# Patient Record
Sex: FEMALE | Race: WHITE | NOT HISPANIC OR LATINO | Employment: UNEMPLOYED | URBAN - NONMETROPOLITAN AREA
[De-identification: names, ages, dates, MRNs, and addresses within clinical notes are randomized per-mention and may not be internally consistent; named-entity substitution may affect disease eponyms.]

---

## 2017-02-27 ENCOUNTER — TRANSCRIBE ORDERS (OUTPATIENT)
Dept: MAMMOGRAPHY | Facility: HOSPITAL | Age: 57
End: 2017-02-27

## 2017-02-27 DIAGNOSIS — Z13.9 SCREENING: Primary | ICD-10-CM

## 2017-03-06 ENCOUNTER — HOSPITAL ENCOUNTER (OUTPATIENT)
Dept: MAMMOGRAPHY | Facility: HOSPITAL | Age: 57
Discharge: HOME | End: 2017-03-06
Attending: OBSTETRICS & GYNECOLOGY | Admitting: OBSTETRICS & GYNECOLOGY

## 2017-03-06 DIAGNOSIS — Z13.9 SCREENING: ICD-10-CM

## 2017-03-06 PROCEDURE — G0202 SCR MAMMO BI INCL CAD: HCPCS

## 2017-03-06 PROCEDURE — 77063 BREAST TOMOSYNTHESIS BI: CPT

## 2017-05-08 ENCOUNTER — TRANSCRIBE ORDERS (OUTPATIENT)
Dept: ULTRASOUND IMAGING | Facility: HOSPITAL | Age: 57
End: 2017-05-08

## 2017-05-08 ENCOUNTER — HOSPITAL ENCOUNTER (OUTPATIENT)
Dept: ULTRASOUND IMAGING | Facility: HOSPITAL | Age: 57
Discharge: HOME | End: 2017-05-08
Admitting: NURSE PRACTITIONER

## 2017-05-08 DIAGNOSIS — R60.9 SWELLING: ICD-10-CM

## 2017-05-08 DIAGNOSIS — R60.9 SWELLING: Primary | ICD-10-CM

## 2017-05-08 PROCEDURE — 76536 US EXAM OF HEAD AND NECK: CPT

## 2017-07-05 RX ORDER — LEVOTHYROXINE SODIUM 100 MCG
100 TABLET ORAL DAILY
Qty: 30 TABLET | Refills: 0 | Status: SHIPPED | OUTPATIENT
Start: 2017-07-05 | End: 2017-07-21 | Stop reason: SDUPTHER

## 2017-07-21 ENCOUNTER — TELEPHONE (OUTPATIENT)
Dept: ENDOCRINOLOGY | Facility: CLINIC | Age: 57
End: 2017-07-21

## 2017-07-21 ENCOUNTER — OFFICE VISIT (OUTPATIENT)
Dept: ENDOCRINOLOGY | Facility: CLINIC | Age: 57
End: 2017-07-21

## 2017-07-21 VITALS
HEART RATE: 62 BPM | WEIGHT: 206 LBS | HEIGHT: 65 IN | DIASTOLIC BLOOD PRESSURE: 74 MMHG | OXYGEN SATURATION: 98 % | SYSTOLIC BLOOD PRESSURE: 124 MMHG | BODY MASS INDEX: 34.32 KG/M2

## 2017-07-21 DIAGNOSIS — R53.82 CHRONIC FATIGUE: ICD-10-CM

## 2017-07-21 DIAGNOSIS — R21 RASH: ICD-10-CM

## 2017-07-21 DIAGNOSIS — E03.9 ACQUIRED HYPOTHYROIDISM: Primary | ICD-10-CM

## 2017-07-21 DIAGNOSIS — E78.00 PURE HYPERCHOLESTEROLEMIA: ICD-10-CM

## 2017-07-21 LAB
25(OH)D3 SERPL-MCNC: 53.4 NG/ML
ALBUMIN SERPL-MCNC: 4.5 G/DL (ref 3.2–4.8)
ALBUMIN/GLOB SERPL: 1.4 G/DL (ref 1.5–2.5)
ALP SERPL-CCNC: 103 U/L (ref 25–100)
ALT SERPL W P-5'-P-CCNC: 33 U/L (ref 7–40)
ANION GAP SERPL CALCULATED.3IONS-SCNC: 7 MMOL/L (ref 3–11)
ARTICHOKE IGE QN: 88 MG/DL (ref 0–130)
AST SERPL-CCNC: 23 U/L (ref 0–33)
BASOPHILS # BLD AUTO: 0.03 10*3/MM3 (ref 0–0.2)
BASOPHILS NFR BLD AUTO: 0.5 % (ref 0–1)
BILIRUB SERPL-MCNC: 0.5 MG/DL (ref 0.3–1.2)
BUN BLD-MCNC: 23 MG/DL (ref 9–23)
BUN/CREAT SERPL: 28.8 (ref 7–25)
CALCIUM SPEC-SCNC: 9.8 MG/DL (ref 8.7–10.4)
CHLORIDE SERPL-SCNC: 104 MMOL/L (ref 99–109)
CHOLEST SERPL-MCNC: 138 MG/DL (ref 0–200)
CO2 SERPL-SCNC: 28 MMOL/L (ref 20–31)
CREAT BLD-MCNC: 0.8 MG/DL (ref 0.6–1.3)
DEPRECATED RDW RBC AUTO: 42.2 FL (ref 37–54)
EOSINOPHIL # BLD AUTO: 0.25 10*3/MM3 (ref 0–0.3)
EOSINOPHIL NFR BLD AUTO: 4.5 % (ref 0–3)
ERYTHROCYTE [DISTWIDTH] IN BLOOD BY AUTOMATED COUNT: 12.7 % (ref 11.3–14.5)
GFR SERPL CREATININE-BSD FRML MDRD: 74 ML/MIN/1.73
GLOBULIN UR ELPH-MCNC: 3.3 GM/DL
GLUCOSE BLD-MCNC: 104 MG/DL (ref 70–100)
HCT VFR BLD AUTO: 43.5 % (ref 34.5–44)
HDLC SERPL-MCNC: 42 MG/DL (ref 40–60)
HGB BLD-MCNC: 14.2 G/DL (ref 11.5–15.5)
IMM GRANULOCYTES # BLD: 0.01 10*3/MM3 (ref 0–0.03)
IMM GRANULOCYTES NFR BLD: 0.2 % (ref 0–0.6)
LYMPHOCYTES # BLD AUTO: 1.77 10*3/MM3 (ref 0.6–4.8)
LYMPHOCYTES NFR BLD AUTO: 31.7 % (ref 24–44)
MCH RBC QN AUTO: 29.8 PG (ref 27–31)
MCHC RBC AUTO-ENTMCNC: 32.6 G/DL (ref 32–36)
MCV RBC AUTO: 91.2 FL (ref 80–99)
MONOCYTES # BLD AUTO: 0.39 10*3/MM3 (ref 0–1)
MONOCYTES NFR BLD AUTO: 7 % (ref 0–12)
NEUTROPHILS # BLD AUTO: 3.13 10*3/MM3 (ref 1.5–8.3)
NEUTROPHILS NFR BLD AUTO: 56.1 % (ref 41–71)
PLATELET # BLD AUTO: 210 10*3/MM3 (ref 150–450)
PMV BLD AUTO: 11.6 FL (ref 6–12)
POTASSIUM BLD-SCNC: 4.4 MMOL/L (ref 3.5–5.5)
PROT SERPL-MCNC: 7.8 G/DL (ref 5.7–8.2)
RBC # BLD AUTO: 4.77 10*6/MM3 (ref 3.89–5.14)
SODIUM BLD-SCNC: 139 MMOL/L (ref 132–146)
T4 FREE SERPL-MCNC: 1.21 NG/DL (ref 0.89–1.76)
TRIGL SERPL-MCNC: 68 MG/DL (ref 0–150)
TSH SERPL DL<=0.05 MIU/L-ACNC: 2.13 MIU/ML (ref 0.35–5.35)
WBC NRBC COR # BLD: 5.58 10*3/MM3 (ref 3.5–10.8)

## 2017-07-21 PROCEDURE — 82306 VITAMIN D 25 HYDROXY: CPT | Performed by: INTERNAL MEDICINE

## 2017-07-21 PROCEDURE — 83525 ASSAY OF INSULIN: CPT | Performed by: INTERNAL MEDICINE

## 2017-07-21 PROCEDURE — 80061 LIPID PANEL: CPT | Performed by: INTERNAL MEDICINE

## 2017-07-21 PROCEDURE — 84443 ASSAY THYROID STIM HORMONE: CPT | Performed by: INTERNAL MEDICINE

## 2017-07-21 PROCEDURE — 85025 COMPLETE CBC W/AUTO DIFF WBC: CPT | Performed by: INTERNAL MEDICINE

## 2017-07-21 PROCEDURE — 80053 COMPREHEN METABOLIC PANEL: CPT | Performed by: INTERNAL MEDICINE

## 2017-07-21 PROCEDURE — 84439 ASSAY OF FREE THYROXINE: CPT | Performed by: INTERNAL MEDICINE

## 2017-07-21 PROCEDURE — 99214 OFFICE O/P EST MOD 30 MIN: CPT | Performed by: INTERNAL MEDICINE

## 2017-07-21 PROCEDURE — 84481 FREE ASSAY (FT-3): CPT | Performed by: INTERNAL MEDICINE

## 2017-07-21 RX ORDER — CHLORAL HYDRATE 500 MG
CAPSULE ORAL 2 TIMES DAILY WITH MEALS
COMMUNITY
End: 2018-08-31

## 2017-07-21 RX ORDER — LEVOTHYROXINE SODIUM 100 MCG
100 TABLET ORAL DAILY
Qty: 30 TABLET | Refills: 11 | Status: SHIPPED | OUTPATIENT
Start: 2017-07-21 | End: 2017-10-26

## 2017-07-21 RX ORDER — PHENTERMINE HYDROCHLORIDE 37.5 MG/1
TABLET ORAL
Refills: 0 | COMMUNITY
Start: 2017-06-20 | End: 2018-08-31

## 2017-07-21 NOTE — PROGRESS NOTES
Ruthann Morales 56 y.o.  CC:Follow-up (Yearly) and Hypothyroidism    [place]: Follow-up (Yearly) and Hypothyroidism    Doing well overall   Good summer so far   Going to [place] for vacation  Energy is good     Allergies   Allergen Reactions   • Sulfa Antibiotics Rash       Current Outpatient Prescriptions:   •  Multiple Vitamins-Minerals (MULTIVITAMIN ADULT PO), Take  by mouth., Disp: , Rfl:   •  Omega-3 Fatty Acids (FISH OIL) 1000 MG capsule capsule, Take  by mouth 2 (Two) Times a Day With Meals., Disp: , Rfl:   •  Cholecalciferol (VITAMIN D) 1000 UNITS tablet, Take 2 tablets by mouth Daily., Disp: , Rfl:   •  cyclobenzaprine (FLEXERIL) 10 MG tablet, Take 1 tablet by mouth 3 (Three) Times a Day As Needed for Muscle Spasms., Disp: 15 tablet, Rfl: 0  •  phentermine (ADIPEX-P) 37.5 MG tablet, , Disp: , Rfl: 0  •  SYNTHROID 100 MCG tablet, TAKE 1 TABLET BY MOUTH ONCE DAILY, Disp: 30 tablet, Rfl: 11  Patient Active Problem List    Diagnosis   • Chronic fatigue [R53.82]   • Abdominal pain [R10.9]   • Hypothyroidism [E03.9]     Overview Note:     Impression: 05/18/2015 - check tfts  Impression: 05/15/2014 - check tft;      • Irreducible incisional hernia [K43.0]     Review of Systems   Constitutional: Negative for activity change, appetite change, chills, diaphoresis, fatigue, fever and unexpected weight change.   HENT: Negative for congestion, dental problem, drooling, ear discharge, ear pain, facial swelling, hearing loss, mouth sores, nosebleeds, postnasal drip, rhinorrhea, sinus pressure, sneezing, sore throat, tinnitus, trouble swallowing and voice change.    Eyes: Negative for photophobia, pain, discharge, redness, itching and visual disturbance.   Respiratory: Negative for apnea, cough, choking, chest tightness, shortness of breath, wheezing and stridor.    Cardiovascular: Negative for chest pain, palpitations and leg swelling.   Gastrointestinal: Negative for abdominal distention, abdominal pain, anal bleeding, blood  "in stool, constipation, diarrhea, nausea, rectal pain and vomiting.   Endocrine: Negative for cold intolerance, heat intolerance, polydipsia, polyphagia and polyuria.   Genitourinary: Negative for decreased urine volume, difficulty urinating, dysuria, enuresis, flank pain, frequency, genital sores, hematuria and urgency.   Musculoskeletal: Negative for arthralgias, back pain, gait problem, joint swelling, myalgias, neck pain and neck stiffness.   Skin: Negative for color change, pallor, rash and wound.   Allergic/Immunologic: Negative for environmental allergies, food allergies and immunocompromised state.   Neurological: Negative for dizziness, tremors, seizures, syncope, facial asymmetry, speech difficulty, weakness, light-headedness, numbness and headaches.   Hematological: Negative for adenopathy. Does not bruise/bleed easily.   Psychiatric/Behavioral: Negative for agitation, behavioral problems, confusion, decreased concentration, dysphoric mood, hallucinations, self-injury, sleep disturbance and suicidal ideas. The patient is not nervous/anxious and is not hyperactive.      Social History     Social History   • Marital status:      Spouse name: N/A   • Number of children: N/A   • Years of education: N/A     Occupational History   • Not on file.     Social History Main Topics   • Smoking status: Never Smoker   • Smokeless tobacco: Never Used   • Alcohol use Yes      Comment: occasionally   • Drug use: No   • Sexual activity: Not on file      Comment:      Other Topics Concern   • Not on file     Social History Narrative     Family History   Problem Relation Age of Onset   • Cancer Mother    • Breast cancer Mother    • Cancer Father    • Stroke Other    • Diabetes Other    • Heart attack Other    • Breast cancer Maternal Aunt      /74  Pulse 62  Ht 65\" (165.1 cm)  Wt 206 lb (93.4 kg)  SpO2 98%  BMI 34.28 kg/m2  Physical Exam   Constitutional: She is oriented to person, place, and time. " She appears well-developed and well-nourished.   HENT:   Head: Normocephalic and atraumatic.   Nose: Nose normal.   Mouth/Throat: Oropharynx is clear and moist.   Eyes: Conjunctivae, EOM and lids are normal. Pupils are equal, round, and reactive to light.   Neck: Trachea normal and normal range of motion. Neck supple. Carotid bruit is not present. No tracheal deviation present. No thyroid mass and no thyromegaly present.   Cardiovascular: Normal rate, regular rhythm, normal heart sounds and intact distal pulses.  Exam reveals no gallop and no friction rub.    No murmur heard.  Pulmonary/Chest: Effort normal and breath sounds normal. No respiratory distress. She has no wheezes.   Musculoskeletal: Normal range of motion. She exhibits no edema or deformity.   Lymphadenopathy:     She has no cervical adenopathy.   Neurological: She is alert and oriented to person, place, and time. She has normal reflexes. She displays normal reflexes. No cranial nerve deficit.   Skin: Skin is warm and dry. No rash noted. No cyanosis or erythema. Nails show no clubbing.   Rash right arm    Psychiatric: She has a normal mood and affect. Her speech is normal and behavior is normal. Judgment and thought content normal. Cognition and memory are normal.   Nursing note and vitals reviewed.    Results for orders placed or performed in visit on 07/21/16   T4, free   Result Value Ref Range    Free T4 1.00 0.89 - 1.76 ng/dL   TSH   Result Value Ref Range    TSH 2.263 0.350 - 5.350 mIU/mL   Vitamin D 25 hydroxy   Result Value Ref Range    25 Hydroxy, Vitamin D 42.5 ng/ml   Vitamin B12   Result Value Ref Range    Vitamin B-12 322 211 - 911 pg/mL   CBC auto differential   Result Value Ref Range    WBC 6.10 3.50 - 10.80 10*3/mm3    RBC 4.55 3.89 - 5.14 10*6/mm3    Hemoglobin 14.3 11.5 - 15.5 g/dL    Hematocrit 42.4 34.5 - 44.0 %    MCV 93.2 80.0 - 99.0 fL    MCH 31.4 (H) 27.0 - 31.0 pg    MCHC 33.7 32.0 - 36.0 g/dL    RDW 12.9 11.3 - 14.5 %    RDW-SD  43.8 37.0 - 54.0 fl    MPV 12.1 (H) 6.0 - 12.0 fL    Platelets 219 150 - 450 10*3/mm3    Neutrophil % 59.7 41.0 - 71.0 %    Lymphocyte % 26.9 24.0 - 44.0 %    Monocyte % 6.7 0.0 - 12.0 %    Eosinophil % 5.7 (H) 0.0 - 3.0 %    Basophil % 0.8 0.0 - 1.0 %    Immature Grans % 0.2 0.0 - 0.6 %    Neutrophils, Absolute 3.64 1.50 - 8.30 10*3/mm3    Lymphocytes, Absolute 1.64 0.60 - 4.80 10*3/mm3    Monocytes, Absolute 0.41 0.00 - 1.00 10*3/mm3    Eosinophils, Absolute 0.35 (H) 0.10 - 0.30 10*3/mm3    Basophils, Absolute 0.05 0.00 - 0.20 10*3/mm3    Immature Grans, Absolute 0.01 0.00 - 0.03 10*3/mm3     Problem List Items Addressed This Visit        Cardiovascular and Mediastinum    Pure hypercholesterolemia     Check flp          Relevant Orders    CBC & Differential    Lipid Panel    Vitamin D 25 Hydroxy    Insulin, Total       Endocrine    Hypothyroidism - Primary     Check tfts          Relevant Medications    SYNTHROID 100 MCG tablet    Other Relevant Orders    Comprehensive Metabolic Panel    T3, Free    T4, Free    TSH       Musculoskeletal and Integument    Rash     Rash right arm   Refill tmc          Relevant Medications    triamcinolone (KENALOG) 0.1 % ointment       Other    Chronic fatigue     Check vitamin D and insulin levels          Relevant Medications    phentermine (ADIPEX-P) 37.5 MG tablet        Return in about 1 year (around 7/21/2018) for Recheck 30 min .        Sarah Conde MA  Signed Kia Morelos MD

## 2017-07-22 LAB
INSULIN SERPL-ACNC: 11 UIU/ML (ref 2.6–24.9)
T3FREE SERPL-MCNC: 3 PG/ML (ref 2–4.4)

## 2017-07-26 ENCOUNTER — TELEPHONE (OUTPATIENT)
Dept: ENDOCRINOLOGY | Facility: CLINIC | Age: 57
End: 2017-07-26

## 2017-07-26 RX ORDER — TRIAMCINOLONE ACETONIDE 1 MG/ML
LOTION TOPICAL 2 TIMES DAILY
Qty: 60 ML | Refills: 0 | Status: SHIPPED | OUTPATIENT
Start: 2017-07-26 | End: 2018-08-31

## 2017-07-26 NOTE — TELEPHONE ENCOUNTER
Pt called stating Triamcinolone was called in for her but it was the oil. She would rather have the cream because the oil gets all over her clothes.

## 2017-10-24 ENCOUNTER — TELEPHONE (OUTPATIENT)
Dept: INTERNAL MEDICINE | Facility: CLINIC | Age: 57
End: 2017-10-24

## 2017-10-24 DIAGNOSIS — R25.2 LEG CRAMPS: Primary | ICD-10-CM

## 2017-10-24 DIAGNOSIS — E03.9 ACQUIRED HYPOTHYROIDISM: ICD-10-CM

## 2017-10-24 NOTE — TELEPHONE ENCOUNTER
Patient was advised lab order has been created and she states she will go to [facility] in [place]

## 2017-10-24 NOTE — TELEPHONE ENCOUNTER
Order created.  Ok to fax to [place].  Thanks, [facility]      PT HAS BEEN HAVING LEG CRAMPS. THE EVENING IS THE WORST TIME AND WAKES UP WITH KRISTIN ROQUE.  WOULD LIKE TO KNOW IF YOU CAN PUT AN ORDER IN FOR LAB WORK. IF SO CAN SHE GET IT DONE IN [place]? IF NOT LET ME KNOW AND ILL MAKE HER AN APPOINTMENT.

## 2017-10-25 ENCOUNTER — APPOINTMENT (OUTPATIENT)
Dept: LAB | Facility: HOSPITAL | Age: 57
End: 2017-10-25

## 2017-10-25 LAB
ALBUMIN SERPL-MCNC: 4.4 G/DL (ref 3.5–5)
ALBUMIN/GLOB SERPL: 1.6 G/DL (ref 1–2)
ALP SERPL-CCNC: 85 U/L (ref 38–126)
ALT SERPL W P-5'-P-CCNC: 43 U/L (ref 13–69)
ANION GAP SERPL CALCULATED.3IONS-SCNC: 18.6 MMOL/L
AST SERPL-CCNC: 25 U/L (ref 15–46)
BILIRUB SERPL-MCNC: 0.4 MG/DL (ref 0.2–1.3)
BUN BLD-MCNC: 17 MG/DL (ref 7–20)
BUN/CREAT SERPL: 24.3 (ref 7.1–23.5)
CALCIUM SPEC-SCNC: 9.6 MG/DL (ref 8.4–10.2)
CHLORIDE SERPL-SCNC: 106 MMOL/L (ref 98–107)
CO2 SERPL-SCNC: 24 MMOL/L (ref 26–30)
CREAT BLD-MCNC: 0.7 MG/DL (ref 0.6–1.3)
GFR SERPL CREATININE-BSD FRML MDRD: 86 ML/MIN/1.73
GLOBULIN UR ELPH-MCNC: 2.7 GM/DL
GLUCOSE BLD-MCNC: 109 MG/DL (ref 74–98)
POTASSIUM BLD-SCNC: 4.6 MMOL/L (ref 3.5–5.1)
PROT SERPL-MCNC: 7.1 G/DL (ref 6.3–8.2)
SODIUM BLD-SCNC: 144 MMOL/L (ref 137–145)
T4 FREE SERPL-MCNC: 1.09 NG/DL (ref 0.78–2.19)
TSH SERPL DL<=0.05 MIU/L-ACNC: 5.38 MIU/ML (ref 0.47–4.68)

## 2017-10-25 PROCEDURE — 80053 COMPREHEN METABOLIC PANEL: CPT | Performed by: INTERNAL MEDICINE

## 2017-10-25 PROCEDURE — 84443 ASSAY THYROID STIM HORMONE: CPT | Performed by: INTERNAL MEDICINE

## 2017-10-25 PROCEDURE — 84439 ASSAY OF FREE THYROXINE: CPT | Performed by: INTERNAL MEDICINE

## 2017-10-26 ENCOUNTER — TELEPHONE (OUTPATIENT)
Dept: INTERNAL MEDICINE | Facility: CLINIC | Age: 57
End: 2017-10-26

## 2017-10-26 RX ORDER — LEVOTHYROXINE SODIUM 100 MCG
100 TABLET ORAL DAILY
Qty: 30 TABLET | Refills: 11 | Status: SHIPPED | OUTPATIENT
Start: 2017-10-26 | End: 2017-10-28

## 2017-10-26 NOTE — TELEPHONE ENCOUNTER
tsh was elevated - I would continue 0.1 mg daily but take extra pill today and once weekly (take 1 pill every day except take 2 pills every Thursday).  Repeat tfts in 8 weeks.   Also please let her know that results will be sent to her within a week of lab being done- she does not need to call the office.  Thanks, [facility]    Pls advise on lab results.

## 2017-10-27 NOTE — TELEPHONE ENCOUNTER
Called pt no answer left vm to return call.     10/30/17  Called and informed pt, pt voiced understanding.

## 2017-10-28 ENCOUNTER — TELEPHONE (OUTPATIENT)
Dept: INTERNAL MEDICINE | Facility: CLINIC | Age: 57
End: 2017-10-28

## 2017-10-28 RX ORDER — LEVOTHYROXINE SODIUM 125 MCG
125 TABLET ORAL DAILY
Qty: 90 TABLET | Refills: 1 | Status: SHIPPED | OUTPATIENT
Start: 2017-10-28 | End: 2017-12-24

## 2017-10-29 ENCOUNTER — TELEPHONE (OUTPATIENT)
Dept: INTERNAL MEDICINE | Facility: CLINIC | Age: 57
End: 2017-10-29

## 2017-12-19 ENCOUNTER — TELEPHONE (OUTPATIENT)
Dept: INTERNAL MEDICINE | Facility: CLINIC | Age: 57
End: 2017-12-19

## 2017-12-19 DIAGNOSIS — E03.9 ACQUIRED HYPOTHYROIDISM: Primary | ICD-10-CM

## 2017-12-19 NOTE — TELEPHONE ENCOUNTER
NEEDS TO GET HER LABS THIS WEEK PER LETTER DR VELASQUEZ SENT HER IN October. THERE ISN'T ANY IN THE SYSTEM. SHE WANTS TO GO TO [place]. CAN YOU CALL HER WHEN THEY ARE IN.

## 2017-12-20 ENCOUNTER — APPOINTMENT (OUTPATIENT)
Dept: LAB | Facility: HOSPITAL | Age: 57
End: 2017-12-20

## 2017-12-20 LAB
T4 FREE SERPL-MCNC: 1.36 NG/DL (ref 0.78–2.19)
TSH SERPL DL<=0.05 MIU/L-ACNC: 0.07 MIU/ML (ref 0.47–4.68)

## 2017-12-20 PROCEDURE — 84439 ASSAY OF FREE THYROXINE: CPT | Performed by: INTERNAL MEDICINE

## 2017-12-20 PROCEDURE — 84443 ASSAY THYROID STIM HORMONE: CPT | Performed by: INTERNAL MEDICINE

## 2017-12-24 ENCOUNTER — TELEPHONE (OUTPATIENT)
Dept: INTERNAL MEDICINE | Facility: CLINIC | Age: 57
End: 2017-12-24

## 2017-12-24 RX ORDER — LEVOTHYROXINE SODIUM 112 UG/1
112 TABLET ORAL DAILY
Qty: 30 TABLET | Refills: 11 | Status: SHIPPED | OUTPATIENT
Start: 2017-12-24 | End: 2018-02-27 | Stop reason: SDUPTHER

## 2018-02-22 ENCOUNTER — TELEPHONE (OUTPATIENT)
Dept: INTERNAL MEDICINE | Facility: CLINIC | Age: 58
End: 2018-02-22

## 2018-02-22 DIAGNOSIS — E03.9 ACQUIRED HYPOTHYROIDISM: Primary | ICD-10-CM

## 2018-02-22 NOTE — TELEPHONE ENCOUNTER
PATIENT NEEDS LAB ORDERS PLACED IN EPIC, SHE WILL HAVE BLOOD DRAW DONE AT [facility]. SHE STATES DR MCDONNELL WANTED HER TO REPEAT THYROID LABS IN 3 MONTHS AFTER LAST LABS.    CALL BACK [phone]

## 2018-02-26 ENCOUNTER — APPOINTMENT (OUTPATIENT)
Dept: LAB | Facility: HOSPITAL | Age: 58
End: 2018-02-26

## 2018-02-26 LAB
T4 FREE SERPL-MCNC: 1.04 NG/DL (ref 0.78–2.19)
TSH SERPL DL<=0.05 MIU/L-ACNC: 0.18 MIU/ML (ref 0.47–4.68)

## 2018-02-26 PROCEDURE — 84439 ASSAY OF FREE THYROXINE: CPT | Performed by: INTERNAL MEDICINE

## 2018-02-26 PROCEDURE — 84443 ASSAY THYROID STIM HORMONE: CPT | Performed by: INTERNAL MEDICINE

## 2018-02-27 ENCOUNTER — TELEPHONE (OUTPATIENT)
Dept: ENDOCRINOLOGY | Facility: CLINIC | Age: 58
End: 2018-02-27

## 2018-02-27 RX ORDER — LEVOTHYROXINE SODIUM 0.1 MG/1
100 TABLET ORAL DAILY
Qty: 30 TABLET | Refills: 11 | Status: SHIPPED | OUTPATIENT
Start: 2018-02-27 | End: 2019-01-30 | Stop reason: SDUPTHER

## 2018-03-07 ENCOUNTER — HOSPITAL ENCOUNTER (OUTPATIENT)
Dept: MAMMOGRAPHY | Facility: HOSPITAL | Age: 58
Discharge: HOME | End: 2018-03-07
Admitting: OBSTETRICS & GYNECOLOGY

## 2018-03-07 DIAGNOSIS — Z12.31 SCREENING MAMMOGRAM, ENCOUNTER FOR: ICD-10-CM

## 2018-03-07 PROCEDURE — 77063 BREAST TOMOSYNTHESIS BI: CPT

## 2018-03-07 PROCEDURE — 77067 SCR MAMMO BI INCL CAD: CPT

## 2018-04-21 ENCOUNTER — OFFICE VISIT (OUTPATIENT)
Dept: INTERNAL MEDICINE | Facility: CLINIC | Age: 58
End: 2018-04-21

## 2018-04-21 VITALS
WEIGHT: 220 LBS | SYSTOLIC BLOOD PRESSURE: 122 MMHG | HEIGHT: 65 IN | DIASTOLIC BLOOD PRESSURE: 84 MMHG | HEART RATE: 75 BPM | BODY MASS INDEX: 36.65 KG/M2 | OXYGEN SATURATION: 98 %

## 2018-04-21 DIAGNOSIS — M62.838 TRAPEZIUS MUSCLE SPASM: ICD-10-CM

## 2018-04-21 DIAGNOSIS — R25.2 LEG CRAMPS: Primary | ICD-10-CM

## 2018-04-21 PROCEDURE — 99213 OFFICE O/P EST LOW 20 MIN: CPT | Performed by: PHYSICIAN ASSISTANT

## 2018-04-21 RX ORDER — CYCLOBENZAPRINE HCL 10 MG
10 TABLET ORAL 3 TIMES DAILY PRN
Qty: 15 TABLET | Refills: 0 | Status: CANCELLED | OUTPATIENT
Start: 2018-04-21

## 2018-04-21 RX ORDER — TIZANIDINE HYDROCHLORIDE 4 MG/1
4 CAPSULE, GELATIN COATED ORAL 2 TIMES DAILY
Qty: 60 CAPSULE | Refills: 11 | Status: SHIPPED | OUTPATIENT
Start: 2018-04-21 | End: 2018-09-11

## 2018-04-21 RX ORDER — TIZANIDINE HYDROCHLORIDE 4 MG/1
4 CAPSULE, GELATIN COATED ORAL 2 TIMES DAILY
Qty: 60 CAPSULE | Refills: 11 | Status: SHIPPED | OUTPATIENT
Start: 2018-04-21 | End: 2018-04-21 | Stop reason: SDUPTHER

## 2018-04-21 NOTE — PROGRESS NOTES
Subjective   Ruthann Morales is a 57 y.o. female  Leg Pain (Leg cramps having hard time sleeping at night. Sx started 3-4 months ago, gradually getting worse. )      History of Present Illness  Patient is a 57-year-old white female who comes in complaining of leg cramps at night she states that cramps are getting worse.  She notices grams couple of months ago she states that neither feels like her legs are moving while she's lying still.  Denies injury or trauma patient states when she gets up and walks around at night cramps decrease      The following portions of the patient's history were reviewed and updated as appropriate: allergies, current medications, past social history and problem list    Review of Systems   Constitutional: Negative for fatigue and unexpected weight change.   Respiratory: Negative for cough, chest tightness and shortness of breath.    Cardiovascular: Negative for chest pain, palpitations and leg swelling.   Gastrointestinal: Negative for nausea.   Skin: Negative for color change and rash.   Neurological: Negative for dizziness, syncope, weakness and headaches.       Objective     Vitals:    04/21/18 0947   BP: 122/84   Pulse: 75   SpO2: 98%       Physical Exam   Constitutional: She appears well-developed and well-nourished.   Neck: No JVD present.   Cardiovascular: Normal rate, regular rhythm, normal heart sounds, intact distal pulses and normal pulses.    No murmur heard.  Pulmonary/Chest: Effort normal and breath sounds normal. No respiratory distress.   Abdominal: Soft. Bowel sounds are normal. There is no hepatosplenomegaly. There is no tenderness.   Musculoskeletal: She exhibits no edema.        Right lower leg: She exhibits no tenderness and no swelling.        Left lower leg: She exhibits no tenderness and no swelling.   Skin: Skin is warm and dry.   Nursing note and vitals reviewed.      Assessment/Plan     Diagnoses and all orders for this visit:    Leg cramps  -      TiZANidine (ZANAFLEX) 4 MG capsule; Take 1 capsule by mouth 2 (Two) Times a Day.    Other orders  -     Cancel: cyclobenzaprine (FLEXERIL) 10 MG tablet; Take 1 tablet by mouth 3 (Three) Times a Day As Needed for Muscle Spasms.    follow up if no better

## 2018-05-03 ENCOUNTER — TELEPHONE (OUTPATIENT)
Dept: INTERNAL MEDICINE | Facility: CLINIC | Age: 58
End: 2018-05-03

## 2018-05-03 DIAGNOSIS — E03.9 ACQUIRED HYPOTHYROIDISM: Primary | ICD-10-CM

## 2018-05-10 ENCOUNTER — APPOINTMENT (OUTPATIENT)
Dept: LAB | Facility: HOSPITAL | Age: 58
End: 2018-05-10

## 2018-05-10 LAB
T4 FREE SERPL-MCNC: 1.22 NG/DL (ref 0.78–2.19)
TSH SERPL DL<=0.05 MIU/L-ACNC: 0.75 MIU/ML (ref 0.47–4.68)

## 2018-05-10 PROCEDURE — 84439 ASSAY OF FREE THYROXINE: CPT | Performed by: INTERNAL MEDICINE

## 2018-05-10 PROCEDURE — 84443 ASSAY THYROID STIM HORMONE: CPT | Performed by: INTERNAL MEDICINE

## 2018-08-27 ENCOUNTER — OFFICE VISIT (OUTPATIENT)
Dept: FAMILY MEDICINE CLINIC | Facility: CLINIC | Age: 58
End: 2018-08-27

## 2018-08-27 ENCOUNTER — HOSPITAL ENCOUNTER (OUTPATIENT)
Dept: ULTRASOUND IMAGING | Facility: HOSPITAL | Age: 58
Discharge: HOME | End: 2018-08-27
Attending: FAMILY MEDICINE | Admitting: FAMILY MEDICINE

## 2018-08-27 VITALS
HEART RATE: 90 BPM | TEMPERATURE: 99 F | HEIGHT: 65 IN | SYSTOLIC BLOOD PRESSURE: 134 MMHG | WEIGHT: 227.6 LBS | DIASTOLIC BLOOD PRESSURE: 80 MMHG | BODY MASS INDEX: 37.92 KG/M2 | OXYGEN SATURATION: 97 %

## 2018-08-27 DIAGNOSIS — R60.0 LEG EDEMA, RIGHT: ICD-10-CM

## 2018-08-27 DIAGNOSIS — M79.661 PAIN IN RIGHT LOWER LEG: Primary | ICD-10-CM

## 2018-08-27 DIAGNOSIS — S86.111A GASTROCNEMIUS TEAR, RIGHT, INITIAL ENCOUNTER: ICD-10-CM

## 2018-08-27 PROCEDURE — 93971 EXTREMITY STUDY: CPT

## 2018-08-27 PROCEDURE — 99203 OFFICE O/P NEW LOW 30 MIN: CPT | Performed by: FAMILY MEDICINE

## 2018-08-27 NOTE — PROGRESS NOTES
Established Patient        Chief Complaint:   Chief Complaint   Patient presents with   • Leg Pain     Right leg pain today, patient states she is also here to establish care.        Ruthann Morales is a 57 y.o. female    History of Present Illness:     Patient is a 57-year-old female who presents for evaluation of right posterior calf pain.  She states it is been progressively problematic, paroxysmal and paradoxical in nature, without association to any specific activity.  She does state that when it is problematic it last for an extended period of time, relieved somewhat with moist heat, particularly soaking in hot water.  He does limit her function at times, initially reported as being severe in nature.  Only paradoxically severe at this time.  She has no focal trauma or remembrance of injury.  She states she feels as though it is swollen at times additionally.  She denies any palpitations, chest pain or shortness of breath.  No complaints of vertigo or dyspnea on exertion.    Will also like to establish care with a new primary care provider.    Subjective     The following portions of the patient's history were reviewed and updated as appropriate: allergies, current medications, past family history, past medical history, past social history, past surgical history and problem list.    Allergies   Allergen Reactions   • Sulfa Antibiotics Rash       Review of Systems  1. Constitutional: Negative for fever. Negative for chills, diaphoresis, fatigue and unexpected weight change.   2. HENT: No dysphagia; no changes to vision/hearing/smell/taste; no epistaxis  3. Eyes: Negative for redness and visual disturbance.   4. Respiratory: negative for shortness of breath. Negative for chest pain . Negative for cough and chest tightness.   5. Cardiovascular: Negative for chest pain and palpitations.   6. Gastrointestinal: Negative for abdominal distention, abdominal pain and blood in stool.   7. Endocrine: Negative for  "cold intolerance and heat intolerance.   8. Genitourinary: Negative for difficulty urinating, dysuria and frequency.   9. Musculoskeletal: Pain to the posterior right calf, without knowledge of trauma.   10. Skin: Negative for color change, rash and wound.   11. Neurological: Negative for syncope, weakness and headaches.   12. Hematological: Negative for adenopathy. Does not bruise/bleed easily.   13. Psychiatric/Behavioral: Negative for confusion. The patient is not nervous/anxious.    Objective     Physical Exam   Vital Signs: /80   Pulse 90   Temp 99 °F (37.2 °C) (Temporal Artery )   Ht 165.1 cm (65\")   Wt 103 kg (227 lb 9.6 oz)   SpO2 97%   BMI 37.87 kg/m²     General Appearance: alert, oriented x 3, no acute distress.  Skin: warm and dry.   HEENT: Atraumatic.  pupils round and reactive to light and accommodation, oral mucosa pink and moist.  Nares patent without epistaxis.  External auditory canals are patent tympanic membranes intact.  Neck: supple, no JVD, trachea midline.  No thyromegaly  Lungs: CTA, unlabored breathing effort.  Heart: RRR, normal S1 and S2, no S3, no rub.  Abdomen: soft, non-tender, no palpable bladder, present bowel sounds to auscultation ×4.  No guarding or rigidity.  Extremities: no clubbing, cyanosis.  Good range of motion actively and passively.  Symmetric muscle strength and development .  Mild nonpitting edema to right lower extremity.  [place] test negative.  Homans nonspecific.  No ligamentous laxity.  Telma's negative.  Dorsiflexion and plantar flexion bilaterally normal to the feet.  Dorsalis pedis and posterior tibialis pulses 2+.  Age appropriate and expected varicosities to bilateral lower extremities.  No superficial skin changes.  Patient is able to toe raise and heel rock appropriately.  Neuro: normal speech and mental status.  Cranial nerves II through XII intact.  No anosmia. DTR 2+; proprioception intact.  No focal motor/sensory deficits.    Assessment " and Plan      Assessment:   Ruthann was seen today for leg pain.    Diagnoses and all orders for this visit:    Pain in right lower leg  -     Duplex Venous Lower Extremity - Right CAR  -     Walking Boot- Right  -     diclofenac (VOLTAREN) 50 MG EC tablet; Take 1 tablet by mouth 2 (Two) Times a Day With Meals.  -     Ambulatory Referral to Physical Therapy Evaluate and treat; Cross Fiber, Soft Tissue Mobilizaton; Stretching, ROM, Strengthening; Right; Full weight bearing    Leg edema, right  -     Duplex Venous Lower Extremity - Right CAR  -     Ambulatory Referral to Physical Therapy Evaluate and treat; Cross Fiber, Soft Tissue Mobilizaton; Stretching, ROM, Strengthening; Right; Full weight bearing    Gastrocnemius tear, right, initial encounter  -     Walking Boot- Right  -     diclofenac (VOLTAREN) 50 MG EC tablet; Take 1 tablet by mouth 2 (Two) Times a Day With Meals.  -     Ambulatory Referral to Physical Therapy Evaluate and treat; Cross Fiber, Soft Tissue Mobilizaton; Stretching, ROM, Strengthening; Right; Full weight bearing        Plan:  I've discussed the probable diagnosis of gastrocnemius tear in the remote past.  I recommended that she undergo venous ultrasound to rule out DVT due to nontraumatic nature.  If negative, patient will be placed in a high tide pneumatic walking boot.  Planned duration of therapy initially will be 2 weeks with any weightbearing activity.  I've also prescribed diclofenac, 50 mg twice daily dosing with food, for an additional 2 week duration.  I have recommended that patient consider formal physical therapy, which she has agreed to.  Referral has been placed.  Discussion Summary:    Discussed plan of care in detail with pt today; pt verb understanding and agrees; counseled for approx 20 min of total 30 min exam time.  Follow up:  Return in about 2 weeks (around 9/10/2018) for Recheck RLE, Next scheduled follow up.     Patient Instructions   Medial Head Gastrocnemius Tear  Medial  head gastrocnemius tear, also called tennis leg, is an injury to the inner part of the calf muscle. This injury may include overstretching of the muscle or a partial or complete tear. This is a common sports injury. Calf muscle tears usually occur near the back of the knee. This often causes sudden pain and muscle weakness.  What are the causes?  This condition is caused by forceful stretching or strain on the calf muscle. This usually happens when you forcefully push off of your foot. It may also happen if you forcefully straighten your knee while your foot is flat on the ground.  What increases the risk?  The following factors may make you more likely to develop this condition:  · Being male and older than age 40.  · Playing sports that involve:  ? Quick increases in speed and changes of direction, such as tennis and soccer.  ? Jumping, such as basketball.  ? Running, especially uphill or on uneven ground.    What are the signs or symptoms?  Symptoms of this condition include:  · Sudden pain in the back of the leg when the injury occurs. You may hear a popping sound or feel like you got hit in your calf.  · Pain that is made worse by bringing your toes up toward your shin or by straightening your knee.  · Pain on the inside of your calf, from your knee to your ankle.  · Not being able to rise up on your toes.  · Pain when pressing on your calf muscle.  · Swelling of your calf.  · Bruising along your calf and lower leg, down to your ankle.  · Difficulty pushing off your foot when walking or using stairs.    How is this diagnosed?  This condition may be diagnosed based on:  · Your symptoms and medical history.  · A physical exam. Your health care provider may be able to feel a lump or a defect in your muscle.  · MRI or ultrasound to determine the severity and exact location of your injury.    How is this treated?  Treatment for this condition may include:  · Resting the muscle and keeping weight off your leg for  several days. During this time, you may use crutches or another walking device.  · Using a splint to keep your ankle or knee in a stable position.  · Wearing a walking boot to decrease the use of your gastrocnemius muscle.  · Using a wedge under your heel to reduce stretching of your healing muscle.  · Wearing a compression sleeve around your calf muscle.  · Icing the muscle.  · Raising (elevating) your leg when resting.  · Taking medicine for pain and swelling (NSAIDs or steroids).  · Doing leg exercises as told by your health care provider or physical therapist.    Follow these instructions at home:  If you have a splint or compression sleeve:  · Wear it as told by your health care provider. Remove it only as told by your health care provider.  · Loosen the splint if your toes tingle, become numb, or turn cold and blue.  · If your splint or sleeve is not waterproof:  ? Do not let it get wet.  ? Protect it with a watertight covering when you take a bath or a shower.  · Keep the splint or sleeve clean.  Managing pain, stiffness, and swelling  · If directed, put ice on the injured area:  ? Put ice in a plastic bag.  ? Place a towel between your skin and the bag.  ? Leave the ice on for 20 minutes, 2-3 times a day.  · Move your toes often to avoid stiffness and to lessen swelling.  · Raise (elevate) the injured area above the level of your heart while you are sitting or lying down.  Driving  · Do not drive or operate heavy machinery while taking prescription pain medicine.  · Ask your health care provider when it is safe to drive.  Activity  · Do not use the injured limb to support your body weight until your health care provider says that you can. Use crutches as told by your health care provider.  · Return gradually to your normal activities as told by your health care provider. Ask your health care provider what activities are safe for you.  · Do exercises as told by your health care provider.  · Return to sporting  activity only as told by your health care provider or physical therapist. Full recovery may take several months.  General instructions  · Take over-the-counter and prescription medicines only as told by your health care provider.  · Keep all follow-up visits as told by your health care provider. This is important.  How is this prevented?  · Warm up and stretch before being active.  · Cool down and stretch after being active.  · Give your body time to rest between periods of activity.  · Make sure to use equipment that fits you.  · Be safe and responsible while being active to avoid falls.  · Maintain physical fitness, including:  ? Strength.  ? Flexibility.  Contact a health care provider if:  · Your symptoms do not improve with rest and treatment.  Get help right away if:  · You have swelling or redness in your calf that is getting worse.  This information is not intended to replace advice given to you by your health care provider. Make sure you discuss any questions you have with your health care provider.  Document Released: 12/18/2006 Document Revised: 08/22/2017 Document Reviewed: 11/29/2016  [employer] Patient Education © 2018 [employer]        Mars Desai DO  08/27/18  3:35 PM    Please note that portions of this note may have been completed with a voice recognition program. Efforts were made to edit the dictations, but occasionally words are mistranscribed.

## 2018-08-27 NOTE — PATIENT INSTRUCTIONS
Medial Head Gastrocnemius Tear  Medial head gastrocnemius tear, also called tennis leg, is an injury to the inner part of the calf muscle. This injury may include overstretching of the muscle or a partial or complete tear. This is a common sports injury. Calf muscle tears usually occur near the back of the knee. This often causes sudden pain and muscle weakness.  What are the causes?  This condition is caused by forceful stretching or strain on the calf muscle. This usually happens when you forcefully push off of your foot. It may also happen if you forcefully straighten your knee while your foot is flat on the ground.  What increases the risk?  The following factors may make you more likely to develop this condition:  · Being male and older than age 40.  · Playing sports that involve:  ? Quick increases in speed and changes of direction, such as tennis and soccer.  ? Jumping, such as basketball.  ? Running, especially uphill or on uneven ground.    What are the signs or symptoms?  Symptoms of this condition include:  · Sudden pain in the back of the leg when the injury occurs. You may hear a popping sound or feel like you got hit in your calf.  · Pain that is made worse by bringing your toes up toward your shin or by straightening your knee.  · Pain on the inside of your calf, from your knee to your ankle.  · Not being able to rise up on your toes.  · Pain when pressing on your calf muscle.  · Swelling of your calf.  · Bruising along your calf and lower leg, down to your ankle.  · Difficulty pushing off your foot when walking or using stairs.    How is this diagnosed?  This condition may be diagnosed based on:  · Your symptoms and medical history.  · A physical exam. Your health care provider may be able to feel a lump or a defect in your muscle.  · MRI or ultrasound to determine the severity and exact location of your injury.    How is this treated?  Treatment for this condition may include:  · Resting the muscle  and keeping weight off your leg for several days. During this time, you may use crutches or another walking device.  · Using a splint to keep your ankle or knee in a stable position.  · Wearing a walking boot to decrease the use of your gastrocnemius muscle.  · Using a wedge under your heel to reduce stretching of your healing muscle.  · Wearing a compression sleeve around your calf muscle.  · Icing the muscle.  · Raising (elevating) your leg when resting.  · Taking medicine for pain and swelling (NSAIDs or steroids).  · Doing leg exercises as told by your health care provider or physical therapist.    Follow these instructions at home:  If you have a splint or compression sleeve:  · Wear it as told by your health care provider. Remove it only as told by your health care provider.  · Loosen the splint if your toes tingle, become numb, or turn cold and blue.  · If your splint or sleeve is not waterproof:  ? Do not let it get wet.  ? Protect it with a watertight covering when you take a bath or a shower.  · Keep the splint or sleeve clean.  Managing pain, stiffness, and swelling  · If directed, put ice on the injured area:  ? Put ice in a plastic bag.  ? Place a towel between your skin and the bag.  ? Leave the ice on for 20 minutes, 2-3 times a day.  · Move your toes often to avoid stiffness and to lessen swelling.  · Raise (elevate) the injured area above the level of your heart while you are sitting or lying down.  Driving  · Do not drive or operate heavy machinery while taking prescription pain medicine.  · Ask your health care provider when it is safe to drive.  Activity  · Do not use the injured limb to support your body weight until your health care provider says that you can. Use crutches as told by your health care provider.  · Return gradually to your normal activities as told by your health care provider. Ask your health care provider what activities are safe for you.  · Do exercises as told by your health  care provider.  · Return to sporting activity only as told by your health care provider or physical therapist. Full recovery may take several months.  General instructions  · Take over-the-counter and prescription medicines only as told by your health care provider.  · Keep all follow-up visits as told by your health care provider. This is important.  How is this prevented?  · Warm up and stretch before being active.  · Cool down and stretch after being active.  · Give your body time to rest between periods of activity.  · Make sure to use equipment that fits you.  · Be safe and responsible while being active to avoid falls.  · Maintain physical fitness, including:  ? Strength.  ? Flexibility.  Contact a health care provider if:  · Your symptoms do not improve with rest and treatment.  Get help right away if:  · You have swelling or redness in your calf that is getting worse.  This information is not intended to replace advice given to you by your health care provider. Make sure you discuss any questions you have with your health care provider.  Document Released: 12/18/2006 Document Revised: 08/22/2017 Document Reviewed: 11/29/2016  [employer] Interactive Patient Education © 2018 [employer]

## 2018-08-30 ENCOUNTER — TREATMENT (OUTPATIENT)
Dept: PHYSICAL THERAPY | Facility: CLINIC | Age: 58
End: 2018-08-30

## 2018-08-30 DIAGNOSIS — S86.111A STRAIN OF RIGHT GASTROCNEMIUS MUSCLE, INITIAL ENCOUNTER: ICD-10-CM

## 2018-08-30 DIAGNOSIS — M79.661 RIGHT CALF PAIN: Primary | ICD-10-CM

## 2018-08-30 PROCEDURE — 97110 THERAPEUTIC EXERCISES: CPT | Performed by: PHYSICAL THERAPIST

## 2018-08-30 PROCEDURE — 97161 PT EVAL LOW COMPLEX 20 MIN: CPT | Performed by: PHYSICAL THERAPIST

## 2018-08-30 NOTE — PROGRESS NOTES
Physical Therapy Initial Evaluation and Plan of Care      Patient: Ruthann Morales   : 1960  Diagnosis/ICD-10 Code:  Right calf pain [M79.661]  Referring practitioner: Mars Desai DO    Subjective Evaluation    History of Present Illness  Mechanism of injury: Patient states she does not know when it started but her right calf started getting painful in the past 3-4 weeks when walking for prolonged periods. States the pain has been on and off for about 3 months. States she hasn't started anything new exercise programs or jobs. Has been moving boxes and readying a house for past 4 months. Currently wearing a hard boot which has helped.     States she can be on her feet for about 20-30 mins before it starts really hurting.     PMH: right menisectomy (states she has not be able to bend it all the way)      Patient Occupation: Retired  Pain  Current pain ratin  At best pain ratin  At worst pain ratin  Location: Right upper calf   Quality: pulling, cramping and tight  Relieving factors: ice, heat and medications  Aggravating factors: ambulation, standing, prolonged positioning, stairs and squatting    Diagnostic Tests  No diagnostic tests performed    Treatments  Previous treatment: physical therapy  Patient Goals  Patient goals for therapy: increased motion, improved balance, decreased pain, decreased edema, increased strength, independence with ADLs/IADLs and return to sport/leisure activities  Patient goal: Walking program, exercise            Objective       Palpation     Right Tenderness of the medial gastrocnemius.     Right Ankle/Foot Comments  Right medial gastrocnemius: Edema.     Neurological Testing     Sensation     Knee     Right Knee   Intact: light touch     Active Range of Motion   Left Knee   Flexion: 115 degrees   Extension: 0 degrees     Right Knee   Flexion: 90 degrees   Extensor la degrees     Right Ankle/Foot   Dorsiflexion (ke): 10 degrees   Plantar flexion: 25  degrees   Inversion: 26 degrees   Eversion: 36 degrees     Passive Range of Motion     Right Knee   Flexion: 95 degrees   Extension: 0 degrees with pain    Strength/Myotome Testing     Left Knee   Flexion: 4  Extension: 4-    Right Knee   Flexion: 4  Extension: 4    Right Ankle/Foot   Normal strength         Assessment & Plan     Assessment  Impairments: abnormal coordination, abnormal gait, abnormal muscle firing, abnormal muscle tone, abnormal or restricted ROM, activity intolerance, impaired balance, impaired physical strength, lacks appropriate home exercise program and pain with function  Assessment details: Patient is a 57 year old female presenting with idiopathic medial gastroc pain with walking. Patient presents with decreased knee mobility, decreased knee and hip strength, antalgic gait, edema over proximal medial gastroc muscle belly and tendon, and decreased ability to ambulate, take stairs, and squat to reach an item.   Prognosis: good  Prognosis details: Short Term Goals (2 weeks):  1. Patient will be independent with home exercise program.  2. Patient will demonstrate improved knee strength by 50%.  3. Patient will demonstrate improved knee mobility by 50%.     Long Term Goals (6 weeks):  1. Patient will be able to walk at least 30 minutes with calf pain no greater than 2/10.  2. Patient will demonstrate functional squat with calf pain no greater than 2/10.  3. Patient will be able to return to full work and/or home duty with back pain no greater than 2/10.   Functional Limitations: walking, uncomfortable because of pain and standing  Plan  Therapy options: will be seen for skilled physical therapy services  Planned modality interventions: cryotherapy, high voltage pulsed current (pain management), high voltage pulsed current (spasm management), ultrasound, thermotherapy (hydrocollator packs), TENS and iontophoresis  Planned therapy interventions: ADL retraining, balance/weight-bearing training, body  mechanics training, fine motor coordination training, flexibility, functional ROM exercises, gait training, home exercise program, IADL retraining, joint mobilization, therapeutic activities, stretching, strengthening, spinal/joint mobilization, soft tissue mobilization, postural training, neuromuscular re-education, motor coordination training and manual therapy  Frequency: 2-3x/week.  Duration in visits: 16  Treatment plan discussed with: patient        Manual Therapy:         mins  38360;  Therapeutic Exercise:    35     mins  39574;     Neuromuscular Broderick:        mins  45336;    Therapeutic Activity:          mins  07626;     Gait Training:           mins  55539;     Ultrasound:          mins  33783;    Electrical Stimulation:         mins  04684 ( );  Dry Needling          mins self-pay    Timed Treatment:   35   mins   Total Treatment:     54   mins    PT SIGNATURE: Socorro Washington, PAPO   DATE TREATMENT INITIATED: 8/30/2018    Initial Certification  Certification Period: 11/28/2018  I certify that the therapy services are furnished while this patient is under my care.  The services outlined above are required by this patient, and will be reviewed every 90 days.     PHYSICIAN: Mars Desai, DO      DATE:     Please sign and return via fax to [phone].. Thank you, [facility].

## 2018-08-31 ENCOUNTER — OFFICE VISIT (OUTPATIENT)
Dept: ENDOCRINOLOGY | Facility: CLINIC | Age: 58
End: 2018-08-31

## 2018-08-31 VITALS
WEIGHT: 227 LBS | DIASTOLIC BLOOD PRESSURE: 88 MMHG | HEART RATE: 86 BPM | SYSTOLIC BLOOD PRESSURE: 120 MMHG | HEIGHT: 65 IN | OXYGEN SATURATION: 98 % | BODY MASS INDEX: 37.82 KG/M2

## 2018-08-31 DIAGNOSIS — E03.9 ACQUIRED HYPOTHYROIDISM: ICD-10-CM

## 2018-08-31 DIAGNOSIS — M79.671 RIGHT FOOT PAIN: ICD-10-CM

## 2018-08-31 DIAGNOSIS — E78.00 PURE HYPERCHOLESTEROLEMIA: Primary | ICD-10-CM

## 2018-08-31 PROCEDURE — 99213 OFFICE O/P EST LOW 20 MIN: CPT | Performed by: INTERNAL MEDICINE

## 2018-09-04 ENCOUNTER — APPOINTMENT (OUTPATIENT)
Dept: LAB | Facility: HOSPITAL | Age: 58
End: 2018-09-04

## 2018-09-04 ENCOUNTER — TREATMENT (OUTPATIENT)
Dept: PHYSICAL THERAPY | Facility: CLINIC | Age: 58
End: 2018-09-04

## 2018-09-04 DIAGNOSIS — S86.111A STRAIN OF RIGHT GASTROCNEMIUS MUSCLE, INITIAL ENCOUNTER: ICD-10-CM

## 2018-09-04 DIAGNOSIS — M79.661 RIGHT CALF PAIN: Primary | ICD-10-CM

## 2018-09-04 LAB
25(OH)D3 SERPL-MCNC: 52.3 NG/ML
ALBUMIN SERPL-MCNC: 4.7 G/DL (ref 3.5–5)
ALBUMIN/GLOB SERPL: 1.6 G/DL (ref 1–2)
ALP SERPL-CCNC: 100 U/L (ref 38–126)
ALT SERPL W P-5'-P-CCNC: 60 U/L (ref 13–69)
ANION GAP SERPL CALCULATED.3IONS-SCNC: 17.4 MMOL/L (ref 10–20)
AST SERPL-CCNC: 43 U/L (ref 15–46)
BILIRUB SERPL-MCNC: 0.6 MG/DL (ref 0.2–1.3)
BUN BLD-MCNC: 13 MG/DL (ref 7–20)
BUN/CREAT SERPL: 18.6 (ref 7.1–23.5)
CALCIUM SPEC-SCNC: 9.6 MG/DL (ref 8.4–10.2)
CHLORIDE SERPL-SCNC: 107 MMOL/L (ref 98–107)
CHOLEST SERPL-MCNC: 152 MG/DL (ref 0–199)
CO2 SERPL-SCNC: 23 MMOL/L (ref 26–30)
CREAT BLD-MCNC: 0.7 MG/DL (ref 0.6–1.3)
GFR SERPL CREATININE-BSD FRML MDRD: 86 ML/MIN/1.73
GLOBULIN UR ELPH-MCNC: 3 GM/DL
GLUCOSE BLD-MCNC: 105 MG/DL (ref 74–98)
HDLC SERPL-MCNC: 44 MG/DL (ref 40–60)
LDLC SERPL CALC-MCNC: 81 MG/DL (ref 0–99)
LDLC/HDLC SERPL: 1.84 {RATIO}
POTASSIUM BLD-SCNC: 4.4 MMOL/L (ref 3.5–5.1)
PROT SERPL-MCNC: 7.7 G/DL (ref 6.3–8.2)
SODIUM BLD-SCNC: 143 MMOL/L (ref 137–145)
T4 FREE SERPL-MCNC: 0.91 NG/DL (ref 0.78–2.19)
TRIGL SERPL-MCNC: 135 MG/DL
TSH SERPL DL<=0.05 MIU/L-ACNC: 3.55 MIU/ML (ref 0.47–4.68)
VLDLC SERPL-MCNC: 27 MG/DL

## 2018-09-04 PROCEDURE — 86803 HEPATITIS C AB TEST: CPT | Performed by: INTERNAL MEDICINE

## 2018-09-04 PROCEDURE — 97140 MANUAL THERAPY 1/> REGIONS: CPT | Performed by: PHYSICAL THERAPIST

## 2018-09-04 PROCEDURE — 80053 COMPREHEN METABOLIC PANEL: CPT | Performed by: INTERNAL MEDICINE

## 2018-09-04 PROCEDURE — 82306 VITAMIN D 25 HYDROXY: CPT | Performed by: INTERNAL MEDICINE

## 2018-09-04 PROCEDURE — 97110 THERAPEUTIC EXERCISES: CPT | Performed by: PHYSICAL THERAPIST

## 2018-09-04 PROCEDURE — 80061 LIPID PANEL: CPT | Performed by: INTERNAL MEDICINE

## 2018-09-04 PROCEDURE — 84439 ASSAY OF FREE THYROXINE: CPT | Performed by: INTERNAL MEDICINE

## 2018-09-04 PROCEDURE — 84443 ASSAY THYROID STIM HORMONE: CPT | Performed by: INTERNAL MEDICINE

## 2018-09-04 NOTE — PROGRESS NOTES
Subjective   Ruthann Morales reports: 2-3/10 pain at rest. States the sharp pain comes and goes. Still wearing boot. Exercises have felt good, no soreness.     Objective     Right knee AAROM: 90 deg     Pat able to go backwards initiialy on bike with raised seat with difficulty due to lack of knee ROM but was able to go forwards after 3 mins.     Gait without boot: premature right knee flexion during right mid stance.     See Exercise, Manual, and Modality Logs for complete treatment.       Assessment/Plan  Patient responded well to initial exercises and stretches. Trial with ice massage today.   Progress per Plan of Care           Manual Therapy:    8     mins  56846;  Therapeutic Exercise:    44     mins  43811;     Neuromuscular Broderick:        mins  14149;    Therapeutic Activity:          mins  15843;     Gait Training:           mins  72411;     Ultrasound:          mins  24557;   Iontophoresis          mins  29452   Electrical Stimulation:         mins  35833 ( );  Dry Needling          mins self-pay  Fluidotherapy          mins 02834    Timed Treatment:   52   mins   Total Treatment:     52   mins    Socorro Washington, PT  Physical Therapist

## 2018-09-05 LAB — HCV AB S/CO SERPL IA: <0.1 S/CO RATIO (ref 0–0.9)

## 2018-09-06 ENCOUNTER — TREATMENT (OUTPATIENT)
Dept: PHYSICAL THERAPY | Facility: CLINIC | Age: 58
End: 2018-09-06

## 2018-09-06 DIAGNOSIS — S86.111A STRAIN OF RIGHT GASTROCNEMIUS MUSCLE, INITIAL ENCOUNTER: ICD-10-CM

## 2018-09-06 DIAGNOSIS — M79.661 RIGHT CALF PAIN: Primary | ICD-10-CM

## 2018-09-06 PROCEDURE — 97110 THERAPEUTIC EXERCISES: CPT | Performed by: PHYSICAL THERAPIST

## 2018-09-06 NOTE — PROGRESS NOTES
Subjective   Ruthann Morales reports: 5/10 pain at rest. States she had a lot of soreness after last session and the pain is on the other side of the back of the calf.     Objective   Palpation: tenderness in lateral head of gastroc muscle   See Exercise, Manual, and Modality Logs for complete treatment.       Assessment/Plan  Patient reported decreased pain at end of session. Discussed doing light stretches and exercises when sore.   Progress per Plan of Care           Manual Therapy:         mins  36860;  Therapeutic Exercise:    53     mins  07214;     Neuromuscular Broderick:        mins  50967;    Therapeutic Activity:          mins  27947;     Gait Training:           mins  93068;     Ultrasound:          mins  25317;   Iontophoresis          mins  87642   Electrical Stimulation:         mins  09870 ( );  Dry Needling          mins self-pay  Fluidotherapy          mins 28040    Timed Treatment:   53   mins   Total Treatment:     63   mins    Socorro Washington, PT  Physical Therapist

## 2018-09-10 ENCOUNTER — TREATMENT (OUTPATIENT)
Dept: PHYSICAL THERAPY | Facility: CLINIC | Age: 58
End: 2018-09-10

## 2018-09-10 DIAGNOSIS — S86.111A STRAIN OF RIGHT GASTROCNEMIUS MUSCLE, INITIAL ENCOUNTER: ICD-10-CM

## 2018-09-10 DIAGNOSIS — M79.661 RIGHT CALF PAIN: Primary | ICD-10-CM

## 2018-09-10 PROCEDURE — 97110 THERAPEUTIC EXERCISES: CPT | Performed by: PHYSICAL THERAPIST

## 2018-09-10 PROCEDURE — 97530 THERAPEUTIC ACTIVITIES: CPT | Performed by: PHYSICAL THERAPIST

## 2018-09-10 NOTE — PROGRESS NOTES
Subjective   Ruthann Morales reports: 5-6/10 pain in middle upper calf at rest. States last night her calf hurt a lot. States she thinks she's been doing too much, most days the pain is less.     Objective   See Exercise, Manual, and Modality Logs for complete treatment.       Assessment/Plan  Patient had less pain during step ups and no pain with steps down on 6 inch step today. Still having increased tenderness.   Progress per Plan of Care           Manual Therapy:         mins  86425;  Therapeutic Exercise:    48     mins  89865;     Neuromuscular Broderick:        mins  10892;    Therapeutic Activity:     8     mins  36872;     Gait Training:           mins  66297;     Ultrasound:          mins  15572;   Iontophoresis          mins  89437   Electrical Stimulation:         mins  75517 ( );  Dry Needling          mins self-pay  Fluidotherapy          mins 48680    Timed Treatment:   56   mins   Total Treatment:     56   mins    Socorro Washington, PT  Physical Therapist

## 2018-09-11 ENCOUNTER — OFFICE VISIT (OUTPATIENT)
Dept: FAMILY MEDICINE CLINIC | Facility: CLINIC | Age: 58
End: 2018-09-11

## 2018-09-11 VITALS
HEIGHT: 65 IN | RESPIRATION RATE: 12 BRPM | HEART RATE: 64 BPM | SYSTOLIC BLOOD PRESSURE: 122 MMHG | OXYGEN SATURATION: 97 % | TEMPERATURE: 97.6 F | DIASTOLIC BLOOD PRESSURE: 82 MMHG

## 2018-09-11 DIAGNOSIS — S86.111D GASTROCNEMIUS TEAR, RIGHT, SUBSEQUENT ENCOUNTER: Primary | ICD-10-CM

## 2018-09-11 PROCEDURE — 99213 OFFICE O/P EST LOW 20 MIN: CPT | Performed by: FAMILY MEDICINE

## 2018-09-11 NOTE — PROGRESS NOTES
Established Patient        Chief Complaint:   Chief Complaint   Patient presents with   • Follow-up     2 week follow up on leg pain        Ruthann Morales is a 57 y.o. female    History of Present Illness:     Patient is a 57-year-old female who is here for scheduled follow-up appointment.  She is continue to utilize high tide pneumatic walking boot with significant improvement to the discomfort to the right posterior calf.  She is also tolerating physical therapy with significant improvement with each session.  Patient describes most improvement comes from ice massage.  Patient is also noted significant improvement to the strength of her right knee and feeling of stability.  This is improved range of motion to her knee additionally.  She denies any new onset trauma or uncontrolled pain.  Ultrasound of right lower extremity demonstrated no signs of deep venous thrombosis.    Subjective     The following portions of the patient's history were reviewed and updated as appropriate: allergies, current medications, past family history, past medical history, past social history, past surgical history and problem list.    Allergies   Allergen Reactions   • Sulfa Antibiotics Rash       Review of Systems  1. Constitutional: Negative for fever. Negative for chills, diaphoresis, fatigue and unexpected weight change.   2. HENT: No dysphagia; no changes to vision/hearing/smell/taste; no epistaxis  3. Eyes: Negative for redness and visual disturbance.   4. Respiratory: negative for shortness of breath. Negative for chest pain . Negative for cough and chest tightness.   5. Cardiovascular: Negative for chest pain and palpitations.   6. Gastrointestinal: Negative for abdominal distention, abdominal pain and blood in stool.   7. Endocrine: Negative for cold intolerance and heat intolerance.   8. Genitourinary: Negative for difficulty urinating, dysuria and frequency.   9. Musculoskeletal: Improved pain to the posterior right  "calf, without knowledge of trauma.   10. Skin: Negative for color change, rash and wound.   11. Neurological: Negative for syncope, weakness and headaches.   12. Hematological: Negative for adenopathy. Does not bruise/bleed easily.   13. Psychiatric/Behavioral: Negative for confusion. The patient is not nervous/anxious.    Objective     Physical Exam   Vital Signs: /82   Pulse 64   Temp 97.6 °F (36.4 °C)   Resp 12   Ht 165.1 cm (65\")   SpO2 97%     General Appearance: alert, oriented x 3, no acute distress.  Skin: warm and dry.   HEENT: Atraumatic.  pupils round and reactive to light and accommodation, oral mucosa pink and moist.  Nares patent without epistaxis.  External auditory canals are patent tympanic membranes intact.  Neck: supple, no JVD, trachea midline.  No thyromegaly  Lungs: CTA, unlabored breathing effort.  Heart: RRR, normal S1 and S2, no S3, no rub.  Abdomen: soft, non-tender, no palpable bladder, present bowel sounds to auscultation ×4.  No guarding or rigidity.  Extremities: no clubbing, cyanosis.  Good range of motion actively and passively.  Symmetric muscle strength and development .  Mild nonpitting edema to right lower extremity.  [place] test negative.  Homans nonspecific.  No ligamentous laxity.  Telma's negative.  Dorsiflexion and plantar flexion bilaterally normal to the feet.  Dorsalis pedis and posterior tibialis pulses 2+.  Age appropriate and expected varicosities to bilateral lower extremities.  No superficial skin changes.  Patient is able to toe raise and heel rock appropriately.  Mild pain noted on palpation of gastrocnemius, proximal portion and central belly of muscle.  Neuro: normal speech and mental status.  Cranial nerves II through XII intact.  No anosmia. DTR 2+; proprioception intact.  No focal motor/sensory deficits.    Assessment and Plan      Assessment:   Ruthann was seen today for follow-up.    Diagnoses and all orders for this visit:    Gastrocnemius " tear, right, subsequent encounter        Plan:  Plan to continue use of high tide pneumatic walking boot.  Continue ice massage, as well as other modalities of therapy with next PT session.  Trial of another 3 weeks of PWB, with use of compressive stocking/sock when not wearing boot.  Discussion Summary:    Discussed plan of care in detail with pt today; pt verb understanding and agrees; counseled for approx 10 min of total 15 min exam time.  Follow up:  Return in about 3 weeks (around 10/2/2018) for Recheck.     There are no Patient Instructions on file for this visit.    Mars Desai DO  09/11/18  3:57 PM    Please note that portions of this note may have been completed with a voice recognition program. Efforts were made to edit the dictations, but occasionally words are mistranscribed.

## 2018-09-14 ENCOUNTER — TREATMENT (OUTPATIENT)
Dept: PHYSICAL THERAPY | Facility: CLINIC | Age: 58
End: 2018-09-14

## 2018-09-14 PROCEDURE — 97116 GAIT TRAINING THERAPY: CPT | Performed by: PHYSICAL THERAPIST

## 2018-09-14 PROCEDURE — 97530 THERAPEUTIC ACTIVITIES: CPT | Performed by: PHYSICAL THERAPIST

## 2018-09-14 PROCEDURE — 97110 THERAPEUTIC EXERCISES: CPT | Performed by: PHYSICAL THERAPIST

## 2018-09-14 NOTE — PROGRESS NOTES
Subjective   Ruthann Morales reports: 4/10 pain at rest. States she would like to finish the week and then hold on therapy     Objective     Right knee AAROM: 0-93 deg    Squat: pt able to squat to approximately 75 deg to  object off of floor, cues for good form    Palpation: very mild tenderness mid proximal gastroc       See Exercise, Manual, and Modality Logs for complete treatment.       Assessment/Plan  Patient would like to hold therapy at this time due to financial reasons and continue to do exercises at home and return within the next couple of weeks if no improvement. Patient educated on various forms of exercises for general knee health and advised to continue stretches and exercises for gastroc tear. Patient reports improvement in function, pain and strength.   Other  Holding therapy at pt request.          Manual Therapy:         mins  32707;  Therapeutic Exercise:    35     mins  67723;     Neuromuscular Broderick:        mins  15160;    Therapeutic Activity:     8     mins  49344;     Gait Training:      10     mins  24755;     Ultrasound:          mins  91967;   Iontophoresis          mins  36737   Electrical Stimulation:         mins  60285 ( );  Dry Needling          mins self-pay  Fluidotherapy          mins 19135    Timed Treatment:   53   mins   Total Treatment:     53   mins    Socorro Washington, PAPO  Physical Therapist

## 2018-10-02 ENCOUNTER — OFFICE VISIT (OUTPATIENT)
Dept: FAMILY MEDICINE CLINIC | Facility: CLINIC | Age: 58
End: 2018-10-02

## 2018-10-02 VITALS
DIASTOLIC BLOOD PRESSURE: 84 MMHG | RESPIRATION RATE: 12 BRPM | SYSTOLIC BLOOD PRESSURE: 138 MMHG | HEART RATE: 64 BPM | OXYGEN SATURATION: 98 % | TEMPERATURE: 97.6 F | HEIGHT: 65 IN

## 2018-10-02 DIAGNOSIS — S86.111S GASTROCNEMIUS STRAIN, RIGHT, SEQUELA: Primary | ICD-10-CM

## 2018-10-02 DIAGNOSIS — L29.9 PRURITIC CONDITION: ICD-10-CM

## 2018-10-02 PROCEDURE — 99213 OFFICE O/P EST LOW 20 MIN: CPT | Performed by: FAMILY MEDICINE

## 2018-10-02 RX ORDER — HYDROXYZINE HYDROCHLORIDE 10 MG/1
10 TABLET, FILM COATED ORAL 3 TIMES DAILY PRN
Qty: 45 TABLET | Refills: 3 | Status: SHIPPED | OUTPATIENT
Start: 2018-10-02 | End: 2019-08-30

## 2018-10-02 NOTE — PROGRESS NOTES
Established Patient        Chief Complaint:   Chief Complaint   Patient presents with   • Leg Pain     4 week follow up, still unable to walk long distances        Ruthann Morales is a 57 y.o. female    History of Present Illness:     Patient is a 57-year-old female who is here for scheduled follow-up appointment.  She is continue to utilize high tide pneumatic walking boot with significant improvement to the discomfort to the right posterior calf.  Massage therapy is provided significant improvement to her lower extremity as provided by physical therapy.  Patient also complains of a persistent and chronic pruritus of the generalized skin.  She states this is been problematic for several years now.  She attempts to utilize Benadryl creams and other over-the-counter preps to aid in the symptom improvement.  She has not identified a source of this.  Massage    Subjective     The following portions of the patient's history were reviewed and updated as appropriate: allergies, current medications, past family history, past medical history, past social history, past surgical history and problem list.    Allergies   Allergen Reactions   • Sulfa Antibiotics Rash       Review of Systems  1. Constitutional: Negative for fever. Negative for chills, diaphoresis, fatigue and unexpected weight change.   2. HENT: No dysphagia; no changes to vision/hearing/smell/taste; no epistaxis  3. Eyes: Negative for redness and visual disturbance.   4. Respiratory: negative for shortness of breath. Negative for chest pain . Negative for cough and chest tightness.   5. Cardiovascular: Negative for chest pain and palpitations.   6. Gastrointestinal: Negative for abdominal distention, abdominal pain and blood in stool.   7. Endocrine: Negative for cold intolerance and heat intolerance.   8. Genitourinary: Negative for difficulty urinating, dysuria and frequency.   9. Musculoskeletal: Improved pain to the posterior right calf, without  "knowledge of trauma.   10. Skin: Negative for color change, rash and wound.   11. Neurological: Negative for syncope, weakness and headaches.   12. Hematological: Negative for adenopathy. Does not bruise/bleed easily.   13. Psychiatric/Behavioral: Negative for confusion. The patient is not nervous/anxious.    Objective     Physical Exam   Vital Signs: /84   Pulse 64   Temp 97.6 °F (36.4 °C)   Resp 12   Ht 165.1 cm (65\")   SpO2 98%     General Appearance: alert, oriented x 3, no acute distress.  Skin: warm and dry.   HEENT: Atraumatic.  pupils round and reactive to light and accommodation, oral mucosa pink and moist.  Nares patent without epistaxis.  External auditory canals are patent tympanic membranes intact.  Neck: supple, no JVD, trachea midline.  No thyromegaly  Lungs: CTA, unlabored breathing effort.  Heart: RRR, normal S1 and S2, no S3, no rub.  Abdomen: soft, non-tender, no palpable bladder, present bowel sounds to auscultation ×4.  No guarding or rigidity.  Extremities: no clubbing, cyanosis.  Good range of motion actively and passively.  Symmetric muscle strength and development .  Mild nonpitting edema to right lower extremity.  [place] test negative.  Homans nonspecific.  No ligamentous laxity.  Telma's negative.  Dorsiflexion and plantar flexion bilaterally normal to the feet.  Dorsalis pedis and posterior tibialis pulses 2+.  Age appropriate and expected varicosities to bilateral lower extremities.  No superficial skin changes.  Patient is able to toe raise and heel rock appropriately.  Mild pain noted on palpation of gastrocnemius, proximal portion and central belly of muscle.  Neuro: normal speech and mental status.  Cranial nerves II through XII intact.  No anosmia. DTR 2+; proprioception intact.  No focal motor/sensory deficits.    Assessment and Plan      Assessment:   Ruthann was seen today for leg pain.    Diagnoses and all orders for this visit:    Gastrocnemius strain, right, " sequela    Pruritic condition  -     hydrOXYzine (ATARAX) 10 MG tablet; Take 1 tablet by mouth 3 (Three) Times a Day As Needed for Itching.        Plan:  Planned D/C of PWB; continue massage therapy.  Ice therapy and stretching additionally.    Will add prn use of hydroxyzine hydrochloride 10mg po TID prn pruritis.  Discussion Summary:    Discussed plan of care in detail with pt today; pt verb understanding and agrees; counseled for approx 10 min of total 15 min exam time.  Follow up:  No Follow-up on file.     There are no Patient Instructions on file for this visit.    Mars Desai,   10/02/18  11:43 AM    Please note that portions of this note may have been completed with a voice recognition program. Efforts were made to edit the dictations, but occasionally words are mistranscribed.

## 2019-01-30 RX ORDER — LEVOTHYROXINE SODIUM 0.1 MG/1
TABLET ORAL
Qty: 30 TABLET | Refills: 11 | Status: SHIPPED | OUTPATIENT
Start: 2019-01-30 | End: 2019-08-30 | Stop reason: SDUPTHER

## 2019-03-07 ENCOUNTER — APPOINTMENT (OUTPATIENT)
Dept: MAMMOGRAPHY | Facility: HOSPITAL | Age: 59
End: 2019-03-07

## 2019-03-08 ENCOUNTER — HOSPITAL ENCOUNTER (OUTPATIENT)
Dept: MAMMOGRAPHY | Facility: HOSPITAL | Age: 59
Discharge: HOME | End: 2019-03-08
Admitting: OBSTETRICS & GYNECOLOGY

## 2019-03-08 DIAGNOSIS — Z12.31 SCREENING MAMMOGRAM, ENCOUNTER FOR: ICD-10-CM

## 2019-03-08 PROCEDURE — 77067 SCR MAMMO BI INCL CAD: CPT

## 2019-03-08 PROCEDURE — 77063 BREAST TOMOSYNTHESIS BI: CPT

## 2019-08-30 ENCOUNTER — OFFICE VISIT (OUTPATIENT)
Dept: ENDOCRINOLOGY | Facility: CLINIC | Age: 59
End: 2019-08-30

## 2019-08-30 VITALS
BODY MASS INDEX: 36.96 KG/M2 | HEART RATE: 57 BPM | HEIGHT: 65 IN | WEIGHT: 221.8 LBS | DIASTOLIC BLOOD PRESSURE: 74 MMHG | SYSTOLIC BLOOD PRESSURE: 132 MMHG | OXYGEN SATURATION: 98 %

## 2019-08-30 DIAGNOSIS — E03.9 ACQUIRED HYPOTHYROIDISM: ICD-10-CM

## 2019-08-30 DIAGNOSIS — E78.00 PURE HYPERCHOLESTEROLEMIA: Primary | ICD-10-CM

## 2019-08-30 DIAGNOSIS — R73.09 ELEVATED GLUCOSE: ICD-10-CM

## 2019-08-30 LAB
ALBUMIN SERPL-MCNC: 4.8 G/DL (ref 3.5–5.2)
ALBUMIN/GLOB SERPL: 1.7 G/DL
ALP SERPL-CCNC: 95 U/L (ref 39–117)
ALT SERPL W P-5'-P-CCNC: 29 U/L (ref 1–33)
ANION GAP SERPL CALCULATED.3IONS-SCNC: 11.7 MMOL/L (ref 5–15)
AST SERPL-CCNC: 20 U/L (ref 1–32)
BILIRUB SERPL-MCNC: 0.4 MG/DL (ref 0.2–1.2)
BUN BLD-MCNC: 13 MG/DL (ref 6–20)
BUN/CREAT SERPL: 16.5 (ref 7–25)
CALCIUM SPEC-SCNC: 9.8 MG/DL (ref 8.6–10.5)
CHLORIDE SERPL-SCNC: 103 MMOL/L (ref 98–107)
CHOLEST SERPL-MCNC: 127 MG/DL (ref 0–200)
CO2 SERPL-SCNC: 25.3 MMOL/L (ref 22–29)
CREAT BLD-MCNC: 0.79 MG/DL (ref 0.57–1)
GFR SERPL CREATININE-BSD FRML MDRD: 74 ML/MIN/1.73
GLOBULIN UR ELPH-MCNC: 2.9 GM/DL
GLUCOSE BLD-MCNC: 94 MG/DL (ref 65–99)
HBA1C MFR BLD: 5.4 % (ref 4.8–5.6)
HDLC SERPL-MCNC: 40 MG/DL (ref 40–60)
LDLC SERPL CALC-MCNC: 69 MG/DL (ref 0–100)
LDLC/HDLC SERPL: 1.72 {RATIO}
POTASSIUM BLD-SCNC: 4.4 MMOL/L (ref 3.5–5.2)
PROT SERPL-MCNC: 7.7 G/DL (ref 6–8.5)
SODIUM BLD-SCNC: 140 MMOL/L (ref 136–145)
TRIGL SERPL-MCNC: 91 MG/DL (ref 0–150)
TSH SERPL DL<=0.05 MIU/L-ACNC: 2.22 UIU/ML (ref 0.27–4.2)
VLDLC SERPL-MCNC: 18.2 MG/DL (ref 5–40)

## 2019-08-30 PROCEDURE — 84443 ASSAY THYROID STIM HORMONE: CPT | Performed by: INTERNAL MEDICINE

## 2019-08-30 PROCEDURE — 99214 OFFICE O/P EST MOD 30 MIN: CPT | Performed by: INTERNAL MEDICINE

## 2019-08-30 PROCEDURE — 80053 COMPREHEN METABOLIC PANEL: CPT | Performed by: INTERNAL MEDICINE

## 2019-08-30 PROCEDURE — 80061 LIPID PANEL: CPT | Performed by: INTERNAL MEDICINE

## 2019-08-30 PROCEDURE — 83036 HEMOGLOBIN GLYCOSYLATED A1C: CPT | Performed by: INTERNAL MEDICINE

## 2019-08-30 RX ORDER — LEVOTHYROXINE SODIUM 0.1 MG/1
TABLET ORAL
Qty: 30 TABLET | Refills: 11 | Status: SHIPPED | OUTPATIENT
Start: 2019-08-30 | End: 2020-08-01

## 2019-08-30 NOTE — ASSESSMENT & PLAN NOTE
Blood sugar and 90 day average sugar reviewed  Check hgn a1c   Discussed high fasting sugar, role of diet and fitness reviewed in prevention of diabetes

## 2019-08-30 NOTE — PROGRESS NOTES
Ruthann Morales 58 y.o.  CC:Yearly FU; Hypothyroidism; Hyperlipidemia; and Vitamin D Deficiency      [place]: Yearly FU; Hypothyroidism; Hyperlipidemia; and Vitamin D Deficiency    Is building a house- more stress  Is on synthroid 100 mcg daily   Is on vitamin D supplement   Is on low fat diet   Also high fasting glucose- discussed diet  Still getting massage for thoracic outlet syndrome     Allergies   Allergen Reactions   • Sulfa Antibiotics Rash       Current Outpatient Medications:   •  Cholecalciferol (VITAMIN D) 1000 UNITS tablet, Take 2 tablets by mouth Daily., Disp: , Rfl:   •  levothyroxine (SYNTHROID, LEVOTHROID) 100 MCG tablet, Take 1 tablet by mouth Daily., Disp: 30 tablet, Rfl: 11  Patient Active Problem List    Diagnosis   • Right foot pain [M79.671]   • Leg cramps [R25.2]   • Pure hypercholesterolemia [E78.00]   • Rash [R21]   • Chronic fatigue [R53.82]   • Abdominal pain [R10.9]   • Hypothyroidism [E03.9]   • Irreducible incisional hernia [K43.0]     Review of Systems   Constitutional: Negative for activity change, appetite change, chills, diaphoresis, fatigue, fever and unexpected weight change.   HENT: Negative for congestion, dental problem, drooling, ear discharge, ear pain, facial swelling, hearing loss, mouth sores, nosebleeds, postnasal drip, rhinorrhea, sinus pressure, sneezing, sore throat, tinnitus, trouble swallowing and voice change.    Eyes: Negative for photophobia, pain, discharge, redness, itching and visual disturbance.   Respiratory: Negative for apnea, cough, choking, chest tightness, shortness of breath, wheezing and stridor.    Cardiovascular: Negative for chest pain, palpitations and leg swelling.   Gastrointestinal: Negative for abdominal distention, abdominal pain, anal bleeding, blood in stool, constipation, diarrhea, nausea, rectal pain and vomiting.   Endocrine: Negative for cold intolerance, heat intolerance, polydipsia, polyphagia and polyuria.   Genitourinary: Negative  "for decreased urine volume, difficulty urinating, dysuria, enuresis, flank pain, frequency, genital sores, hematuria and urgency.   Musculoskeletal: Negative for arthralgias, back pain, gait problem, joint swelling, myalgias, neck pain and neck stiffness.   Skin: Negative for color change, pallor, rash and wound.   Allergic/Immunologic: Negative for environmental allergies, food allergies and immunocompromised state.   Neurological: Negative for dizziness, tremors, seizures, syncope, facial asymmetry, speech difficulty, weakness, light-headedness, numbness and headaches.   Hematological: Negative for adenopathy. Does not bruise/bleed easily.   Psychiatric/Behavioral: Negative for agitation, behavioral problems, confusion, decreased concentration, dysphoric mood, hallucinations, self-injury, sleep disturbance and suicidal ideas. The patient is not nervous/anxious and is not hyperactive.      Social History     Socioeconomic History   • Marital status:      Spouse name: Not on file   • Number of children: Not on file   • Years of education: Not on file   • Highest education level: Not on file   Tobacco Use   • Smoking status: Never Smoker   • Smokeless tobacco: Never Used   Substance and Sexual Activity   • Alcohol use: No     Comment: occasionally   • Drug use: No   • Sexual activity: Defer     Comment:      Family History   Problem Relation Age of Onset   • Cancer Mother    • Breast cancer Mother    • Cancer Father    • Stroke Other    • Diabetes Other    • Heart attack Other    • Breast cancer Maternal Aunt      /74   Pulse 57   Ht 165.1 cm (65\")   Wt 101 kg (221 lb 12.8 oz)   SpO2 98%   Breastfeeding? No   BMI 36.91 kg/m²   Physical Exam   Constitutional: She is oriented to person, place, and time. She appears well-developed and well-nourished.   HENT:   Head: Normocephalic and atraumatic.   Nose: Nose normal.   Mouth/Throat: Oropharynx is clear and moist.   Eyes: Conjunctivae, EOM and " lids are normal. Pupils are equal, round, and reactive to light.   Neck: Trachea normal and normal range of motion. Neck supple. Carotid bruit is not present. No tracheal deviation present. No thyroid mass and no thyromegaly present.   Cardiovascular: Normal rate, regular rhythm, normal heart sounds and intact distal pulses. Exam reveals no gallop and no friction rub.   No murmur heard.  Pulmonary/Chest: Effort normal and breath sounds normal. No respiratory distress. She has no wheezes.   Musculoskeletal: Normal range of motion. She exhibits no edema or deformity.   Lymphadenopathy:     She has no cervical adenopathy.   Neurological: She is alert and oriented to person, place, and time. She has normal reflexes. She displays normal reflexes. No cranial nerve deficit.   Skin: Skin is warm and dry. No rash noted. No cyanosis or erythema. Nails show no clubbing.   Psychiatric: She has a normal mood and affect. Her speech is normal and behavior is normal. Judgment and thought content normal. Cognition and memory are normal.   Nursing note and vitals reviewed.    Results for orders placed or performed in visit on 08/31/18   Comprehensive Metabolic Panel   Result Value Ref Range    Glucose 105 (H) 74 - 98 mg/dL    BUN 13 7 - 20 mg/dL    Creatinine 0.70 0.60 - 1.30 mg/dL    Sodium 143 137 - 145 mmol/L    Potassium 4.4 3.5 - 5.1 mmol/L    Chloride 107 98 - 107 mmol/L    CO2 23.0 (L) 26.0 - 30.0 mmol/L    Calcium 9.6 8.4 - 10.2 mg/dL    Total Protein 7.7 6.3 - 8.2 g/dL    Albumin 4.70 3.50 - 5.00 g/dL    ALT (SGPT) 60 13 - 69 U/L    AST (SGOT) 43 15 - 46 U/L    Alkaline Phosphatase 100 38 - 126 U/L    Total Bilirubin 0.6 0.2 - 1.3 mg/dL    eGFR Non African Amer 86 >60 mL/min/1.73    Globulin 3.0 gm/dL    A/G Ratio 1.6 1.0 - 2.0 g/dL    BUN/Creatinine Ratio 18.6 7.1 - 23.5    Anion Gap 17.4 10.0 - 20.0 mmol/L   Lipid Panel   Result Value Ref Range    Total Cholesterol 152 0 - 199 mg/dL    Triglycerides 135 <150 mg/dL    HDL  Cholesterol 44 40 - 60 mg/dL    LDL Cholesterol  81 0 - 99 mg/dL    VLDL Cholesterol 27 mg/dL    LDL/HDL Ratio 1.84    TSH   Result Value Ref Range    TSH 3.550 0.470 - 4.680 mIU/mL   T4, Free   Result Value Ref Range    Free T4 0.91 0.78 - 2.19 ng/dL   Vitamin D 25 Hydroxy   Result Value Ref Range    25 Hydroxy, Vitamin D 52.3 ng/ml   Hepatitis C Antibody   Result Value Ref Range    Hep C Virus Ab <0.1 0.0 - 0.9 s/co ratio     Problem List Items Addressed This Visit        Cardiovascular and Mediastinum    Pure hypercholesterolemia - Primary     Is on low fat diet   Check flp          Relevant Orders    Comprehensive Metabolic Panel    Lipid Panel       Endocrine    Hypothyroidism     On synthroid 100 mcg daily   Check tsh          Relevant Medications    levothyroxine (SYNTHROID, LEVOTHROID) 100 MCG tablet    Other Relevant Orders    TSH       Other    Elevated glucose     Blood sugar and 90 day average sugar reviewed  Check hgn a1c   Discussed high fasting sugar, role of diet and fitness reviewed in prevention of diabetes          Relevant Orders    Hemoglobin A1c        Return in about 6 months (around 2/29/2020) for Recheck 30 min .    Sarah Conde MA  Signed Kia Morelos MD

## 2020-02-28 ENCOUNTER — OFFICE VISIT (OUTPATIENT)
Dept: ENDOCRINOLOGY | Facility: CLINIC | Age: 60
End: 2020-02-28

## 2020-02-28 VITALS
HEART RATE: 64 BPM | DIASTOLIC BLOOD PRESSURE: 84 MMHG | WEIGHT: 222.4 LBS | HEIGHT: 65 IN | OXYGEN SATURATION: 97 % | SYSTOLIC BLOOD PRESSURE: 122 MMHG | BODY MASS INDEX: 37.05 KG/M2

## 2020-02-28 DIAGNOSIS — E03.9 ACQUIRED HYPOTHYROIDISM: ICD-10-CM

## 2020-02-28 DIAGNOSIS — R73.09 ELEVATED GLUCOSE: Primary | ICD-10-CM

## 2020-02-28 DIAGNOSIS — E78.00 PURE HYPERCHOLESTEROLEMIA: ICD-10-CM

## 2020-02-28 LAB
25(OH)D3 SERPL-MCNC: 37.9 NG/ML (ref 30–100)
ALBUMIN SERPL-MCNC: 4.7 G/DL (ref 3.5–5.2)
ALBUMIN/GLOB SERPL: 1.9 G/DL
ALP SERPL-CCNC: 88 U/L (ref 39–117)
ALT SERPL W P-5'-P-CCNC: 24 U/L (ref 1–33)
ANION GAP SERPL CALCULATED.3IONS-SCNC: 13.8 MMOL/L (ref 5–15)
AST SERPL-CCNC: 17 U/L (ref 1–32)
BILIRUB SERPL-MCNC: 0.4 MG/DL (ref 0.2–1.2)
BUN BLD-MCNC: 17 MG/DL (ref 8–23)
BUN/CREAT SERPL: 23.6 (ref 7–25)
CALCIUM SPEC-SCNC: 9.6 MG/DL (ref 8.6–10.5)
CHLORIDE SERPL-SCNC: 102 MMOL/L (ref 98–107)
CHOLEST SERPL-MCNC: 142 MG/DL (ref 0–200)
CO2 SERPL-SCNC: 25.2 MMOL/L (ref 22–29)
CREAT BLD-MCNC: 0.72 MG/DL (ref 0.57–1)
GFR SERPL CREATININE-BSD FRML MDRD: 83 ML/MIN/1.73
GLOBULIN UR ELPH-MCNC: 2.5 GM/DL
GLUCOSE BLD-MCNC: 88 MG/DL (ref 65–99)
GLUCOSE BLDC GLUCOMTR-MCNC: 102 MG/DL (ref 70–130)
HBA1C MFR BLD: 5.5 %
HDLC SERPL-MCNC: 40 MG/DL (ref 40–60)
LDLC SERPL CALC-MCNC: 80 MG/DL (ref 0–100)
LDLC/HDLC SERPL: 1.99 {RATIO}
POTASSIUM BLD-SCNC: 4.4 MMOL/L (ref 3.5–5.2)
PROT SERPL-MCNC: 7.2 G/DL (ref 6–8.5)
SODIUM BLD-SCNC: 141 MMOL/L (ref 136–145)
T4 FREE SERPL-MCNC: 1.27 NG/DL (ref 0.93–1.7)
TRIGL SERPL-MCNC: 112 MG/DL (ref 0–150)
TSH SERPL DL<=0.05 MIU/L-ACNC: 1.16 UIU/ML (ref 0.27–4.2)
VLDLC SERPL-MCNC: 22.4 MG/DL (ref 5–40)

## 2020-02-28 PROCEDURE — 99214 OFFICE O/P EST MOD 30 MIN: CPT | Performed by: INTERNAL MEDICINE

## 2020-02-28 PROCEDURE — 82306 VITAMIN D 25 HYDROXY: CPT | Performed by: INTERNAL MEDICINE

## 2020-02-28 PROCEDURE — 82947 ASSAY GLUCOSE BLOOD QUANT: CPT | Performed by: INTERNAL MEDICINE

## 2020-02-28 PROCEDURE — 84443 ASSAY THYROID STIM HORMONE: CPT | Performed by: INTERNAL MEDICINE

## 2020-02-28 PROCEDURE — 83036 HEMOGLOBIN GLYCOSYLATED A1C: CPT | Performed by: INTERNAL MEDICINE

## 2020-02-28 PROCEDURE — 80053 COMPREHEN METABOLIC PANEL: CPT | Performed by: INTERNAL MEDICINE

## 2020-02-28 PROCEDURE — 84439 ASSAY OF FREE THYROXINE: CPT | Performed by: INTERNAL MEDICINE

## 2020-02-28 PROCEDURE — 80061 LIPID PANEL: CPT | Performed by: INTERNAL MEDICINE

## 2020-02-28 NOTE — PROGRESS NOTES
Ruthann Morales 59 y.o.  CC:Follow-up; Hypothyroidism; Hyperlipidemia; Vitamin D Deficiency; and Hyperglycemia      [place]: Follow-up; Hypothyroidism; Hyperlipidemia; Vitamin D Deficiency; and Hyperglycemia    bp is good   Is on synthroid 100 mcg daily   Is on vitamin D supplement   Blood sugar and 90 day average sugar reviewed  Results for orders placed or performed in visit on 02/28/20   POC Glycosylated Hemoglobin (Hb A1C)   Result Value Ref Range    Hemoglobin A1C 5.5 %   POC Glucose Fingerstick   Result Value Ref Range    Glucose 102 70 - 130 mg/dL   average sugar is good/normal   She is watching diet and weight is stable     Allergies   Allergen Reactions   • Sulfa Antibiotics Rash       Current Outpatient Medications:   •  Cholecalciferol (VITAMIN D) 1000 UNITS tablet, Take 2 tablets by mouth Daily., Disp: , Rfl:   •  levothyroxine (SYNTHROID, LEVOTHROID) 100 MCG tablet, Take 1 tablet by mouth Daily., Disp: 30 tablet, Rfl: 11  Patient Active Problem List    Diagnosis   • Elevated glucose [R73.09]   • Right foot pain [M79.671]   • Leg cramps [R25.2]   • Pure hypercholesterolemia [E78.00]   • Rash [R21]   • Chronic fatigue [R53.82]   • Abdominal pain [R10.9]   • Hypothyroidism [E03.9]   • Irreducible incisional hernia [K43.0]     Review of Systems   Constitutional: Negative for activity change, appetite change, chills, diaphoresis, fatigue, fever and unexpected weight change.   HENT: Negative for congestion, dental problem, drooling, ear discharge, ear pain, facial swelling, hearing loss, mouth sores, nosebleeds, postnasal drip, rhinorrhea, sinus pressure, sneezing, sore throat, tinnitus, trouble swallowing and voice change.    Eyes: Negative for photophobia, pain, discharge, redness, itching and visual disturbance.   Respiratory: Negative for apnea, cough, choking, chest tightness, shortness of breath, wheezing and stridor.    Cardiovascular: Negative for chest pain, palpitations and leg swelling.  "  Gastrointestinal: Negative for abdominal distention, abdominal pain, anal bleeding, blood in stool, constipation, diarrhea, nausea, rectal pain and vomiting.   Endocrine: Negative for cold intolerance, heat intolerance, polydipsia, polyphagia and polyuria.   Genitourinary: Negative for decreased urine volume, difficulty urinating, dysuria, enuresis, flank pain, frequency, genital sores, hematuria and urgency.   Musculoskeletal: Negative for arthralgias, back pain, gait problem, joint swelling, myalgias, neck pain and neck stiffness.   Skin: Negative for color change, pallor, rash and wound.   Allergic/Immunologic: Negative for environmental allergies, food allergies and immunocompromised state.   Neurological: Negative for dizziness, tremors, seizures, syncope, facial asymmetry, speech difficulty, weakness, light-headedness, numbness and headaches.   Hematological: Negative for adenopathy. Does not bruise/bleed easily.   Psychiatric/Behavioral: Negative for agitation, behavioral problems, confusion, decreased concentration, dysphoric mood, hallucinations, self-injury, sleep disturbance and suicidal ideas. The patient is not nervous/anxious and is not hyperactive.      Social History     Socioeconomic History   • Marital status:      Spouse name: Not on file   • Number of children: Not on file   • Years of education: Not on file   • Highest education level: Not on file   Tobacco Use   • Smoking status: Never Smoker   • Smokeless tobacco: Never Used   Substance and Sexual Activity   • Alcohol use: No     Comment: occasionally   • Drug use: No   • Sexual activity: Defer     Comment:      Family History   Problem Relation Age of Onset   • Cancer Mother    • Breast cancer Mother    • Cancer Father    • Stroke Other    • Diabetes Other    • Heart attack Other    • Breast cancer Maternal Aunt      /84   Pulse 64   Ht 165.1 cm (65\")   Wt 101 kg (222 lb 6.4 oz)   SpO2 97%   BMI 37.01 kg/m² "   Physical Exam   Constitutional: She is oriented to person, place, and time. She appears well-developed and well-nourished.   HENT:   Head: Normocephalic and atraumatic.   Nose: Nose normal.   Mouth/Throat: Oropharynx is clear and moist.   Eyes: Pupils are equal, round, and reactive to light. Conjunctivae, EOM and lids are normal.   Neck: Trachea normal and normal range of motion. Neck supple. Carotid bruit is not present. No tracheal deviation present. No thyroid mass and no thyromegaly present.   Cardiovascular: Normal rate, regular rhythm, normal heart sounds and intact distal pulses. Exam reveals no gallop and no friction rub.   No murmur heard.  Pulmonary/Chest: Effort normal and breath sounds normal. No respiratory distress. She has no wheezes.   Musculoskeletal: Normal range of motion. She exhibits no edema or deformity.   Lymphadenopathy:     She has no cervical adenopathy.   Neurological: She is alert and oriented to person, place, and time. She has normal reflexes. She displays normal reflexes. No cranial nerve deficit.   Skin: Skin is warm and dry. No rash noted. No cyanosis or erythema. Nails show no clubbing.   Psychiatric: She has a normal mood and affect. Her speech is normal and behavior is normal. Judgment and thought content normal. Cognition and memory are normal.   Nursing note and vitals reviewed.    Results for orders placed or performed in visit on 08/30/19   Comprehensive Metabolic Panel   Result Value Ref Range    Glucose 94 65 - 99 mg/dL    BUN 13 6 - 20 mg/dL    Creatinine 0.79 0.57 - 1.00 mg/dL    Sodium 140 136 - 145 mmol/L    Potassium 4.4 3.5 - 5.2 mmol/L    Chloride 103 98 - 107 mmol/L    CO2 25.3 22.0 - 29.0 mmol/L    Calcium 9.8 8.6 - 10.5 mg/dL    Total Protein 7.7 6.0 - 8.5 g/dL    Albumin 4.80 3.50 - 5.20 g/dL    ALT (SGPT) 29 1 - 33 U/L    AST (SGOT) 20 1 - 32 U/L    Alkaline Phosphatase 95 39 - 117 U/L    Total Bilirubin 0.4 0.2 - 1.2 mg/dL    eGFR Non  Amer 74 >60  mL/min/1.73    Globulin 2.9 gm/dL    A/G Ratio 1.7 g/dL    BUN/Creatinine Ratio 16.5 7.0 - 25.0    Anion Gap 11.7 5.0 - 15.0 mmol/L   Hemoglobin A1c   Result Value Ref Range    Hemoglobin A1C 5.40 4.80 - 5.60 %   Lipid Panel   Result Value Ref Range    Total Cholesterol 127 0 - 200 mg/dL    Triglycerides 91 0 - 150 mg/dL    HDL Cholesterol 40 40 - 60 mg/dL    LDL Cholesterol  69 0 - 100 mg/dL    VLDL Cholesterol 18.2 5 - 40 mg/dL    LDL/HDL Ratio 1.72    TSH   Result Value Ref Range    TSH 2.220 0.270 - 4.200 uIU/mL     Problem List Items Addressed This Visit        Cardiovascular and Mediastinum    Pure hypercholesterolemia     Is on low fat diet  - check flp          Relevant Orders    Lipid Panel       Endocrine    Hypothyroidism     Is on levothyroxine 100 mcg daily   Check tfts          Relevant Orders    T4, Free    TSH    Vitamin D 25 Hydroxy       Other    Elevated glucose - Primary     Blood sugar and 90 day average sugar reviewed  Results for orders placed or performed in visit on 02/28/20   POC Glycosylated Hemoglobin (Hb A1C)   Result Value Ref Range    Hemoglobin A1C 5.5 %   POC Glucose Fingerstick   Result Value Ref Range    Glucose 102 70 - 130 mg/dL      No changes recommended          Relevant Orders    POC Glycosylated Hemoglobin (Hb A1C) (Completed)    POC Glucose Fingerstick (Completed)    Comprehensive Metabolic Panel        Return in about 6 months (around 8/28/2020) for Recheck 30 min .    Sarah Conde MA  Signed Kia Morelos MD

## 2020-02-29 NOTE — ASSESSMENT & PLAN NOTE
Blood sugar and 90 day average sugar reviewed  Results for orders placed or performed in visit on 02/28/20   POC Glycosylated Hemoglobin (Hb A1C)   Result Value Ref Range    Hemoglobin A1C 5.5 %   POC Glucose Fingerstick   Result Value Ref Range    Glucose 102 70 - 130 mg/dL      No changes recommended

## 2020-03-10 ENCOUNTER — HOSPITAL ENCOUNTER (OUTPATIENT)
Dept: MAMMOGRAPHY | Facility: HOSPITAL | Age: 60
End: 2020-03-10

## 2020-03-11 ENCOUNTER — HOSPITAL ENCOUNTER (OUTPATIENT)
Dept: MAMMOGRAPHY | Facility: HOSPITAL | Age: 60
Discharge: HOME | End: 2020-03-11
Admitting: OBSTETRICS & GYNECOLOGY

## 2020-03-11 DIAGNOSIS — Z12.31 SCREENING MAMMOGRAM, ENCOUNTER FOR: ICD-10-CM

## 2020-03-11 PROCEDURE — 77067 SCR MAMMO BI INCL CAD: CPT

## 2020-03-11 PROCEDURE — 77063 BREAST TOMOSYNTHESIS BI: CPT

## 2020-05-01 ENCOUNTER — TELEPHONE (OUTPATIENT)
Dept: ENDOCRINOLOGY | Facility: CLINIC | Age: 60
End: 2020-05-01

## 2020-05-01 DIAGNOSIS — E03.9 ACQUIRED HYPOTHYROIDISM: Primary | ICD-10-CM

## 2020-05-01 NOTE — TELEPHONE ENCOUNTER
Pt wanted to know if you would order labs for her (to be done in [place])   She is having more hair falling out, hot flashes and weight gain.    Please call pt  [phone]

## 2020-05-05 NOTE — TELEPHONE ENCOUNTER
PATIENT IS CALLING TO CHECK STATUS OF LAB ORDERS. SHE WOULD LIKE TO HAVE THEM DRAWN BEFORE Friday. SHE NEEDS ORDERS SENT TO [facility] IN [facility]. PATIENTS NUMBER [phone]

## 2020-05-05 NOTE — TELEPHONE ENCOUNTER
Spoke with pt and told her the info below. Pt states she called the [place] location this morning and they seem to be taking patients. I did tell her to call and verify before she went because we do not have it listed. Pt understood.

## 2020-05-08 ENCOUNTER — APPOINTMENT (OUTPATIENT)
Dept: LAB | Facility: HOSPITAL | Age: 60
End: 2020-05-08

## 2020-05-08 LAB
IRON 24H UR-MRATE: 93 MCG/DL (ref 37–145)
T4 FREE SERPL-MCNC: 1.49 NG/DL (ref 0.93–1.7)
TSH SERPL DL<=0.05 MIU/L-ACNC: 0.86 UIU/ML (ref 0.27–4.2)

## 2020-05-08 PROCEDURE — 86235 NUCLEAR ANTIGEN ANTIBODY: CPT

## 2020-05-08 PROCEDURE — 84439 ASSAY OF FREE THYROXINE: CPT | Performed by: INTERNAL MEDICINE

## 2020-05-08 PROCEDURE — 83540 ASSAY OF IRON: CPT | Performed by: INTERNAL MEDICINE

## 2020-05-08 PROCEDURE — 84443 ASSAY THYROID STIM HORMONE: CPT | Performed by: INTERNAL MEDICINE

## 2020-05-08 PROCEDURE — 86225 DNA ANTIBODY NATIVE: CPT | Performed by: INTERNAL MEDICINE

## 2020-05-08 PROCEDURE — 86038 ANTINUCLEAR ANTIBODIES: CPT | Performed by: INTERNAL MEDICINE

## 2020-05-09 LAB
ANA SER QL: POSITIVE
CENTROMERE B AB SER-ACNC: <0.2 AI (ref 0–0.9)
CHROMATIN AB SERPL-ACNC: 0.2 AI (ref 0–0.9)
DSDNA AB SER-ACNC: 1 IU/ML (ref 0–9)
ENA JO1 AB SER-ACNC: <0.2 AI (ref 0–0.9)
ENA RNP AB SER-ACNC: 1.4 AI (ref 0–0.9)
ENA SCL70 AB SER-ACNC: <0.2 AI (ref 0–0.9)
ENA SM AB SER-ACNC: 0.2 AI (ref 0–0.9)
ENA SS-A AB SER-ACNC: <0.2 AI (ref 0–0.9)
ENA SS-B AB SER-ACNC: <0.2 AI (ref 0–0.9)
Lab: ABNORMAL

## 2020-07-27 ENCOUNTER — TELEPHONE (OUTPATIENT)
Dept: ENDOCRINOLOGY | Facility: CLINIC | Age: 60
End: 2020-07-27

## 2020-07-27 DIAGNOSIS — E03.9 ACQUIRED HYPOTHYROIDISM: Primary | ICD-10-CM

## 2020-07-27 NOTE — TELEPHONE ENCOUNTER
Order for thyroid function testing created.  Thanks, [facility]

## 2020-07-27 NOTE — TELEPHONE ENCOUNTER
Pt would like to get blood work done, states she has lost 20 pounds. Pt would like return phone call.

## 2020-07-28 NOTE — TELEPHONE ENCOUNTER
Pt states she has intentionally lost 20 pounds and has been tired and is asking if chemistries can be added to labs so potassium etc can be checked

## 2020-07-29 ENCOUNTER — LAB (OUTPATIENT)
Dept: LAB | Facility: HOSPITAL | Age: 60
End: 2020-07-29

## 2020-07-29 LAB
ALBUMIN SERPL-MCNC: 4.6 G/DL (ref 3.5–5.2)
ALBUMIN/GLOB SERPL: 1.5 G/DL
ALP SERPL-CCNC: 75 U/L (ref 39–117)
ALT SERPL W P-5'-P-CCNC: 25 U/L (ref 1–33)
ANION GAP SERPL CALCULATED.3IONS-SCNC: 12.5 MMOL/L (ref 5–15)
AST SERPL-CCNC: 18 U/L (ref 1–32)
BILIRUB SERPL-MCNC: 0.3 MG/DL (ref 0–1.2)
BUN SERPL-MCNC: 23 MG/DL (ref 8–23)
BUN/CREAT SERPL: 33.3 (ref 7–25)
CALCIUM SPEC-SCNC: 9.7 MG/DL (ref 8.6–10.5)
CHLORIDE SERPL-SCNC: 105 MMOL/L (ref 98–107)
CO2 SERPL-SCNC: 24.5 MMOL/L (ref 22–29)
CREAT SERPL-MCNC: 0.69 MG/DL (ref 0.57–1)
GFR SERPL CREATININE-BSD FRML MDRD: 87 ML/MIN/1.73
GLOBULIN UR ELPH-MCNC: 3.1 GM/DL
GLUCOSE SERPL-MCNC: 104 MG/DL (ref 65–99)
POTASSIUM SERPL-SCNC: 4.5 MMOL/L (ref 3.5–5.2)
PROT SERPL-MCNC: 7.7 G/DL (ref 6–8.5)
SODIUM SERPL-SCNC: 142 MMOL/L (ref 136–145)
T4 FREE SERPL-MCNC: 1.31 NG/DL (ref 0.93–1.7)
TSH SERPL DL<=0.05 MIU/L-ACNC: 4.43 UIU/ML (ref 0.27–4.2)

## 2020-07-29 PROCEDURE — 84439 ASSAY OF FREE THYROXINE: CPT | Performed by: INTERNAL MEDICINE

## 2020-07-29 PROCEDURE — 80053 COMPREHEN METABOLIC PANEL: CPT | Performed by: INTERNAL MEDICINE

## 2020-07-29 PROCEDURE — 84443 ASSAY THYROID STIM HORMONE: CPT | Performed by: INTERNAL MEDICINE

## 2020-08-01 ENCOUNTER — TELEPHONE (OUTPATIENT)
Dept: ENDOCRINOLOGY | Facility: CLINIC | Age: 60
End: 2020-08-01

## 2020-08-01 RX ORDER — LEVOTHYROXINE SODIUM 112 UG/1
112 TABLET ORAL DAILY
Qty: 30 TABLET | Refills: 11 | Status: SHIPPED | OUTPATIENT
Start: 2020-08-01 | End: 2020-08-06 | Stop reason: SDUPTHER

## 2020-08-06 ENCOUNTER — TELEPHONE (OUTPATIENT)
Dept: ENDOCRINOLOGY | Facility: CLINIC | Age: 60
End: 2020-08-06

## 2020-08-06 RX ORDER — LEVOTHYROXINE SODIUM 112 MCG
112 TABLET ORAL DAILY
Qty: 30 TABLET | Refills: 11 | Status: SHIPPED | OUTPATIENT
Start: 2020-08-06 | End: 2021-03-04 | Stop reason: SDUPTHER

## 2020-08-06 RX ORDER — LEVOTHYROXINE SODIUM 112 UG/1
112 TABLET ORAL DAILY
Qty: 30 TABLET | Refills: 11 | Status: SHIPPED | OUTPATIENT
Start: 2020-08-06 | End: 2020-08-06 | Stop reason: SDUPTHER

## 2020-08-06 NOTE — TELEPHONE ENCOUNTER
Sent rx for Levothyroxine to [facility] pharmacy in [place].    Informed the patient of the above.    Patient states, she can't take the generic, Levothyroxine because last time she took this med, she has SOB.    Dr. Morelos, please advise.  Patient is requesting Synthroid and have this med sent to [facility] in [place].  Thank you.

## 2020-08-06 NOTE — TELEPHONE ENCOUNTER
PATIENT IS REQUESTING TO HAVE NEW RX FOR LEVOTHYROXINE SENT TO [facility] IN [place] INSTEAD OF [facility].

## 2020-11-04 ENCOUNTER — TELEPHONE (OUTPATIENT)
Dept: ENDOCRINOLOGY | Facility: CLINIC | Age: 60
End: 2020-11-04

## 2020-11-04 DIAGNOSIS — E03.9 ACQUIRED HYPOTHYROIDISM: Primary | ICD-10-CM

## 2020-11-04 NOTE — TELEPHONE ENCOUNTER
Patient is needing to have thyroid labs drawn before her next appointment. She is going to have them done at the [facility] in [place]. Please put orders in the patients chart.

## 2020-11-09 ENCOUNTER — LAB (OUTPATIENT)
Dept: LAB | Facility: HOSPITAL | Age: 60
End: 2020-11-09

## 2020-11-09 DIAGNOSIS — E03.9 ACQUIRED HYPOTHYROIDISM: ICD-10-CM

## 2020-11-09 LAB
T4 FREE SERPL-MCNC: 1.49 NG/DL (ref 0.93–1.7)
TSH SERPL DL<=0.05 MIU/L-ACNC: 0.71 UIU/ML (ref 0.27–4.2)

## 2020-11-09 PROCEDURE — 84443 ASSAY THYROID STIM HORMONE: CPT | Performed by: INTERNAL MEDICINE

## 2020-11-09 PROCEDURE — 84439 ASSAY OF FREE THYROXINE: CPT | Performed by: INTERNAL MEDICINE

## 2021-03-04 ENCOUNTER — LAB (OUTPATIENT)
Dept: LAB | Facility: HOSPITAL | Age: 61
End: 2021-03-04

## 2021-03-04 ENCOUNTER — OFFICE VISIT (OUTPATIENT)
Dept: ENDOCRINOLOGY | Facility: CLINIC | Age: 61
End: 2021-03-04

## 2021-03-04 VITALS
OXYGEN SATURATION: 99 % | DIASTOLIC BLOOD PRESSURE: 82 MMHG | BODY MASS INDEX: 34.55 KG/M2 | HEART RATE: 70 BPM | WEIGHT: 207.4 LBS | SYSTOLIC BLOOD PRESSURE: 132 MMHG | HEIGHT: 65 IN

## 2021-03-04 DIAGNOSIS — E55.9 VITAMIN D DEFICIENCY: ICD-10-CM

## 2021-03-04 DIAGNOSIS — R73.09 ELEVATED GLUCOSE: Primary | ICD-10-CM

## 2021-03-04 DIAGNOSIS — E03.9 ACQUIRED HYPOTHYROIDISM: ICD-10-CM

## 2021-03-04 DIAGNOSIS — E78.00 PURE HYPERCHOLESTEROLEMIA: ICD-10-CM

## 2021-03-04 LAB
25(OH)D3 SERPL-MCNC: 51.5 NG/ML
ALBUMIN SERPL-MCNC: 4.6 G/DL (ref 3.5–5.2)
ALBUMIN/GLOB SERPL: 1.6 G/DL
ALP SERPL-CCNC: 97 U/L (ref 39–117)
ALT SERPL W P-5'-P-CCNC: 24 U/L (ref 1–33)
ANION GAP SERPL CALCULATED.3IONS-SCNC: 9 MMOL/L (ref 5–15)
AST SERPL-CCNC: 16 U/L (ref 1–32)
BILIRUB SERPL-MCNC: 0.3 MG/DL (ref 0–1.2)
BUN SERPL-MCNC: 20 MG/DL (ref 8–23)
BUN/CREAT SERPL: 25.3 (ref 7–25)
CALCIUM SPEC-SCNC: 9.5 MG/DL (ref 8.6–10.5)
CHLORIDE SERPL-SCNC: 103 MMOL/L (ref 98–107)
CHOLEST SERPL-MCNC: 143 MG/DL (ref 0–200)
CO2 SERPL-SCNC: 27 MMOL/L (ref 22–29)
CREAT SERPL-MCNC: 0.79 MG/DL (ref 0.57–1)
EXPIRATION DATE: NORMAL
EXPIRATION DATE: NORMAL
GFR SERPL CREATININE-BSD FRML MDRD: 74 ML/MIN/1.73
GLOBULIN UR ELPH-MCNC: 2.9 GM/DL
GLUCOSE BLDC GLUCOMTR-MCNC: 118 MG/DL (ref 70–130)
GLUCOSE SERPL-MCNC: 96 MG/DL (ref 65–99)
HBA1C MFR BLD: 5.3 %
HDLC SERPL-MCNC: 43 MG/DL (ref 40–60)
LDLC SERPL CALC-MCNC: 82 MG/DL (ref 0–100)
LDLC/HDLC SERPL: 1.89 {RATIO}
Lab: NORMAL
Lab: NORMAL
POTASSIUM SERPL-SCNC: 4.2 MMOL/L (ref 3.5–5.2)
PROT SERPL-MCNC: 7.5 G/DL (ref 6–8.5)
SODIUM SERPL-SCNC: 139 MMOL/L (ref 136–145)
T4 FREE SERPL-MCNC: 1.4 NG/DL (ref 0.93–1.7)
TRIGL SERPL-MCNC: 93 MG/DL (ref 0–150)
TSH SERPL DL<=0.05 MIU/L-ACNC: 0.33 UIU/ML (ref 0.27–4.2)
VLDLC SERPL-MCNC: 18 MG/DL (ref 5–40)

## 2021-03-04 PROCEDURE — 84439 ASSAY OF FREE THYROXINE: CPT | Performed by: INTERNAL MEDICINE

## 2021-03-04 PROCEDURE — 99214 OFFICE O/P EST MOD 30 MIN: CPT | Performed by: INTERNAL MEDICINE

## 2021-03-04 PROCEDURE — 82947 ASSAY GLUCOSE BLOOD QUANT: CPT | Performed by: INTERNAL MEDICINE

## 2021-03-04 PROCEDURE — 84443 ASSAY THYROID STIM HORMONE: CPT | Performed by: INTERNAL MEDICINE

## 2021-03-04 PROCEDURE — 83036 HEMOGLOBIN GLYCOSYLATED A1C: CPT | Performed by: INTERNAL MEDICINE

## 2021-03-04 PROCEDURE — 80053 COMPREHEN METABOLIC PANEL: CPT | Performed by: INTERNAL MEDICINE

## 2021-03-04 PROCEDURE — 80061 LIPID PANEL: CPT | Performed by: INTERNAL MEDICINE

## 2021-03-04 PROCEDURE — 82306 VITAMIN D 25 HYDROXY: CPT | Performed by: INTERNAL MEDICINE

## 2021-03-04 RX ORDER — LEVOTHYROXINE SODIUM 112 MCG
112 TABLET ORAL DAILY
Qty: 30 TABLET | Refills: 11 | Status: SHIPPED | OUTPATIENT
Start: 2021-03-04 | End: 2022-03-07 | Stop reason: SDUPTHER

## 2021-03-04 NOTE — ASSESSMENT & PLAN NOTE
Blood sugar and 90 day average sugar are well controlled  Results for orders placed or performed in visit on 03/04/21   POC Glycosylated Hemoglobin (Hb A1C)    Specimen: Blood   Result Value Ref Range    Hemoglobin A1C 5.3 %    Lot Number [id number]     Expiration Date 11/24/2022    POC Glucose, Blood    Specimen: Blood   Result Value Ref Range    Glucose 118 70 - 130 mg/dL    Lot Number [id number]     Expiration Date 08/16/2021      Discussed with patient- update annually

## 2021-03-04 NOTE — PROGRESS NOTES
Ruthann Morales 60 y.o.  CC:Follow-up, Hypothyroidism, Hyperlipidemia, Vitamin D Deficiency, and Hyperglycemia      [place]: Follow-up, Hypothyroidism, Hyperlipidemia, Vitamin D Deficiency, and Hyperglycemia    Blood sugar and 90 day average sugar reviewed  Results for orders placed or performed in visit on 03/04/21   POC Glycosylated Hemoglobin (Hb A1C)    Specimen: Blood   Result Value Ref Range    Hemoglobin A1C 5.3 %    Lot Number [id number]     Expiration Date 11/24/2022    POC Glucose, Blood    Specimen: Blood   Result Value Ref Range    Glucose 118 70 - 130 mg/dL    Lot Number [id number]     Expiration Date 08/16/2021      Average sugar is good   Some hair thinning - was on diet   Also had covid 11/20   Starting to feel better  Is on vitamin D supplement  Is on synthroid 112 mcg daily   Blood sugar and 90 day average sugar are normal- reviewed above with patient     Allergies   Allergen Reactions   • Sulfa Antibiotics Rash       Current Outpatient Medications:   •  Cholecalciferol (VITAMIN D) 1000 UNITS tablet, Take 2 tablets by mouth Daily., Disp: , Rfl:   •  Synthroid 112 MCG tablet, Take 1 tablet by mouth Daily., Disp: 30 tablet, Rfl: 11  Patient Active Problem List    Diagnosis   • Elevated glucose [R73.09]   • Right foot pain [M79.671]   • Leg cramps [R25.2]   • Pure hypercholesterolemia [E78.00]   • Rash [R21]   • Chronic fatigue [R53.82]   • Abdominal pain [R10.9]   • Hypothyroidism [E03.9]   • Irreducible incisional hernia [K43.0]     Review of Systems   Constitutional: Negative for activity change, appetite change and unexpected weight change.   HENT: Negative for congestion and rhinorrhea.    Eyes: Negative for visual disturbance.   Respiratory: Negative for cough and shortness of breath.    Cardiovascular: Negative for palpitations and leg swelling.   Gastrointestinal: Negative for constipation, diarrhea and nausea.   Genitourinary: Negative for hematuria.   Musculoskeletal: Negative for  "arthralgias, back pain, gait problem, joint swelling and myalgias.   Skin: Negative for color change, rash and wound.   Allergic/Immunologic: Negative for environmental allergies, food allergies and immunocompromised state.   Neurological: Negative for dizziness, weakness and light-headedness.   Psychiatric/Behavioral: Negative for confusion, decreased concentration, dysphoric mood and sleep disturbance. The patient is not nervous/anxious.      Social History     Socioeconomic History   • Marital status:      Spouse name: Not on file   • Number of children: Not on file   • Years of education: Not on file   • Highest education level: Not on file   Tobacco Use   • Smoking status: Never Smoker   • Smokeless tobacco: Never Used   Substance and Sexual Activity   • Alcohol use: No     Comment: occasionally   • Drug use: No   • Sexual activity: Defer     Comment:      Family History   Problem Relation Age of Onset   • Cancer Mother    • Breast cancer Mother    • Cancer Father    • Stroke Other    • Diabetes Other    • Heart attack Other    • Breast cancer Maternal Aunt      /82   Pulse 70   Ht 165.1 cm (65\")   Wt 94.1 kg (207 lb 6.4 oz)   SpO2 99%   BMI 34.51 kg/m²   Physical Exam  Vitals signs and nursing note reviewed.   Constitutional:       Appearance: Normal appearance. She is well-developed.   HENT:      Head: Normocephalic and atraumatic.   Eyes:      General: Lids are normal.      Extraocular Movements: Extraocular movements intact.      Conjunctiva/sclera: Conjunctivae normal.      Pupils: Pupils are equal, round, and reactive to light.   Neck:      Musculoskeletal: Normal range of motion and neck supple.      Thyroid: No thyroid mass or thyromegaly.      Vascular: No carotid bruit.      Trachea: Trachea normal. No tracheal deviation.   Cardiovascular:      Rate and Rhythm: Normal rate and regular rhythm.      Heart sounds: Normal heart sounds. No murmur. No friction rub. No gallop.  "   Pulmonary:      Effort: Pulmonary effort is normal. No respiratory distress.      Breath sounds: Normal breath sounds. No wheezing.   Musculoskeletal: Normal range of motion.         General: No deformity.   Lymphadenopathy:      Cervical: No cervical adenopathy.   Skin:     General: Skin is warm and dry.      Findings: No erythema or rash.      Nails: There is no clubbing.     Neurological:      Mental Status: She is alert and oriented to person, place, and time.      Cranial Nerves: No cranial nerve deficit.      Deep Tendon Reflexes: Reflexes are normal and symmetric. Reflexes normal.   Psychiatric:         Speech: Speech normal.         Behavior: Behavior normal.         Thought Content: Thought content normal.         Judgment: Judgment normal.       Results for orders placed or performed in visit on 03/04/21   POC Glycosylated Hemoglobin (Hb A1C)    Specimen: Blood   Result Value Ref Range    Hemoglobin A1C 5.3 %    Lot Number [id number]     Expiration Date 11/24/2022    POC Glucose, Blood    Specimen: Blood   Result Value Ref Range    Glucose 118 70 - 130 mg/dL    Lot Number [id number]     Expiration Date 08/16/2021      Diagnoses and all orders for this visit:    1. Elevated glucose (Primary)  Assessment & Plan:  Blood sugar and 90 day average sugar are well controlled  Results for orders placed or performed in visit on 03/04/21   POC Glycosylated Hemoglobin (Hb A1C)    Specimen: Blood   Result Value Ref Range    Hemoglobin A1C 5.3 %    Lot Number [id number]     Expiration Date 11/24/2022    POC Glucose, Blood    Specimen: Blood   Result Value Ref Range    Glucose 118 70 - 130 mg/dL    Lot Number [id number]     Expiration Date 08/16/2021      Discussed with patient- update annually     Orders:  -     POC Glycosylated Hemoglobin (Hb A1C)  -     POC Glucose, Blood    2. Pure hypercholesterolemia  Assessment & Plan:  On low fat diet, vitamin D supplement  Update flp and vitamin D levels     Orders:  -      Comprehensive Metabolic Panel  -     Lipid Panel    3. Acquired hypothyroidism  Assessment & Plan:  On synthroid 112 mcg daily   Check tfts     Orders:  -     TSH  -     T4, Free    4. Vitamin D deficiency  -     Vitamin D 25 Hydroxy    Other orders  -     Synthroid 112 MCG tablet; Take 1 tablet by mouth Daily.  Dispense: 30 tablet; Refill: 11  Return in about 1 year (around 3/4/2022) for Recheck.    Kia Morelos MD  Signed Kia Morelos MD

## 2021-03-12 ENCOUNTER — HOSPITAL ENCOUNTER (OUTPATIENT)
Dept: MAMMOGRAPHY | Facility: HOSPITAL | Age: 61
Discharge: HOME | End: 2021-03-12
Admitting: OBSTETRICS & GYNECOLOGY

## 2021-03-12 DIAGNOSIS — Z12.31 SCREENING MAMMOGRAM, ENCOUNTER FOR: ICD-10-CM

## 2021-03-12 PROCEDURE — 77067 SCR MAMMO BI INCL CAD: CPT

## 2021-03-12 PROCEDURE — 77063 BREAST TOMOSYNTHESIS BI: CPT

## 2021-08-17 ENCOUNTER — TELEPHONE (OUTPATIENT)
Dept: ENDOCRINOLOGY | Facility: CLINIC | Age: 61
End: 2021-08-17

## 2021-08-17 DIAGNOSIS — E03.9 ACQUIRED HYPOTHYROIDISM: Primary | ICD-10-CM

## 2021-08-17 NOTE — TELEPHONE ENCOUNTER
PT HAS BEEN TIRED LATELY AND WANTED TO KNOW IF SHE COULD HAVE LABS DRAWN FOR HER THYROID    PTS NUMBER  [phone]

## 2021-08-19 ENCOUNTER — LAB (OUTPATIENT)
Dept: LAB | Facility: HOSPITAL | Age: 61
End: 2021-08-19

## 2021-08-19 LAB
T4 FREE SERPL-MCNC: 1.18 NG/DL (ref 0.93–1.7)
TSH SERPL DL<=0.05 MIU/L-ACNC: 0.78 UIU/ML (ref 0.27–4.2)

## 2021-08-19 PROCEDURE — 84439 ASSAY OF FREE THYROXINE: CPT | Performed by: INTERNAL MEDICINE

## 2021-08-19 PROCEDURE — 84443 ASSAY THYROID STIM HORMONE: CPT | Performed by: INTERNAL MEDICINE

## 2022-03-07 RX ORDER — LEVOTHYROXINE SODIUM 112 MCG
112 TABLET ORAL DAILY
Qty: 30 TABLET | Refills: 0 | Status: SHIPPED | OUTPATIENT
Start: 2022-03-07 | End: 2022-03-24 | Stop reason: SDUPTHER

## 2022-03-07 NOTE — TELEPHONE ENCOUNTER
Patient called requesting a refill on her Synthroid 112 MCG tablet. She needs this sent to [facility].     Last ov  3/4/21  Follow up scheduled 3/24/22

## 2022-03-14 ENCOUNTER — HOSPITAL ENCOUNTER (OUTPATIENT)
Dept: MAMMOGRAPHY | Facility: HOSPITAL | Age: 62
End: 2022-03-14

## 2022-03-15 ENCOUNTER — HOSPITAL ENCOUNTER (OUTPATIENT)
Dept: MAMMOGRAPHY | Facility: HOSPITAL | Age: 62
Discharge: HOME | End: 2022-03-15

## 2022-03-15 DIAGNOSIS — Z12.31 SCREENING MAMMOGRAM, ENCOUNTER FOR: ICD-10-CM

## 2022-03-15 PROCEDURE — 77067 SCR MAMMO BI INCL CAD: CPT

## 2022-03-15 PROCEDURE — 77065 DX MAMMO INCL CAD UNI: CPT

## 2022-03-15 PROCEDURE — 77063 BREAST TOMOSYNTHESIS BI: CPT

## 2022-03-15 PROCEDURE — G0279 TOMOSYNTHESIS, MAMMO: HCPCS

## 2022-03-24 ENCOUNTER — OFFICE VISIT (OUTPATIENT)
Dept: ENDOCRINOLOGY | Facility: CLINIC | Age: 62
End: 2022-03-24

## 2022-03-24 ENCOUNTER — LAB (OUTPATIENT)
Dept: LAB | Facility: HOSPITAL | Age: 62
End: 2022-03-24

## 2022-03-24 VITALS
OXYGEN SATURATION: 96 % | WEIGHT: 228 LBS | BODY MASS INDEX: 37.94 KG/M2 | HEART RATE: 76 BPM | DIASTOLIC BLOOD PRESSURE: 70 MMHG | SYSTOLIC BLOOD PRESSURE: 124 MMHG

## 2022-03-24 DIAGNOSIS — E78.00 PURE HYPERCHOLESTEROLEMIA: ICD-10-CM

## 2022-03-24 DIAGNOSIS — E03.9 ACQUIRED HYPOTHYROIDISM: ICD-10-CM

## 2022-03-24 DIAGNOSIS — E55.9 VITAMIN D DEFICIENCY: ICD-10-CM

## 2022-03-24 DIAGNOSIS — R73.09 ELEVATED GLUCOSE: Primary | ICD-10-CM

## 2022-03-24 LAB
ALBUMIN SERPL-MCNC: 4.7 G/DL (ref 3.5–5.2)
ALBUMIN/GLOB SERPL: 1.7 G/DL
ALP SERPL-CCNC: 110 U/L (ref 39–117)
ALT SERPL W P-5'-P-CCNC: 33 U/L (ref 1–33)
ANION GAP SERPL CALCULATED.3IONS-SCNC: 13.4 MMOL/L (ref 5–15)
AST SERPL-CCNC: 20 U/L (ref 1–32)
BILIRUB SERPL-MCNC: 0.3 MG/DL (ref 0–1.2)
BUN SERPL-MCNC: 20 MG/DL (ref 8–23)
BUN/CREAT SERPL: 27.8 (ref 7–25)
CALCIUM SPEC-SCNC: 9.6 MG/DL (ref 8.6–10.5)
CHLORIDE SERPL-SCNC: 106 MMOL/L (ref 98–107)
CHOLEST SERPL-MCNC: 143 MG/DL (ref 0–200)
CO2 SERPL-SCNC: 22.6 MMOL/L (ref 22–29)
CREAT SERPL-MCNC: 0.72 MG/DL (ref 0.57–1)
EGFRCR SERPLBLD CKD-EPI 2021: 94.7 ML/MIN/1.73
EXPIRATION DATE: NORMAL
EXPIRATION DATE: NORMAL
GLOBULIN UR ELPH-MCNC: 2.8 GM/DL
GLUCOSE BLDC GLUCOMTR-MCNC: 122 MG/DL (ref 70–130)
GLUCOSE SERPL-MCNC: 89 MG/DL (ref 65–99)
HBA1C MFR BLD: 5.3 %
HDLC SERPL-MCNC: 38 MG/DL (ref 40–60)
LDLC SERPL CALC-MCNC: 82 MG/DL (ref 0–100)
LDLC/HDLC SERPL: 2.09 {RATIO}
Lab: NORMAL
Lab: NORMAL
POTASSIUM SERPL-SCNC: 4 MMOL/L (ref 3.5–5.2)
PROT SERPL-MCNC: 7.5 G/DL (ref 6–8.5)
SODIUM SERPL-SCNC: 142 MMOL/L (ref 136–145)
TRIGL SERPL-MCNC: 128 MG/DL (ref 0–150)
VLDLC SERPL-MCNC: 23 MG/DL (ref 5–40)

## 2022-03-24 PROCEDURE — 84439 ASSAY OF FREE THYROXINE: CPT | Performed by: INTERNAL MEDICINE

## 2022-03-24 PROCEDURE — 99214 OFFICE O/P EST MOD 30 MIN: CPT | Performed by: INTERNAL MEDICINE

## 2022-03-24 PROCEDURE — 82947 ASSAY GLUCOSE BLOOD QUANT: CPT | Performed by: INTERNAL MEDICINE

## 2022-03-24 PROCEDURE — 82306 VITAMIN D 25 HYDROXY: CPT | Performed by: INTERNAL MEDICINE

## 2022-03-24 PROCEDURE — 83036 HEMOGLOBIN GLYCOSYLATED A1C: CPT | Performed by: INTERNAL MEDICINE

## 2022-03-24 PROCEDURE — 80053 COMPREHEN METABOLIC PANEL: CPT | Performed by: INTERNAL MEDICINE

## 2022-03-24 PROCEDURE — 80061 LIPID PANEL: CPT | Performed by: INTERNAL MEDICINE

## 2022-03-24 PROCEDURE — 84443 ASSAY THYROID STIM HORMONE: CPT | Performed by: INTERNAL MEDICINE

## 2022-03-24 RX ORDER — LEVOTHYROXINE SODIUM 112 MCG
112 TABLET ORAL DAILY
Qty: 30 TABLET | Refills: 5 | Status: SHIPPED | OUTPATIENT
Start: 2022-03-24 | End: 2022-03-28

## 2022-03-24 NOTE — ASSESSMENT & PLAN NOTE
With normal a1c today  Continued efforts at diet, weight loss discussed  Results for orders placed or performed in visit on 03/24/22   POC Glycosylated Hemoglobin (Hb A1C)    Specimen: Blood   Result Value Ref Range    Hemoglobin A1C 5.3 %    Lot Number [id number]     Expiration Date 11/15/2023    POC Glucose, Blood    Specimen: Blood   Result Value Ref Range    Glucose 122 70 - 130 mg/dL    Lot Number [id number]     Expiration Date 07/14/2022      F/u 6 months

## 2022-03-24 NOTE — PROGRESS NOTES
Ruthann Morales 61 y.o.  CC:Follow-up, Hyperglycemia, Hypothyroidism, Hyperlipidemia, and Vitamin D Deficiency      [place]: Follow-up, Hyperglycemia, Hypothyroidism, Hyperlipidemia, and Vitamin D Deficiency    Blood sugar and 90 day average sugar reviewed  Results for orders placed or performed in visit on 03/24/22   POC Glycosylated Hemoglobin (Hb A1C)    Specimen: Blood   Result Value Ref Range    Hemoglobin A1C 5.3 %    Lot Number [id number]     Expiration Date 11/15/2023    POC Glucose, Blood    Specimen: Blood   Result Value Ref Range    Glucose 122 70 - 130 mg/dL    Lot Number [id number]     Expiration Date 07/14/2022      Is taking vitamin D   Is taking synthroid 112 mcg daily   Reviewed average sugar which is normal currently     Allergies   Allergen Reactions   • Sulfa Antibiotics Rash       Current Outpatient Medications:   •  Synthroid 112 MCG tablet, Take 1 tablet by mouth Daily., Disp: 30 tablet, Rfl: 5  •  Cholecalciferol (VITAMIN D) 1000 UNITS tablet, Take 2 tablets by mouth Daily., Disp: , Rfl:   •  linaclotide (LINZESS) 145 MCG capsule capsule, Take 1 capsule by mouth Daily., Disp: 30 capsule, Rfl: 2  Patient Active Problem List    Diagnosis    • Elevated glucose [R73.09]    • Right foot pain [M79.671]    • Leg cramps [R25.2]    • Pure hypercholesterolemia [E78.00]    • Rash [R21]    • Chronic fatigue [R53.82]    • Abdominal pain [R10.9]    • Hypothyroidism [E03.9]    • Irreducible incisional hernia [K43.0]      Review of Systems   Constitutional: Positive for unexpected weight change. Negative for activity change and appetite change.   HENT: Negative for congestion and rhinorrhea.    Eyes: Negative for visual disturbance.   Respiratory: Negative for cough and shortness of breath.    Cardiovascular: Negative for palpitations and leg swelling.   Gastrointestinal: Negative for constipation, diarrhea and nausea.   Genitourinary: Negative for hematuria.   Musculoskeletal: Positive for arthralgias.  Negative for back pain, gait problem, joint swelling and myalgias.   Skin: Negative for color change, rash and wound.   Allergic/Immunologic: Negative for environmental allergies, food allergies and immunocompromised state.   Neurological: Negative for dizziness, weakness and light-headedness.   Psychiatric/Behavioral: Negative for confusion, decreased concentration, dysphoric mood and sleep disturbance. The patient is not nervous/anxious.      Social History     Socioeconomic History   • Marital status:    Tobacco Use   • Smoking status: Never Smoker   • Smokeless tobacco: Never Used   Substance and Sexual Activity   • Alcohol use: No     Comment: occasionally   • Drug use: No   • Sexual activity: Defer     Comment:      Family History   Problem Relation Age of Onset   • Cancer Mother    • Breast cancer Mother    • Cancer Father    • Stroke Other    • Diabetes Other    • Heart attack Other    • Breast cancer Maternal Aunt      /70   Pulse 76   Wt 103 kg (228 lb)   SpO2 96%   BMI 37.94 kg/m²   Physical Exam  Vitals and nursing note reviewed.   Constitutional:       Appearance: Normal appearance. She is well-developed.   HENT:      Head: Normocephalic and atraumatic.   Eyes:      General: Lids are normal.      Extraocular Movements: Extraocular movements intact.      Conjunctiva/sclera: Conjunctivae normal.      Pupils: Pupils are equal, round, and reactive to light.   Neck:      Thyroid: No thyroid mass or thyromegaly.      Vascular: No carotid bruit.      Trachea: Trachea normal. No tracheal deviation.   Cardiovascular:      Rate and Rhythm: Normal rate and regular rhythm.      Pulses: Normal pulses.      Heart sounds: Normal heart sounds. No murmur heard.    No friction rub. No gallop.   Pulmonary:      Effort: Pulmonary effort is normal. No respiratory distress.      Breath sounds: Normal breath sounds. No wheezing.   Musculoskeletal:         General: No deformity. Normal range of motion.       Cervical back: Normal range of motion and neck supple.   Lymphadenopathy:      Cervical: No cervical adenopathy.   Skin:     General: Skin is warm and dry.      Findings: No erythema or rash.      Nails: There is no clubbing.   Neurological:      General: No focal deficit present.      Mental Status: She is alert and oriented to person, place, and time.      Cranial Nerves: No cranial nerve deficit.      Deep Tendon Reflexes: Reflexes are normal and symmetric. Reflexes normal.   Psychiatric:         Mood and Affect: Mood normal.         Speech: Speech normal.         Behavior: Behavior normal.         Thought Content: Thought content normal.         Judgment: Judgment normal.       Results for orders placed or performed in visit on 03/24/22   POC Glycosylated Hemoglobin (Hb A1C)    Specimen: Blood   Result Value Ref Range    Hemoglobin A1C 5.3 %    Lot Number [id number]     Expiration Date 11/15/2023    POC Glucose, Blood    Specimen: Blood   Result Value Ref Range    Glucose 122 70 - 130 mg/dL    Lot Number [id number]     Expiration Date 07/14/2022      Diagnoses and all orders for this visit:    1. Elevated glucose (Primary)  Assessment & Plan:  With normal a1c today  Continued efforts at diet, weight loss discussed  Results for orders placed or performed in visit on 03/24/22   POC Glycosylated Hemoglobin (Hb A1C)    Specimen: Blood   Result Value Ref Range    Hemoglobin A1C 5.3 %    Lot Number [id number]     Expiration Date 11/15/2023    POC Glucose, Blood    Specimen: Blood   Result Value Ref Range    Glucose 122 70 - 130 mg/dL    Lot Number [id number]     Expiration Date 07/14/2022      F/u 6 months     Orders:  -     POC Glycosylated Hemoglobin (Hb A1C)  -     POC Glucose, Blood  -     Comprehensive Metabolic Panel    2. Acquired hypothyroidism  Assessment & Plan:  Taking synthroid 112 mcg daily   Check tfts     Orders:  -     TSH  -     T4, Free    3. Pure hypercholesterolemia  Assessment & Plan:  Eating  low fat diet   Check flp     Orders:  -     Lipid Panel    4. Vitamin D deficiency  -     Vitamin D 25 Hydroxy    Other orders  -     Synthroid 112 MCG tablet; Take 1 tablet by mouth Daily.  Dispense: 30 tablet; Refill: 5  Return in about 6 months (around 9/24/2022) for Recheck.    Kia Morelos MD  Signed Kia Morelos MD

## 2022-03-25 LAB
25(OH)D3 SERPL-MCNC: 41.5 NG/ML (ref 30–100)
T4 FREE SERPL-MCNC: 1.5 NG/DL (ref 0.93–1.7)
TSH SERPL DL<=0.05 MIU/L-ACNC: 0.14 UIU/ML (ref 0.27–4.2)

## 2022-03-28 RX ORDER — LEVOTHYROXINE SODIUM 100 MCG
100 TABLET ORAL DAILY
Qty: 90 TABLET | Refills: 3 | Status: SHIPPED | OUTPATIENT
Start: 2022-03-28 | End: 2022-11-10

## 2022-05-23 ENCOUNTER — TELEPHONE (OUTPATIENT)
Dept: ENDOCRINOLOGY | Facility: CLINIC | Age: 62
End: 2022-05-23

## 2022-05-23 DIAGNOSIS — E03.9 ACQUIRED HYPOTHYROIDISM: Primary | ICD-10-CM

## 2022-05-23 NOTE — TELEPHONE ENCOUNTER
Pt was advised the lab order has been created.  She will go into [facility] to have the labs done  This week

## 2022-05-23 NOTE — TELEPHONE ENCOUNTER
Patient called stated her most recent medication change is causing her to sweat and she still can't seem to lose a pound. She thinks her dosage needs to be changed again. Please advise.

## 2022-05-25 ENCOUNTER — LAB (OUTPATIENT)
Dept: LAB | Facility: HOSPITAL | Age: 62
End: 2022-05-25

## 2022-05-25 DIAGNOSIS — E03.9 ACQUIRED HYPOTHYROIDISM: ICD-10-CM

## 2022-05-25 LAB
T3FREE SERPL-MCNC: 2.98 PG/ML (ref 2–4.4)
T4 FREE SERPL-MCNC: 1.42 NG/DL (ref 0.93–1.7)
TSH SERPL DL<=0.05 MIU/L-ACNC: 0.57 UIU/ML (ref 0.27–4.2)

## 2022-05-25 PROCEDURE — 84443 ASSAY THYROID STIM HORMONE: CPT

## 2022-05-25 PROCEDURE — 36415 COLL VENOUS BLD VENIPUNCTURE: CPT

## 2022-05-25 PROCEDURE — 84439 ASSAY OF FREE THYROXINE: CPT

## 2022-05-25 PROCEDURE — 84481 FREE ASSAY (FT-3): CPT

## 2022-09-19 ENCOUNTER — HOSPITAL ENCOUNTER (OUTPATIENT)
Dept: MAMMOGRAPHY | Facility: HOSPITAL | Age: 62
Discharge: HOME | End: 2022-09-19
Admitting: NURSE PRACTITIONER

## 2022-09-19 DIAGNOSIS — R92.8 FOLLOW-UP EXAMINATION OF ABNORMAL MAMMOGRAM: ICD-10-CM

## 2022-09-19 PROCEDURE — 77065 DX MAMMO INCL CAD UNI: CPT

## 2022-09-19 PROCEDURE — G0279 TOMOSYNTHESIS, MAMMO: HCPCS

## 2022-11-08 ENCOUNTER — LAB (OUTPATIENT)
Dept: LAB | Facility: HOSPITAL | Age: 62
End: 2022-11-08

## 2022-11-08 ENCOUNTER — OFFICE VISIT (OUTPATIENT)
Dept: ENDOCRINOLOGY | Facility: CLINIC | Age: 62
End: 2022-11-08

## 2022-11-08 VITALS
HEIGHT: 65 IN | OXYGEN SATURATION: 100 % | WEIGHT: 236.2 LBS | SYSTOLIC BLOOD PRESSURE: 124 MMHG | DIASTOLIC BLOOD PRESSURE: 64 MMHG | BODY MASS INDEX: 39.35 KG/M2 | HEART RATE: 66 BPM

## 2022-11-08 DIAGNOSIS — E78.00 PURE HYPERCHOLESTEROLEMIA: ICD-10-CM

## 2022-11-08 DIAGNOSIS — R73.09 ELEVATED GLUCOSE: Primary | ICD-10-CM

## 2022-11-08 DIAGNOSIS — E03.9 ACQUIRED HYPOTHYROIDISM: ICD-10-CM

## 2022-11-08 DIAGNOSIS — E55.9 VITAMIN D DEFICIENCY: ICD-10-CM

## 2022-11-08 DIAGNOSIS — R73.09 ELEVATED GLUCOSE: ICD-10-CM

## 2022-11-08 PROBLEM — M25.561 ARTHRALGIA OF RIGHT KNEE: Status: ACTIVE | Noted: 2022-09-02

## 2022-11-08 LAB
EXPIRATION DATE: NORMAL
EXPIRATION DATE: NORMAL
GLUCOSE BLDC GLUCOMTR-MCNC: 120 MG/DL (ref 70–130)
HBA1C MFR BLD: 5.5 %
Lab: NORMAL
Lab: NORMAL

## 2022-11-08 PROCEDURE — 80053 COMPREHEN METABOLIC PANEL: CPT

## 2022-11-08 PROCEDURE — 80061 LIPID PANEL: CPT

## 2022-11-08 PROCEDURE — 83036 HEMOGLOBIN GLYCOSYLATED A1C: CPT | Performed by: INTERNAL MEDICINE

## 2022-11-08 PROCEDURE — 84439 ASSAY OF FREE THYROXINE: CPT

## 2022-11-08 PROCEDURE — 82947 ASSAY GLUCOSE BLOOD QUANT: CPT | Performed by: INTERNAL MEDICINE

## 2022-11-08 PROCEDURE — 84443 ASSAY THYROID STIM HORMONE: CPT

## 2022-11-08 PROCEDURE — 82306 VITAMIN D 25 HYDROXY: CPT

## 2022-11-08 PROCEDURE — 99214 OFFICE O/P EST MOD 30 MIN: CPT | Performed by: INTERNAL MEDICINE

## 2022-11-08 NOTE — PROGRESS NOTES
Ruthann Morales 61 y.o.  CC:Follow-up, Hyperglycemia, Hypothyroidism, Hyperlipidemia, and Vitamin D Deficiency    [place]: Follow-up, Hyperglycemia, Hypothyroidism, Hyperlipidemia, and Vitamin D Deficiency    Blood sugar and 90 day average sugar reviewed  Results for orders placed or performed in visit on 11/08/22   POC Glycosylated Hemoglobin (Hb A1C)    Specimen: Blood   Result Value Ref Range    Hemoglobin A1C 5.5 %    Lot Number [id number]     Expiration Date 07/04/2024    POC Glucose, Blood    Specimen: Blood   Result Value Ref Range    Glucose 120 70 - 130 mg/dL    Lot Number [id number]     Expiration Date 05/24/2023      Average sugar is good  bp is good- taking lisinopril  Is taking synthroid 100 mcg daily   Is taking vitamin D supplement 2000 u daily   Has had knee replacement 6 weeks ago  bp up and down     Allergies   Allergen Reactions   • Sulfa Antibiotics Rash       Current Outpatient Medications:   •  Cholecalciferol (VITAMIN D) 1000 UNITS tablet, Take 2 tablets by mouth Daily., Disp: , Rfl:   •  linaclotide (LINZESS) 145 MCG capsule capsule, Take 1 capsule by mouth Daily., Disp: 30 capsule, Rfl: 2  •  lisinopril (PRINIVIL,ZESTRIL) 5 MG tablet, Take 1 tablet by mouth Daily., Disp: 30 tablet, Rfl: 0  •  oxyCODONE (ROXICODONE) 5 MG immediate release tablet, Take 1-2 tablets by mouth every 4-6 hours, Disp: 35 tablet, Rfl: 0  •  Synthroid 100 MCG tablet, Take 1 tablet by mouth Daily. Replaces 112 mcg daily dose, Disp: 90 tablet, Rfl: 3  Patient Active Problem List    Diagnosis    • Arthralgia of right knee [M25.561]    • Elevated glucose [R73.09]    • Right foot pain [M79.671]    • Leg cramps [R25.2]    • Pure hypercholesterolemia [E78.00]    • Rash [R21]    • Chronic fatigue [R53.82]    • Abdominal pain [R10.9]    • Hypothyroidism [E03.9]    • Irreducible incisional hernia [K43.0]      Review of Systems   Constitutional: Negative for activity change, appetite change and unexpected weight change.   HENT:  "Negative for congestion and rhinorrhea.    Eyes: Negative for visual disturbance.   Respiratory: Negative for cough and shortness of breath.    Cardiovascular: Negative for palpitations and leg swelling.   Gastrointestinal: Negative for constipation, diarrhea and nausea.   Genitourinary: Negative for hematuria.   Musculoskeletal: Positive for arthralgias and gait problem. Negative for back pain, joint swelling and myalgias.   Skin: Negative for color change, rash and wound.   Allergic/Immunologic: Negative for environmental allergies, food allergies and immunocompromised state.   Neurological: Negative for dizziness, weakness and light-headedness.   Psychiatric/Behavioral: Negative for confusion, decreased concentration, dysphoric mood and sleep disturbance. The patient is not nervous/anxious.      Social History     Socioeconomic History   • Marital status:    Tobacco Use   • Smoking status: Never   • Smokeless tobacco: Never   Substance and Sexual Activity   • Alcohol use: No     Comment: occasionally   • Drug use: No   • Sexual activity: Defer     Comment:      Family History   Problem Relation Age of Onset   • Cancer Mother    • Breast cancer Mother    • Cancer Father    • Stroke Other    • Diabetes Other    • Heart attack Other    • Breast cancer Maternal Aunt      /64   Pulse 66   Ht 165.1 cm (65\")   Wt 107 kg (236 lb 3.2 oz)   SpO2 100%   BMI 39.31 kg/m²   Physical Exam  Vitals and nursing note reviewed.   Constitutional:       Appearance: Normal appearance. She is well-developed.   HENT:      Head: Normocephalic and atraumatic.   Eyes:      General: Lids are normal.      Extraocular Movements: Extraocular movements intact.      Conjunctiva/sclera: Conjunctivae normal.      Pupils: Pupils are equal, round, and reactive to light.   Neck:      Thyroid: No thyroid mass or thyromegaly.      Vascular: No carotid bruit.      Trachea: Trachea normal. No tracheal deviation.   Cardiovascular: "      Rate and Rhythm: Normal rate and regular rhythm.      Pulses: Normal pulses.      Heart sounds: Normal heart sounds. No murmur heard.    No friction rub. No gallop.   Pulmonary:      Effort: Pulmonary effort is normal. No respiratory distress.      Breath sounds: Normal breath sounds. No wheezing.   Musculoskeletal:         General: No deformity. Normal range of motion.      Cervical back: Normal range of motion and neck supple.   Lymphadenopathy:      Cervical: No cervical adenopathy.   Skin:     General: Skin is warm and dry.      Findings: No erythema or rash.      Nails: There is no clubbing.   Neurological:      General: No focal deficit present.      Mental Status: She is alert and oriented to person, place, and time.      Cranial Nerves: No cranial nerve deficit.      Deep Tendon Reflexes: Reflexes are normal and symmetric. Reflexes normal.   Psychiatric:         Mood and Affect: Mood normal.         Speech: Speech normal.         Behavior: Behavior normal.         Thought Content: Thought content normal.         Judgment: Judgment normal.       Results for orders placed or performed in visit on 11/08/22   POC Glycosylated Hemoglobin (Hb A1C)    Specimen: Blood   Result Value Ref Range    Hemoglobin A1C 5.5 %    Lot Number [id number]     Expiration Date 07/04/2024    POC Glucose, Blood    Specimen: Blood   Result Value Ref Range    Glucose 120 70 - 130 mg/dL    Lot Number [id number]     Expiration Date 05/24/2023      Diagnoses and all orders for this visit:    1. Elevated glucose (Primary)  Assessment & Plan:  Blood sugar and 90 day average sugar reviewed  Results for orders placed or performed in visit on 11/08/22   POC Glycosylated Hemoglobin (Hb A1C)    Specimen: Blood   Result Value Ref Range    Hemoglobin A1C 5.5 %    Lot Number [id number]     Expiration Date 07/04/2024    POC Glucose, Blood    Specimen: Blood   Result Value Ref Range    Glucose 120 70 - 130 mg/dL    Lot Number [id number]      Expiration Date 05/24/2023      Average sugar is good  Continue monitoring and medication     Orders:  -     POC Glycosylated Hemoglobin (Hb A1C)  -     POC Glucose, Blood  -     Comprehensive Metabolic Panel; Future    2. Pure hypercholesterolemia  Assessment & Plan:  Is on low fat diet   Check flp     Orders:  -     Lipid Panel; Future    3. Acquired hypothyroidism  Assessment & Plan:  Taking thyroid medication regularly  Check tfts     Orders:  -     TSH; Future  -     T4, Free; Future    4. Vitamin D deficiency  -     Vitamin D,25-Hydroxy; Future  Return in about 6 months (around 5/8/2023) for Recheck.    Kia Morelos MD  Signed Kia Morelos MD

## 2022-11-09 LAB
25(OH)D3 SERPL-MCNC: 41.5 NG/ML (ref 30–100)
ALBUMIN SERPL-MCNC: 4.2 G/DL (ref 3.5–5.2)
ALBUMIN/GLOB SERPL: 1.4 G/DL
ALP SERPL-CCNC: 105 U/L (ref 39–117)
ALT SERPL W P-5'-P-CCNC: 33 U/L (ref 1–33)
ANION GAP SERPL CALCULATED.3IONS-SCNC: 9.1 MMOL/L (ref 5–15)
AST SERPL-CCNC: 21 U/L (ref 1–32)
BILIRUB SERPL-MCNC: 0.3 MG/DL (ref 0–1.2)
BUN SERPL-MCNC: 19 MG/DL (ref 8–23)
BUN/CREAT SERPL: 21.3 (ref 7–25)
CALCIUM SPEC-SCNC: 9.4 MG/DL (ref 8.6–10.5)
CHLORIDE SERPL-SCNC: 105 MMOL/L (ref 98–107)
CHOLEST SERPL-MCNC: 168 MG/DL (ref 0–200)
CO2 SERPL-SCNC: 24.9 MMOL/L (ref 22–29)
CREAT SERPL-MCNC: 0.89 MG/DL (ref 0.57–1)
EGFRCR SERPLBLD CKD-EPI 2021: 73.4 ML/MIN/1.73
GLOBULIN UR ELPH-MCNC: 3 GM/DL
GLUCOSE SERPL-MCNC: 99 MG/DL (ref 65–99)
HDLC SERPL-MCNC: 49 MG/DL (ref 40–60)
LDLC SERPL CALC-MCNC: 101 MG/DL (ref 0–100)
LDLC/HDLC SERPL: 2.04 {RATIO}
POTASSIUM SERPL-SCNC: 4.5 MMOL/L (ref 3.5–5.2)
PROT SERPL-MCNC: 7.2 G/DL (ref 6–8.5)
SODIUM SERPL-SCNC: 139 MMOL/L (ref 136–145)
T4 FREE SERPL-MCNC: 1.31 NG/DL (ref 0.93–1.7)
TRIGL SERPL-MCNC: 96 MG/DL (ref 0–150)
TSH SERPL DL<=0.05 MIU/L-ACNC: 4.76 UIU/ML (ref 0.27–4.2)
VLDLC SERPL-MCNC: 18 MG/DL (ref 5–40)

## 2022-11-09 NOTE — ASSESSMENT & PLAN NOTE
Blood sugar and 90 day average sugar reviewed  Results for orders placed or performed in visit on 11/08/22   POC Glycosylated Hemoglobin (Hb A1C)    Specimen: Blood   Result Value Ref Range    Hemoglobin A1C 5.5 %    Lot Number [id number]     Expiration Date 07/04/2024    POC Glucose, Blood    Specimen: Blood   Result Value Ref Range    Glucose 120 70 - 130 mg/dL    Lot Number [id number]     Expiration Date 05/24/2023      Average sugar is good  Continue monitoring and medication

## 2022-11-10 RX ORDER — LEVOTHYROXINE SODIUM 112 MCG
112 TABLET ORAL DAILY
Qty: 90 TABLET | Refills: 3 | Status: SHIPPED | OUTPATIENT
Start: 2022-11-10 | End: 2023-11-10

## 2023-03-06 ENCOUNTER — HOSPITAL ENCOUNTER (OUTPATIENT)
Dept: MAMMOGRAPHY | Facility: HOSPITAL | Age: 63
Discharge: HOME | End: 2023-03-06
Admitting: NURSE PRACTITIONER
Payer: COMMERCIAL

## 2023-03-06 DIAGNOSIS — Z12.31 ENCOUNTER FOR SCREENING MAMMOGRAM FOR BREAST CANCER: ICD-10-CM

## 2023-03-06 DIAGNOSIS — R92.8 FOLLOW-UP EXAMINATION OF ABNORMAL MAMMOGRAM: ICD-10-CM

## 2023-03-06 PROCEDURE — 77066 DX MAMMO INCL CAD BI: CPT

## 2023-03-06 PROCEDURE — G0279 TOMOSYNTHESIS, MAMMO: HCPCS

## 2023-05-16 ENCOUNTER — OFFICE VISIT (OUTPATIENT)
Dept: ENDOCRINOLOGY | Facility: CLINIC | Age: 63
End: 2023-05-16
Payer: COMMERCIAL

## 2023-05-16 VITALS
OXYGEN SATURATION: 95 % | SYSTOLIC BLOOD PRESSURE: 148 MMHG | HEART RATE: 81 BPM | HEIGHT: 65 IN | WEIGHT: 238.6 LBS | DIASTOLIC BLOOD PRESSURE: 82 MMHG | BODY MASS INDEX: 39.75 KG/M2

## 2023-05-16 DIAGNOSIS — E55.9 VITAMIN D DEFICIENCY: ICD-10-CM

## 2023-05-16 DIAGNOSIS — I10 ESSENTIAL HYPERTENSION: ICD-10-CM

## 2023-05-16 DIAGNOSIS — R73.09 ELEVATED GLUCOSE: Primary | ICD-10-CM

## 2023-05-16 DIAGNOSIS — E78.00 PURE HYPERCHOLESTEROLEMIA: ICD-10-CM

## 2023-05-16 DIAGNOSIS — E03.9 ACQUIRED HYPOTHYROIDISM: ICD-10-CM

## 2023-05-16 LAB
BASOPHILS # BLD AUTO: 0.08 10*3/MM3 (ref 0–0.2)
BASOPHILS NFR BLD AUTO: 1.2 % (ref 0–1.5)
DEPRECATED RDW RBC AUTO: 41.2 FL (ref 37–54)
EOSINOPHIL # BLD AUTO: 0.28 10*3/MM3 (ref 0–0.4)
EOSINOPHIL NFR BLD AUTO: 4.3 % (ref 0.3–6.2)
ERYTHROCYTE [DISTWIDTH] IN BLOOD BY AUTOMATED COUNT: 12.5 % (ref 12.3–15.4)
EXPIRATION DATE: ABNORMAL
EXPIRATION DATE: NORMAL
GLUCOSE BLDC GLUCOMTR-MCNC: 174 MG/DL (ref 70–130)
HBA1C MFR BLD: 5.9 %
HCT VFR BLD AUTO: 40.5 % (ref 34–46.6)
HGB BLD-MCNC: 13.8 G/DL (ref 12–15.9)
IMM GRANULOCYTES # BLD AUTO: 0.03 10*3/MM3 (ref 0–0.05)
IMM GRANULOCYTES NFR BLD AUTO: 0.5 % (ref 0–0.5)
LYMPHOCYTES # BLD AUTO: 1.82 10*3/MM3 (ref 0.7–3.1)
LYMPHOCYTES NFR BLD AUTO: 28.3 % (ref 19.6–45.3)
Lab: ABNORMAL
Lab: NORMAL
MCH RBC QN AUTO: 30.8 PG (ref 26.6–33)
MCHC RBC AUTO-ENTMCNC: 34.1 G/DL (ref 31.5–35.7)
MCV RBC AUTO: 90.4 FL (ref 79–97)
MONOCYTES # BLD AUTO: 0.37 10*3/MM3 (ref 0.1–0.9)
MONOCYTES NFR BLD AUTO: 5.7 % (ref 5–12)
NEUTROPHILS NFR BLD AUTO: 3.86 10*3/MM3 (ref 1.7–7)
NEUTROPHILS NFR BLD AUTO: 60 % (ref 42.7–76)
NRBC BLD AUTO-RTO: 0 /100 WBC (ref 0–0.2)
PLATELET # BLD AUTO: 233 10*3/MM3 (ref 140–450)
PMV BLD AUTO: 12.6 FL (ref 6–12)
RBC # BLD AUTO: 4.48 10*6/MM3 (ref 3.77–5.28)
WBC NRBC COR # BLD: 6.44 10*3/MM3 (ref 3.4–10.8)

## 2023-05-16 PROCEDURE — 83036 HEMOGLOBIN GLYCOSYLATED A1C: CPT | Performed by: INTERNAL MEDICINE

## 2023-05-16 PROCEDURE — 80050 GENERAL HEALTH PANEL: CPT | Performed by: INTERNAL MEDICINE

## 2023-05-16 PROCEDURE — 99214 OFFICE O/P EST MOD 30 MIN: CPT | Performed by: INTERNAL MEDICINE

## 2023-05-16 PROCEDURE — 84439 ASSAY OF FREE THYROXINE: CPT | Performed by: INTERNAL MEDICINE

## 2023-05-16 PROCEDURE — 82306 VITAMIN D 25 HYDROXY: CPT | Performed by: INTERNAL MEDICINE

## 2023-05-16 PROCEDURE — 80061 LIPID PANEL: CPT | Performed by: INTERNAL MEDICINE

## 2023-05-16 PROCEDURE — 82947 ASSAY GLUCOSE BLOOD QUANT: CPT | Performed by: INTERNAL MEDICINE

## 2023-05-16 RX ORDER — LISINOPRIL 10 MG/1
TABLET ORAL
COMMUNITY
Start: 2023-05-12

## 2023-05-16 RX ORDER — LEVOTHYROXINE SODIUM 112 MCG
112 TABLET ORAL DAILY
Qty: 90 TABLET | Refills: 1 | Status: SHIPPED | OUTPATIENT
Start: 2023-05-16 | End: 2023-05-18

## 2023-05-16 NOTE — PROGRESS NOTES
Ruthann Morales 62 y.o.  CC:Follow-up, Hypothyroidism, Hyperlipidemia, Hypertension, Hyperglycemia, and Vitamin D Deficiency      [place]: Follow-up, Hypothyroidism, Hyperlipidemia, Hypertension, Hyperglycemia, and Vitamin D Deficiency    Having some higher bp   Is taking synthroid 112 mcg daily    stroke 2 months ago - more stress  Is on low fat diet  Blood sugar and 90 day average sugar reviewed  Results for orders placed or performed in visit on 05/16/23   POC Glycosylated Hemoglobin (Hb A1C)    Specimen: Blood   Result Value Ref Range    Hemoglobin A1C 5.9 %    Lot Number [id number]     Expiration Date 01/24/2025    POC Glucose, Blood    Specimen: Blood   Result Value Ref Range    Glucose 174 (A) 70 - 130 mg/dL    Lot Number [id number]     Expiration Date 11/18/2023      Average sugar is 120 - prediabetes reviewed  Also working with PCP re:bp   Discussed home monitoring and targets   Low salt diet, increase in activity discussed as well     Allergies   Allergen Reactions   • Sulfa Antibiotics Rash       Current Outpatient Medications:   •  Synthroid 112 MCG tablet, Take 1 tablet by mouth Daily., Disp: 90 tablet, Rfl: 1  •  Cholecalciferol (VITAMIN D) 1000 UNITS tablet, Take 2 tablets by mouth Daily., Disp: , Rfl:   •  linaclotide (LINZESS) 145 MCG capsule capsule, Take 1 capsule by mouth Daily. (Patient taking differently: Take 1 capsule by mouth Daily As Needed.), Disp: 30 capsule, Rfl: 2  •  lisinopril (PRINIVIL,ZESTRIL) 10 MG tablet, Take one po q day, Disp: , Rfl:   •  Synthroid 112 MCG tablet, Take 1 tablet by mouth Daily., Disp: 90 tablet, Rfl: 3  Patient Active Problem List    Diagnosis    • Essential hypertension [I10]    • Vitamin D deficiency [E55.9]    • Arthralgia of right knee [M25.561]    • Elevated glucose [R73.09]    • Right foot pain [M79.671]    • Leg cramps [R25.2]    • Pure hypercholesterolemia [E78.00]    • Rash [R21]    • Chronic fatigue [R53.82]    • Abdominal pain [R10.9]    •  "Hypothyroidism [E03.9]    • Irreducible incisional hernia [K43.0]      Review of Systems   Constitutional: Negative for activity change, appetite change and unexpected weight change.   HENT: Negative for congestion and rhinorrhea.    Eyes: Negative for visual disturbance.   Respiratory: Negative for cough and shortness of breath.    Cardiovascular: Negative for palpitations and leg swelling.   Gastrointestinal: Negative for constipation, diarrhea and nausea.   Genitourinary: Negative for hematuria.   Musculoskeletal: Negative for arthralgias, back pain, gait problem, joint swelling and myalgias.   Skin: Negative for color change, rash and wound.   Allergic/Immunologic: Negative for environmental allergies, food allergies and immunocompromised state.   Neurological: Negative for dizziness, weakness and light-headedness.   Psychiatric/Behavioral: Negative for confusion, decreased concentration, dysphoric mood and sleep disturbance. The patient is not nervous/anxious.      Social History     Socioeconomic History   • Marital status:    Tobacco Use   • Smoking status: Never   • Smokeless tobacco: Never   Substance and Sexual Activity   • Alcohol use: No     Comment: occasionally   • Drug use: No   • Sexual activity: Defer     Comment:      Family History   Problem Relation Age of Onset   • Cancer Mother    • Breast cancer Mother    • Cancer Father    • Stroke Other    • Diabetes Other    • Heart attack Other    • Breast cancer Maternal Aunt      /82   Pulse 81   Ht 165.1 cm (65\")   Wt 108 kg (238 lb 9.6 oz)   SpO2 95%   BMI 39.71 kg/m²   Physical Exam  Vitals and nursing note reviewed.   Constitutional:       Appearance: Normal appearance. She is well-developed.   HENT:      Head: Normocephalic and atraumatic.   Eyes:      General: Lids are normal.      Extraocular Movements: Extraocular movements intact.      Conjunctiva/sclera: Conjunctivae normal.      Pupils: Pupils are equal, round, and " reactive to light.   Neck:      Thyroid: No thyroid mass or thyromegaly.      Vascular: No carotid bruit.      Trachea: Trachea normal. No tracheal deviation.   Cardiovascular:      Rate and Rhythm: Normal rate and regular rhythm.      Pulses: Normal pulses.      Heart sounds: Normal heart sounds. No murmur heard.    No friction rub. No gallop.   Pulmonary:      Effort: Pulmonary effort is normal. No respiratory distress.      Breath sounds: Normal breath sounds. No wheezing.   Musculoskeletal:         General: No deformity. Normal range of motion.      Cervical back: Normal range of motion and neck supple.   Lymphadenopathy:      Cervical: No cervical adenopathy.   Skin:     General: Skin is warm and dry.      Findings: No erythema or rash.      Nails: There is no clubbing.   Neurological:      General: No focal deficit present.      Mental Status: She is alert and oriented to person, place, and time.      Cranial Nerves: No cranial nerve deficit.      Deep Tendon Reflexes: Reflexes are normal and symmetric. Reflexes normal.   Psychiatric:         Mood and Affect: Mood normal.         Speech: Speech normal.         Behavior: Behavior normal.         Thought Content: Thought content normal.         Judgment: Judgment normal.       Results for orders placed or performed in visit on 05/16/23   POC Glycosylated Hemoglobin (Hb A1C)    Specimen: Blood   Result Value Ref Range    Hemoglobin A1C 5.9 %    Lot Number [id number]     Expiration Date 01/24/2025    POC Glucose, Blood    Specimen: Blood   Result Value Ref Range    Glucose 174 (A) 70 - 130 mg/dL    Lot Number [id number]     Expiration Date 11/18/2023      Diagnoses and all orders for this visit:    1. Elevated glucose (Primary)  Assessment & Plan:  Average sugar pre diabetic  Diet, activity discussed  Results for orders placed or performed in visit on 05/16/23   POC Glycosylated Hemoglobin (Hb A1C)    Specimen: Blood   Result Value Ref Range    Hemoglobin A1C 5.9 %     Lot Number [id number]     Expiration Date 01/24/2025    POC Glucose, Blood    Specimen: Blood   Result Value Ref Range    Glucose 174 (A) 70 - 130 mg/dL    Lot Number [id number]     Expiration Date 11/18/2023          Orders:  -     POC Glycosylated Hemoglobin (Hb A1C)  -     POC Glucose, Blood    2. Acquired hypothyroidism  Assessment & Plan:  Update tfts   Taking synthroid 112 mcg daily     Orders:  -     TSH; Future  -     T4, Free; Future  -     TSH  -     T4, Free    3. Pure hypercholesterolemia  Assessment & Plan:  Low fat diet  Check flp     Orders:  -     Lipid Panel; Future  -     Lipid Panel    4. Essential hypertension  Assessment & Plan:  Taking lisinopril 10 mg daily   Check bp at home, increase if over 135/85 at home (to 20 mg)    Orders:  -     Comprehensive Metabolic Panel; Future  -     CBC Auto Differential; Future  -     Comprehensive Metabolic Panel  -     CBC Auto Differential    5. Vitamin D deficiency  Assessment & Plan:  Continue supplement  Check levels     Orders:  -     Vitamin D,25-Hydroxy; Future  -     Vitamin D,25-Hydroxy    Other orders  -     Synthroid 112 MCG tablet; Take 1 tablet by mouth Daily.  Dispense: 90 tablet; Refill: 1  Return in about 6 months (around 11/16/2023).    Kia Morelos MD  Signed Kia Morelos MD

## 2023-05-16 NOTE — ASSESSMENT & PLAN NOTE
Average sugar pre diabetic  Diet, activity discussed  Results for orders placed or performed in visit on 05/16/23   POC Glycosylated Hemoglobin (Hb A1C)    Specimen: Blood   Result Value Ref Range    Hemoglobin A1C 5.9 %    Lot Number [id number]     Expiration Date 01/24/2025    POC Glucose, Blood    Specimen: Blood   Result Value Ref Range    Glucose 174 (A) 70 - 130 mg/dL    Lot Number [id number]     Expiration Date 11/18/2023

## 2023-05-17 LAB
25(OH)D3 SERPL-MCNC: 45 NG/ML (ref 30–100)
ALBUMIN SERPL-MCNC: 4.2 G/DL (ref 3.5–5.2)
ALBUMIN/GLOB SERPL: 1.5 G/DL
ALP SERPL-CCNC: 92 U/L (ref 39–117)
ALT SERPL W P-5'-P-CCNC: 30 U/L (ref 1–33)
ANION GAP SERPL CALCULATED.3IONS-SCNC: 11.7 MMOL/L (ref 5–15)
AST SERPL-CCNC: 20 U/L (ref 1–32)
BILIRUB SERPL-MCNC: 0.2 MG/DL (ref 0–1.2)
BUN SERPL-MCNC: 17 MG/DL (ref 8–23)
BUN/CREAT SERPL: 21.8 (ref 7–25)
CALCIUM SPEC-SCNC: 9.6 MG/DL (ref 8.6–10.5)
CHLORIDE SERPL-SCNC: 108 MMOL/L (ref 98–107)
CHOLEST SERPL-MCNC: 129 MG/DL (ref 0–200)
CO2 SERPL-SCNC: 23.3 MMOL/L (ref 22–29)
CREAT SERPL-MCNC: 0.78 MG/DL (ref 0.57–1)
EGFRCR SERPLBLD CKD-EPI 2021: 85.5 ML/MIN/1.73
GLOBULIN UR ELPH-MCNC: 2.8 GM/DL
GLUCOSE SERPL-MCNC: 129 MG/DL (ref 65–99)
HDLC SERPL-MCNC: 32 MG/DL (ref 40–60)
LDLC SERPL CALC-MCNC: 57 MG/DL (ref 0–100)
LDLC/HDLC SERPL: 1.48 {RATIO}
POTASSIUM SERPL-SCNC: 4.2 MMOL/L (ref 3.5–5.2)
PROT SERPL-MCNC: 7 G/DL (ref 6–8.5)
SODIUM SERPL-SCNC: 143 MMOL/L (ref 136–145)
T4 FREE SERPL-MCNC: 1.29 NG/DL (ref 0.93–1.7)
TRIGL SERPL-MCNC: 248 MG/DL (ref 0–150)
TSH SERPL DL<=0.05 MIU/L-ACNC: 0.26 UIU/ML (ref 0.27–4.2)
VLDLC SERPL-MCNC: 40 MG/DL (ref 5–40)

## 2023-05-18 ENCOUNTER — TELEPHONE (OUTPATIENT)
Dept: ENDOCRINOLOGY | Facility: CLINIC | Age: 63
End: 2023-05-18
Payer: COMMERCIAL

## 2023-05-18 DIAGNOSIS — E03.9 ACQUIRED HYPOTHYROIDISM: Primary | ICD-10-CM

## 2023-05-18 RX ORDER — LEVOTHYROXINE SODIUM 100 MCG
100 TABLET ORAL DAILY
Qty: 30 TABLET | Refills: 11 | Status: SHIPPED | OUTPATIENT
Start: 2023-05-18 | End: 2024-05-17

## 2023-11-21 ENCOUNTER — OFFICE VISIT (OUTPATIENT)
Dept: ENDOCRINOLOGY | Facility: CLINIC | Age: 63
End: 2023-11-21
Payer: COMMERCIAL

## 2023-11-21 VITALS
DIASTOLIC BLOOD PRESSURE: 90 MMHG | WEIGHT: 242.8 LBS | SYSTOLIC BLOOD PRESSURE: 164 MMHG | BODY MASS INDEX: 40.45 KG/M2 | OXYGEN SATURATION: 99 % | HEART RATE: 90 BPM | HEIGHT: 65 IN

## 2023-11-21 DIAGNOSIS — R73.09 ELEVATED GLUCOSE: ICD-10-CM

## 2023-11-21 DIAGNOSIS — I10 ESSENTIAL HYPERTENSION: ICD-10-CM

## 2023-11-21 DIAGNOSIS — E03.9 ACQUIRED HYPOTHYROIDISM: Primary | ICD-10-CM

## 2023-11-21 PROCEDURE — 99214 OFFICE O/P EST MOD 30 MIN: CPT | Performed by: INTERNAL MEDICINE

## 2023-11-21 RX ORDER — LEVOTHYROXINE SODIUM 100 MCG
100 TABLET ORAL DAILY
Qty: 90 TABLET | Refills: 3 | Status: SHIPPED | OUTPATIENT
Start: 2023-11-21 | End: 2024-11-20

## 2023-11-21 RX ORDER — LOSARTAN POTASSIUM 25 MG/1
25 TABLET ORAL DAILY
COMMUNITY
Start: 2023-11-19 | End: 2023-11-21 | Stop reason: SDUPTHER

## 2023-11-21 RX ORDER — LOSARTAN POTASSIUM 100 MG/1
100 TABLET ORAL DAILY
Qty: 90 TABLET | Refills: 1 | Status: SHIPPED | OUTPATIENT
Start: 2023-11-21

## 2023-11-21 NOTE — ASSESSMENT & PLAN NOTE
High bp - change losartan to 100 mg daily   Continue home monitoring and notify us if over 145/85

## 2023-11-21 NOTE — PROGRESS NOTES
Ruthann Morales 62 y.o.  CC:Follow-up, Hypothyroidism, Hyperlipidemia, Hypertension, and Vitamin D Deficiency    [place]: Follow-up, Hypothyroidism, Hyperlipidemia, Hypertension, and Vitamin D Deficiency    Bp is high- took all medication at 7 am   Does check bp at home - 142/80  Is taking synthroid 100 mcg daily   Is taking vitamin D supplement   Miserable due to weight gain- knee pain   Had to  insurance that is terrible   Not eligible for knee injections   Would benefit from weight loss  Medication is not covered by insurer  She is considering weight loss clinic and we discussed  Hungry all the time   Cannot get blood tests- insurance does not cover- she will take order and price at lab     Allergies   Allergen Reactions    Sulfa Antibiotics Rash       Current Outpatient Medications:     losartan (COZAAR) 100 MG tablet, Take 1 tablet by mouth Daily., Disp: 90 tablet, Rfl: 1    Synthroid 100 MCG tablet, Take 1 tablet by mouth Daily., Disp: 90 tablet, Rfl: 3    Cholecalciferol (VITAMIN D) 1000 UNITS tablet, Take 2 tablets by mouth Daily., Disp: , Rfl:   Patient Active Problem List    Diagnosis     Essential hypertension [I10]     Vitamin D deficiency [E55.9]     Arthralgia of right knee [M25.561]     Elevated glucose [R73.09]     Right foot pain [M79.671]     Leg cramps [R25.2]     Pure hypercholesterolemia [E78.00]     Rash [R21]     Chronic fatigue [R53.82]     Abdominal pain [R10.9]     Hypothyroidism [E03.9]     Irreducible incisional hernia [K43.0]      Review of Systems   Constitutional:  Positive for activity change, appetite change and unexpected weight change.   HENT:  Negative for congestion and rhinorrhea.    Eyes:  Negative for visual disturbance.   Respiratory:  Negative for cough and shortness of breath.    Cardiovascular:  Positive for leg swelling. Negative for palpitations.   Gastrointestinal:  Negative for constipation, diarrhea and nausea.   Genitourinary:  Negative for hematuria.  "  Musculoskeletal:  Positive for arthralgias and gait problem. Negative for back pain, joint swelling and myalgias.   Skin:  Negative for color change, rash and wound.   Allergic/Immunologic: Negative for environmental allergies, food allergies and immunocompromised state.   Neurological:  Negative for dizziness, weakness and light-headedness.   Psychiatric/Behavioral:  Negative for confusion, decreased concentration, dysphoric mood and sleep disturbance. The patient is not nervous/anxious.      Social History     Socioeconomic History    Marital status:    Tobacco Use    Smoking status: Never    Smokeless tobacco: Never   Substance and Sexual Activity    Alcohol use: No     Comment: occasionally    Drug use: No    Sexual activity: Defer     Comment:      Family History   Problem Relation Age of Onset    Cancer Mother     Breast cancer Mother     Cancer Father     Stroke Other     Diabetes Other     Heart attack Other     Breast cancer Maternal Aunt      /90   Pulse 90   Ht 165.1 cm (65\")   Wt 110 kg (242 lb 12.8 oz)   SpO2 99%   BMI 40.40 kg/m²   Physical Exam  Vitals and nursing note reviewed.   Constitutional:       Appearance: Normal appearance. She is well-developed.   HENT:      Head: Normocephalic and atraumatic.   Eyes:      General: Lids are normal.      Extraocular Movements: Extraocular movements intact.      Conjunctiva/sclera: Conjunctivae normal.      Pupils: Pupils are equal, round, and reactive to light.   Neck:      Thyroid: No thyroid mass or thyromegaly.      Vascular: No carotid bruit.      Trachea: Trachea normal. No tracheal deviation.   Cardiovascular:      Rate and Rhythm: Normal rate and regular rhythm.      Pulses: Normal pulses.      Heart sounds: Normal heart sounds. No murmur heard.     No friction rub. No gallop.   Pulmonary:      Effort: Pulmonary effort is normal. No respiratory distress.      Breath sounds: Normal breath sounds. No wheezing. "   Musculoskeletal:         General: No deformity. Normal range of motion.      Cervical back: Normal range of motion and neck supple.   Lymphadenopathy:      Cervical: No cervical adenopathy.   Skin:     General: Skin is warm and dry.      Findings: No erythema or rash.      Nails: There is no clubbing.   Neurological:      General: No focal deficit present.      Mental Status: She is alert and oriented to person, place, and time.      Cranial Nerves: No cranial nerve deficit.      Deep Tendon Reflexes: Reflexes are normal and symmetric. Reflexes normal.   Psychiatric:         Mood and Affect: Mood normal.         Speech: Speech normal.         Behavior: Behavior normal.         Thought Content: Thought content normal.         Judgment: Judgment normal.       Results for orders placed or performed in visit on 05/16/23   Comprehensive Metabolic Panel    Specimen: Arm, Left; Blood   Result Value Ref Range    Glucose 129 (H) 65 - 99 mg/dL    BUN 17 8 - 23 mg/dL    Creatinine 0.78 0.57 - 1.00 mg/dL    Sodium 143 136 - 145 mmol/L    Potassium 4.2 3.5 - 5.2 mmol/L    Chloride 108 (H) 98 - 107 mmol/L    CO2 23.3 22.0 - 29.0 mmol/L    Calcium 9.6 8.6 - 10.5 mg/dL    Total Protein 7.0 6.0 - 8.5 g/dL    Albumin 4.2 3.5 - 5.2 g/dL    ALT (SGPT) 30 1 - 33 U/L    AST (SGOT) 20 1 - 32 U/L    Alkaline Phosphatase 92 39 - 117 U/L    Total Bilirubin 0.2 0.0 - 1.2 mg/dL    Globulin 2.8 gm/dL    A/G Ratio 1.5 g/dL    BUN/Creatinine Ratio 21.8 7.0 - 25.0    Anion Gap 11.7 5.0 - 15.0 mmol/L    eGFR 85.5 >60.0 mL/min/1.73   CBC Auto Differential    Specimen: Arm, Left; Blood   Result Value Ref Range    WBC 6.44 3.40 - 10.80 10*3/mm3    RBC 4.48 3.77 - 5.28 10*6/mm3    Hemoglobin 13.8 12.0 - 15.9 g/dL    Hematocrit 40.5 34.0 - 46.6 %    MCV 90.4 79.0 - 97.0 fL    MCH 30.8 26.6 - 33.0 pg    MCHC 34.1 31.5 - 35.7 g/dL    RDW 12.5 12.3 - 15.4 %    RDW-SD 41.2 37.0 - 54.0 fl    MPV 12.6 (H) 6.0 - 12.0 fL    Platelets 233 140 - 450 10*3/mm3     Neutrophil % 60.0 42.7 - 76.0 %    Lymphocyte % 28.3 19.6 - 45.3 %    Monocyte % 5.7 5.0 - 12.0 %    Eosinophil % 4.3 0.3 - 6.2 %    Basophil % 1.2 0.0 - 1.5 %    Immature Grans % 0.5 0.0 - 0.5 %    Neutrophils, Absolute 3.86 1.70 - 7.00 10*3/mm3    Lymphocytes, Absolute 1.82 0.70 - 3.10 10*3/mm3    Monocytes, Absolute 0.37 0.10 - 0.90 10*3/mm3    Eosinophils, Absolute 0.28 0.00 - 0.40 10*3/mm3    Basophils, Absolute 0.08 0.00 - 0.20 10*3/mm3    Immature Grans, Absolute 0.03 0.00 - 0.05 10*3/mm3    nRBC 0.0 0.0 - 0.2 /100 WBC   Lipid Panel    Specimen: Arm, Left; Blood   Result Value Ref Range    Total Cholesterol 129 0 - 200 mg/dL    Triglycerides 248 (H) 0 - 150 mg/dL    HDL Cholesterol 32 (L) 40 - 60 mg/dL    LDL Cholesterol  57 0 - 100 mg/dL    VLDL Cholesterol 40 5 - 40 mg/dL    LDL/HDL Ratio 1.48    TSH    Specimen: Arm, Left; Blood   Result Value Ref Range    TSH 0.263 (L) 0.270 - 4.200 uIU/mL   T4, Free    Specimen: Arm, Left; Blood   Result Value Ref Range    Free T4 1.29 0.93 - 1.70 ng/dL   Vitamin D,25-Hydroxy    Specimen: Arm, Left; Blood   Result Value Ref Range    25 Hydroxy, Vitamin D 45.0 30.0 - 100.0 ng/ml   POC Glycosylated Hemoglobin (Hb A1C)    Specimen: Blood   Result Value Ref Range    Hemoglobin A1C 5.9 %    Lot Number [id number]     Expiration Date 01/24/2025    POC Glucose, Blood    Specimen: Blood   Result Value Ref Range    Glucose 174 (A) 70 - 130 mg/dL    Lot Number [id number]     Expiration Date 11/18/2023      Diagnoses and all orders for this visit:    1. Acquired hypothyroidism (Primary)  Assessment & Plan:  Taking synthroid 100 mcg daily   Check tfts     Orders:  -     Lipid Panel; Future  -     TSH; Future  -     T4, Free; Future    2. Essential hypertension  Assessment & Plan:  High bp - change losartan to 100 mg daily   Continue home monitoring and notify us if over 145/85     Orders:  -     Comprehensive Metabolic Panel; Future  -     CBC Auto Differential; Future    3. Elevated  glucose  Assessment & Plan:  Update A1c  Order provided     Orders:  -     Hemoglobin A1c; Future    Other orders  -     Synthroid 100 MCG tablet; Take 1 tablet by mouth Daily.  Dispense: 90 tablet; Refill: 3  -     losartan (COZAAR) 100 MG tablet; Take 1 tablet by mouth Daily.  Dispense: 90 tablet; Refill: 1    Return in about 6 months (around 5/21/2024) for Recheck.    Kia Morelos MD  Signed iKa Morelos MD

## 2023-11-22 LAB
ALBUMIN SERPL-MCNC: 4.3 G/DL (ref 3.9–4.9)
ALBUMIN/GLOB SERPL: 1.5 {RATIO} (ref 1.2–2.2)
ALP SERPL-CCNC: 106 IU/L (ref 44–121)
ALT SERPL-CCNC: 35 IU/L (ref 0–32)
AMBIG ABBREV CMP14 DEFAULT: NORMAL
AMBIG ABBREV LP DEFAULT: NORMAL
AST SERPL-CCNC: 22 IU/L (ref 0–40)
BASOPHILS # BLD AUTO: 0.1 X10E3/UL (ref 0–0.2)
BASOPHILS NFR BLD AUTO: 1 %
BILIRUB SERPL-MCNC: 0.3 MG/DL (ref 0–1.2)
BUN SERPL-MCNC: 18 MG/DL (ref 8–27)
BUN/CREAT SERPL: 23 (ref 12–28)
CALCIUM SERPL-MCNC: 9 MG/DL (ref 8.7–10.3)
CHLORIDE SERPL-SCNC: 106 MMOL/L (ref 96–106)
CHOLEST SERPL-MCNC: 144 MG/DL (ref 100–199)
CO2 SERPL-SCNC: 20 MMOL/L (ref 20–29)
CREAT SERPL-MCNC: 0.8 MG/DL (ref 0.57–1)
EGFRCR SERPLBLD CKD-EPI 2021: 83 ML/MIN/1.73
EOSINOPHIL # BLD AUTO: 0.3 X10E3/UL (ref 0–0.4)
EOSINOPHIL NFR BLD AUTO: 4 %
ERYTHROCYTE [DISTWIDTH] IN BLOOD BY AUTOMATED COUNT: 12.8 % (ref 11.7–15.4)
GLOBULIN SER CALC-MCNC: 2.9 G/DL (ref 1.5–4.5)
GLUCOSE SERPL-MCNC: 116 MG/DL (ref 70–99)
HBA1C MFR BLD: 6 % (ref 4.8–5.6)
HCT VFR BLD AUTO: 41.8 % (ref 34–46.6)
HDLC SERPL-MCNC: 35 MG/DL
HGB BLD-MCNC: 13.7 G/DL (ref 11.1–15.9)
IMM GRANULOCYTES # BLD AUTO: 0 X10E3/UL (ref 0–0.1)
IMM GRANULOCYTES NFR BLD AUTO: 0 %
LDLC SERPL CALC-MCNC: 79 MG/DL (ref 0–99)
LYMPHOCYTES # BLD AUTO: 2.4 X10E3/UL (ref 0.7–3.1)
LYMPHOCYTES NFR BLD AUTO: 31 %
MCH RBC QN AUTO: 30.4 PG (ref 26.6–33)
MCHC RBC AUTO-ENTMCNC: 32.8 G/DL (ref 31.5–35.7)
MCV RBC AUTO: 93 FL (ref 79–97)
MONOCYTES # BLD AUTO: 0.4 X10E3/UL (ref 0.1–0.9)
MONOCYTES NFR BLD AUTO: 5 %
NEUTROPHILS # BLD AUTO: 4.5 X10E3/UL (ref 1.4–7)
NEUTROPHILS NFR BLD AUTO: 59 %
PLATELET # BLD AUTO: 231 X10E3/UL (ref 150–450)
POTASSIUM SERPL-SCNC: 4.1 MMOL/L (ref 3.5–5.2)
PROT SERPL-MCNC: 7.2 G/DL (ref 6–8.5)
RBC # BLD AUTO: 4.5 X10E6/UL (ref 3.77–5.28)
SODIUM SERPL-SCNC: 141 MMOL/L (ref 134–144)
T4 FREE SERPL-MCNC: 1.26 NG/DL (ref 0.82–1.77)
TRIGL SERPL-MCNC: 174 MG/DL (ref 0–149)
TSH SERPL DL<=0.005 MIU/L-ACNC: 0.95 UIU/ML (ref 0.45–4.5)
VLDLC SERPL CALC-MCNC: 30 MG/DL (ref 5–40)
WBC # BLD AUTO: 7.7 X10E3/UL (ref 3.4–10.8)

## 2024-03-11 ENCOUNTER — HOSPITAL ENCOUNTER (OUTPATIENT)
Dept: MAMMOGRAPHY | Facility: HOSPITAL | Age: 64
Discharge: HOME | End: 2024-03-11
Admitting: RADIOLOGY
Payer: COMMERCIAL

## 2024-03-11 DIAGNOSIS — Z12.31 ENCOUNTER FOR SCREENING MAMMOGRAM FOR BREAST CANCER: ICD-10-CM

## 2024-03-11 DIAGNOSIS — R92.8 FOLLOW-UP EXAMINATION OF ABNORMAL MAMMOGRAM: ICD-10-CM

## 2024-03-11 PROCEDURE — 77063 BREAST TOMOSYNTHESIS BI: CPT

## 2024-03-11 PROCEDURE — 77067 SCR MAMMO BI INCL CAD: CPT

## 2024-05-21 ENCOUNTER — OFFICE VISIT (OUTPATIENT)
Dept: ENDOCRINOLOGY | Facility: CLINIC | Age: 64
End: 2024-05-21
Payer: COMMERCIAL

## 2024-05-21 VITALS
SYSTOLIC BLOOD PRESSURE: 162 MMHG | DIASTOLIC BLOOD PRESSURE: 84 MMHG | OXYGEN SATURATION: 99 % | HEIGHT: 65 IN | HEART RATE: 72 BPM | BODY MASS INDEX: 36.12 KG/M2 | WEIGHT: 216.8 LBS

## 2024-05-21 DIAGNOSIS — I10 ESSENTIAL HYPERTENSION: Primary | ICD-10-CM

## 2024-05-21 DIAGNOSIS — E03.9 ACQUIRED HYPOTHYROIDISM: ICD-10-CM

## 2024-05-21 DIAGNOSIS — R73.09 ELEVATED GLUCOSE: ICD-10-CM

## 2024-05-21 DIAGNOSIS — E55.9 VITAMIN D DEFICIENCY: ICD-10-CM

## 2024-05-21 DIAGNOSIS — E78.00 PURE HYPERCHOLESTEROLEMIA: ICD-10-CM

## 2024-05-21 PROCEDURE — 99214 OFFICE O/P EST MOD 30 MIN: CPT | Performed by: INTERNAL MEDICINE

## 2024-05-21 NOTE — PROGRESS NOTES
Ruthann Morales 63 y.o.  CC:Follow-up, Hypothyroidism, Hyperlipidemia, Hypertension (Pt stopped losartan after losing weight), and Vitamin D Deficiency    [place]: Follow-up, Hypothyroidism, Hyperlipidemia, Hypertension (Pt stopped losartan after losing weight), and Vitamin D Deficiency    Bp 128/80 at home   Eating low fat diet   Has lost 30 lbs   Recheck bp is 128/80  Energy good  Taking wegovy for weight loss    Allergies   Allergen Reactions    Sulfa Antibiotics Rash       Current Outpatient Medications:     Cholecalciferol (VITAMIN D) 1000 UNITS tablet, Take 2 tablets by mouth Daily., Disp: , Rfl:     Synthroid 100 MCG tablet, Take 1 tablet by mouth Daily., Disp: 90 tablet, Rfl: 3  Patient Active Problem List    Diagnosis     Essential hypertension [I10]     Vitamin D deficiency [E55.9]     Arthralgia of right knee [M25.561]     Elevated glucose [R73.09]     Right foot pain [M79.671]     Leg cramps [R25.2]     Pure hypercholesterolemia [E78.00]     Rash [R21]     Chronic fatigue [R53.82]     Abdominal pain [R10.9]     Hypothyroidism [E03.9]     Irreducible incisional hernia [K43.0]      Review of Systems   Constitutional:  Negative for activity change, appetite change and unexpected weight change.   HENT:  Negative for congestion and rhinorrhea.    Eyes:  Negative for visual disturbance.   Respiratory:  Negative for cough and shortness of breath.    Cardiovascular:  Negative for palpitations and leg swelling.   Gastrointestinal:  Negative for constipation, diarrhea and nausea.   Genitourinary:  Negative for hematuria.   Musculoskeletal:  Negative for arthralgias, back pain, gait problem, joint swelling and myalgias.   Skin:  Negative for color change, rash and wound.   Allergic/Immunologic: Negative for environmental allergies, food allergies and immunocompromised state.   Neurological:  Negative for dizziness, weakness and light-headedness.   Psychiatric/Behavioral:  Negative for confusion, decreased  "concentration, dysphoric mood and sleep disturbance. The patient is not nervous/anxious.      Social History     Socioeconomic History    Marital status:    Tobacco Use    Smoking status: Never    Smokeless tobacco: Never   Substance and Sexual Activity    Alcohol use: No     Comment: occasionally    Drug use: No    Sexual activity: Defer     Comment:      Family History   Problem Relation Age of Onset    Cancer Mother     Breast cancer Mother     Cancer Father     Stroke Other     Diabetes Other     Heart attack Other     Breast cancer Maternal Aunt      /84   Pulse 72   Ht 165.1 cm (65\")   Wt 98.3 kg (216 lb 12.8 oz)   SpO2 99%   BMI 36.08 kg/m²   Physical Exam  Vitals and nursing note reviewed.   Constitutional:       Appearance: Normal appearance. She is well-developed.   HENT:      Head: Normocephalic and atraumatic.   Eyes:      General: Lids are normal.      Extraocular Movements: Extraocular movements intact.      Conjunctiva/sclera: Conjunctivae normal.      Pupils: Pupils are equal, round, and reactive to light.   Neck:      Thyroid: No thyroid mass or thyromegaly.      Vascular: No carotid bruit.      Trachea: Trachea normal. No tracheal deviation.   Cardiovascular:      Rate and Rhythm: Normal rate and regular rhythm.      Pulses: Normal pulses.      Heart sounds: Normal heart sounds. No murmur heard.     No friction rub. No gallop.   Pulmonary:      Effort: Pulmonary effort is normal. No respiratory distress.      Breath sounds: Normal breath sounds. No wheezing.   Musculoskeletal:         General: No deformity. Normal range of motion.      Cervical back: Normal range of motion and neck supple.   Lymphadenopathy:      Cervical: No cervical adenopathy.   Skin:     General: Skin is warm and dry.      Findings: No erythema or rash.      Nails: There is no clubbing.   Neurological:      General: No focal deficit present.      Mental Status: She is alert and oriented to person, " place, and time.      Cranial Nerves: No cranial nerve deficit.      Deep Tendon Reflexes: Reflexes are normal and symmetric. Reflexes normal.   Psychiatric:         Mood and Affect: Mood normal.         Speech: Speech normal.         Behavior: Behavior normal.         Thought Content: Thought content normal.         Judgment: Judgment normal.       Results for orders placed or performed in visit on 11/21/23   TSH+Free T4   Result Value Ref Range    TSH 0.952 0.450 - 4.500 uIU/mL    Free T4 1.26 0.82 - 1.77 ng/dL   Comprehensive Metabolic Panel   Result Value Ref Range    Glucose 116 (H) 70 - 99 mg/dL    BUN 18 8 - 27 mg/dL    Creatinine 0.80 0.57 - 1.00 mg/dL    EGFR Result 83 >59 mL/min/1.73    BUN/Creatinine Ratio 23 12 - 28    Sodium 141 134 - 144 mmol/L    Potassium 4.1 3.5 - 5.2 mmol/L    Chloride 106 96 - 106 mmol/L    Total CO2 20 20 - 29 mmol/L    Calcium 9.0 8.7 - 10.3 mg/dL    Total Protein 7.2 6.0 - 8.5 g/dL    Albumin 4.3 3.9 - 4.9 g/dL    Globulin 2.9 1.5 - 4.5 g/dL    A/G Ratio 1.5 1.2 - 2.2    Total Bilirubin 0.3 0.0 - 1.2 mg/dL    Alkaline Phosphatase 106 44 - 121 IU/L    AST (SGOT) 22 0 - 40 IU/L    ALT (SGPT) 35 (H) 0 - 32 IU/L   Lipid Panel   Result Value Ref Range    Total Cholesterol 144 100 - 199 mg/dL    Triglycerides 174 (H) 0 - 149 mg/dL    HDL Cholesterol 35 (L) >39 mg/dL    VLDL Cholesterol [place] 30 5 - 40 mg/dL    LDL Chol Calc (NIH) 79 0 - 99 mg/dL   Hemoglobin A1c   Result Value Ref Range    Hemoglobin A1C 6.0 (H) 4.8 - 5.6 %   Ambig Abbrev CMP14 Default   Result Value Ref Range    Ambig Abbrev CMP14 Default Comment    Ambig Abbrev LP Default   Result Value Ref Range    Ambig Abbrev LP Default Comment    CBC & Differential   Result Value Ref Range    WBC 7.7 3.4 - 10.8 x10E3/uL    RBC 4.50 3.77 - 5.28 x10E6/uL    Hemoglobin 13.7 11.1 - 15.9 g/dL    Hematocrit 41.8 34.0 - 46.6 %    MCV 93 79 - 97 fL    MCH 30.4 26.6 - 33.0 pg    MCHC 32.8 31.5 - 35.7 g/dL    RDW 12.8 11.7 - 15.4 %     Platelets 231 150 - 450 x10E3/uL    Neutrophil Rel % 59 Not Estab. %    Lymphocyte Rel % 31 Not Estab. %    Monocyte Rel % 5 Not Estab. %    Eosinophil Rel % 4 Not Estab. %    Basophil Rel % 1 Not Estab. %    Neutrophils Absolute 4.5 1.4 - 7.0 x10E3/uL    Lymphocytes Absolute 2.4 0.7 - 3.1 x10E3/uL    Monocytes Absolute 0.4 0.1 - 0.9 x10E3/uL    Eosinophils Absolute 0.3 0.0 - 0.4 x10E3/uL    Basophils Absolute 0.1 0.0 - 0.2 x10E3/uL    Immature Granulocyte Rel % 0 Not Estab. %    Immature Grans Absolute 0.0 0.0 - 0.1 x10E3/uL     Diagnoses and all orders for this visit:    1. Essential hypertension (Primary)  Assessment & Plan:  High initial bp - repeat normal   Is monitoring at home   Goal less than 135/85 discussed       2. Elevated glucose  Assessment & Plan:  Update A1c- order printed  Commended weight loss     Orders:  -     Comprehensive Metabolic Panel; Future  -     Hemoglobin A1c; Future    3. Acquired hypothyroidism  Assessment & Plan:  Taking synthroid 100 mcg daily   Check tfts     Orders:  -     TSH; Future  -     T4, Free; Future    4. Pure hypercholesterolemia  Assessment & Plan:  Continue low fat diet- check flp - order provided     Orders:  -     Lipid Panel; Future    5. Vitamin D deficiency  Assessment & Plan:  Taking vitamin D supplement  Update levels     Orders:  -     Vitamin D,25-Hydroxy; Future    Return in about 6 months (around 11/21/2024) for Recheck.    Electronically signed by: Kia Morelos MD

## 2024-05-23 LAB
25(OH)D3+25(OH)D2 SERPL-MCNC: 42.2 NG/ML (ref 30–100)
ALBUMIN SERPL-MCNC: 4.3 G/DL (ref 3.9–4.9)
ALBUMIN/GLOB SERPL: 1.5 {RATIO} (ref 1.2–2.2)
ALP SERPL-CCNC: 109 IU/L (ref 44–121)
ALT SERPL-CCNC: 20 IU/L (ref 0–32)
AMBIG ABBREV CMP14 DEFAULT: NORMAL
AMBIG ABBREV LP DEFAULT: NORMAL
AST SERPL-CCNC: 17 IU/L (ref 0–40)
BILIRUB SERPL-MCNC: 0.5 MG/DL (ref 0–1.2)
BUN SERPL-MCNC: 18 MG/DL (ref 8–27)
BUN/CREAT SERPL: 23 (ref 12–28)
CALCIUM SERPL-MCNC: 9.4 MG/DL (ref 8.7–10.3)
CHLORIDE SERPL-SCNC: 106 MMOL/L (ref 96–106)
CHOLEST SERPL-MCNC: 123 MG/DL (ref 100–199)
CO2 SERPL-SCNC: 21 MMOL/L (ref 20–29)
CREAT SERPL-MCNC: 0.79 MG/DL (ref 0.57–1)
EGFRCR SERPLBLD CKD-EPI 2021: 83 ML/MIN/1.73
GLOBULIN SER CALC-MCNC: 2.9 G/DL (ref 1.5–4.5)
GLUCOSE SERPL-MCNC: 108 MG/DL (ref 70–99)
HBA1C MFR BLD: 5.7 % (ref 4.8–5.6)
HDLC SERPL-MCNC: 41 MG/DL
LDLC SERPL CALC-MCNC: 66 MG/DL (ref 0–99)
POTASSIUM SERPL-SCNC: 4.4 MMOL/L (ref 3.5–5.2)
PROT SERPL-MCNC: 7.2 G/DL (ref 6–8.5)
SODIUM SERPL-SCNC: 139 MMOL/L (ref 134–144)
T4 FREE SERPL-MCNC: 1.35 NG/DL (ref 0.82–1.77)
TRIGL SERPL-MCNC: 83 MG/DL (ref 0–149)
TSH SERPL DL<=0.005 MIU/L-ACNC: 0.59 UIU/ML (ref 0.45–4.5)
VLDLC SERPL CALC-MCNC: 16 MG/DL (ref 5–40)

## 2024-09-24 RX ORDER — LEVOTHYROXINE SODIUM 100 MCG
100 TABLET ORAL DAILY
Qty: 90 TABLET | Refills: 1 | Status: SHIPPED | OUTPATIENT
Start: 2024-09-24

## 2025-02-13 ENCOUNTER — OFFICE VISIT (OUTPATIENT)
Dept: ENDOCRINOLOGY | Facility: CLINIC | Age: 65
End: 2025-02-13
Payer: MEDICARE

## 2025-02-13 VITALS
HEART RATE: 70 BPM | HEIGHT: 65 IN | BODY MASS INDEX: 37.15 KG/M2 | DIASTOLIC BLOOD PRESSURE: 74 MMHG | SYSTOLIC BLOOD PRESSURE: 132 MMHG | WEIGHT: 223 LBS

## 2025-02-13 DIAGNOSIS — R73.09 ELEVATED GLUCOSE: ICD-10-CM

## 2025-02-13 DIAGNOSIS — E03.9 ACQUIRED HYPOTHYROIDISM: ICD-10-CM

## 2025-02-13 DIAGNOSIS — E55.9 VITAMIN D DEFICIENCY: ICD-10-CM

## 2025-02-13 DIAGNOSIS — I10 ESSENTIAL HYPERTENSION: Primary | ICD-10-CM

## 2025-02-13 LAB — HBA1C MFR BLD: 5.3 % (ref 4.8–5.6)

## 2025-02-13 PROCEDURE — 1159F MED LIST DOCD IN RCRD: CPT | Performed by: INTERNAL MEDICINE

## 2025-02-13 PROCEDURE — 84439 ASSAY OF FREE THYROXINE: CPT | Performed by: INTERNAL MEDICINE

## 2025-02-13 PROCEDURE — 99214 OFFICE O/P EST MOD 30 MIN: CPT | Performed by: INTERNAL MEDICINE

## 2025-02-13 PROCEDURE — 1160F RVW MEDS BY RX/DR IN RCRD: CPT | Performed by: INTERNAL MEDICINE

## 2025-02-13 PROCEDURE — 36415 COLL VENOUS BLD VENIPUNCTURE: CPT | Performed by: INTERNAL MEDICINE

## 2025-02-13 PROCEDURE — 3078F DIAST BP <80 MM HG: CPT | Performed by: INTERNAL MEDICINE

## 2025-02-13 PROCEDURE — 3075F SYST BP GE 130 - 139MM HG: CPT | Performed by: INTERNAL MEDICINE

## 2025-02-13 PROCEDURE — 82306 VITAMIN D 25 HYDROXY: CPT | Performed by: INTERNAL MEDICINE

## 2025-02-13 PROCEDURE — 84443 ASSAY THYROID STIM HORMONE: CPT | Performed by: INTERNAL MEDICINE

## 2025-02-13 PROCEDURE — 83036 HEMOGLOBIN GLYCOSYLATED A1C: CPT | Performed by: INTERNAL MEDICINE

## 2025-02-13 PROCEDURE — 80061 LIPID PANEL: CPT | Performed by: INTERNAL MEDICINE

## 2025-02-13 PROCEDURE — 80053 COMPREHEN METABOLIC PANEL: CPT | Performed by: INTERNAL MEDICINE

## 2025-02-13 NOTE — PROGRESS NOTES
Ruthann Morales 64 y.o.    CC: follow up Hypothyroid     [place]: follow up Hypothyroid also elevated sugar, hyperlipidemia    Bp is good   Weight is up about 7 lbs  Is taking vitamin D supplement   Weight and blood sugar were better taking zepbound but not covered    Allergies   Allergen Reactions    Sulfa Antibiotics Rash       Current Outpatient Medications:     Cholecalciferol (VITAMIN D) 1000 UNITS tablet, Take 2 tablets by mouth Daily., Disp: , Rfl:     Synthroid 100 MCG tablet, TAKE 1 TABLET DAILY, Disp: 90 tablet, Rfl: 1  Patient Active Problem List    Diagnosis     Essential hypertension [I10]     Vitamin D deficiency [E55.9]     Arthralgia of right knee [M25.561]     Elevated glucose [R73.09]     Right foot pain [M79.671]     Leg cramps [R25.2]     Pure hypercholesterolemia [E78.00]     Rash [R21]     Chronic fatigue [R53.82]     Abdominal pain [R10.9]     Hypothyroidism [E03.9]     Irreducible incisional hernia [K43.0]      Review of Systems   Constitutional:  Negative for activity change, appetite change and unexpected weight change.   HENT:  Negative for congestion and rhinorrhea.    Eyes:  Negative for visual disturbance.   Respiratory:  Negative for cough and shortness of breath.    Cardiovascular:  Negative for palpitations and leg swelling.   Gastrointestinal:  Negative for constipation, diarrhea and nausea.   Genitourinary:  Negative for hematuria.   Musculoskeletal:  Negative for arthralgias, back pain, gait problem, joint swelling and myalgias.   Skin:  Negative for color change, rash and wound.   Allergic/Immunologic: Negative for environmental allergies, food allergies and immunocompromised state.   Neurological:  Negative for dizziness, weakness and light-headedness.   Psychiatric/Behavioral:  Negative for confusion, decreased concentration, dysphoric mood and sleep disturbance. The patient is not nervous/anxious.      Social History     Socioeconomic History    Marital status:   "  Tobacco Use    Smoking status: Never    Smokeless tobacco: Never   Vaping Use    Vaping status: Never Used   Substance and Sexual Activity    Alcohol use: No     Comment: occasionally    Drug use: No    Sexual activity: Defer     Comment:      Family History   Problem Relation Age of Onset    Cancer Mother     Breast cancer Mother     Cancer Father     Stroke Other     Diabetes Other     Heart attack Other     Breast cancer Maternal Aunt      /74   Pulse 70   Ht 165.1 cm (65\")   Wt 101 kg (223 lb)   BMI 37.11 kg/m²   Physical Exam  Vitals and nursing note reviewed.   Constitutional:       Appearance: Normal appearance. She is well-developed.   HENT:      Head: Normocephalic and atraumatic.   Eyes:      General: Lids are normal.      Extraocular Movements: Extraocular movements intact.      Conjunctiva/sclera: Conjunctivae normal.      Pupils: Pupils are equal, round, and reactive to light.   Neck:      Thyroid: No thyroid mass or thyromegaly.      Vascular: No carotid bruit.      Trachea: Trachea normal. No tracheal deviation.   Cardiovascular:      Rate and Rhythm: Normal rate and regular rhythm.      Pulses: Normal pulses.      Heart sounds: Normal heart sounds. No murmur heard.     No friction rub. No gallop.   Pulmonary:      Effort: Pulmonary effort is normal. No respiratory distress.      Breath sounds: Normal breath sounds. No wheezing.   Musculoskeletal:         General: No deformity. Normal range of motion.      Cervical back: Normal range of motion and neck supple.   Lymphadenopathy:      Cervical: No cervical adenopathy.   Skin:     General: Skin is warm and dry.      Findings: No erythema or rash.      Nails: There is no clubbing.   Neurological:      General: No focal deficit present.      Mental Status: She is alert and oriented to person, place, and time.      Cranial Nerves: No cranial nerve deficit.      Deep Tendon Reflexes: Reflexes are normal and symmetric. Reflexes normal. "   Psychiatric:         Mood and Affect: Mood normal.         Speech: Speech normal.         Behavior: Behavior normal.         Thought Content: Thought content normal.         Judgment: Judgment normal.       Results for orders placed or performed in visit on 05/22/24   TSH+Free T4    Collection Time: 05/22/24  8:34 AM   Result Value Ref Range    TSH 0.590 0.450 - 4.500 uIU/mL    Free T4 1.35 0.82 - 1.77 ng/dL   Comprehensive Metabolic Panel    Collection Time: 05/22/24  8:34 AM   Result Value Ref Range    Glucose 108 (H) 70 - 99 mg/dL    BUN 18 8 - 27 mg/dL    Creatinine 0.79 0.57 - 1.00 mg/dL    EGFR Result 83 >59 mL/min/1.73    BUN/Creatinine Ratio 23 12 - 28    Sodium 139 134 - 144 mmol/L    Potassium 4.4 3.5 - 5.2 mmol/L    Chloride 106 96 - 106 mmol/L    Total CO2 21 20 - 29 mmol/L    Calcium 9.4 8.7 - 10.3 mg/dL    Total Protein 7.2 6.0 - 8.5 g/dL    Albumin 4.3 3.9 - 4.9 g/dL    Globulin 2.9 1.5 - 4.5 g/dL    A/G Ratio 1.5 1.2 - 2.2    Total Bilirubin 0.5 0.0 - 1.2 mg/dL    Alkaline Phosphatase 109 44 - 121 IU/L    AST (SGOT) 17 0 - 40 IU/L    ALT (SGPT) 20 0 - 32 IU/L   Lipid Panel    Collection Time: 05/22/24  8:34 AM   Result Value Ref Range    Total Cholesterol 123 100 - 199 mg/dL    Triglycerides 83 0 - 149 mg/dL    HDL Cholesterol 41 >39 mg/dL    VLDL Cholesterol Brigido 16 5 - 40 mg/dL    LDL Chol Calc (NIH) 66 0 - 99 mg/dL   Hemoglobin A1c    Collection Time: 05/22/24  8:34 AM   Result Value Ref Range    Hemoglobin A1C 5.7 (H) 4.8 - 5.6 %   Vitamin D,25-Hydroxy    Collection Time: 05/22/24  8:34 AM   Result Value Ref Range    25 Hydroxy, Vitamin D 42.2 30.0 - 100.0 ng/mL   Manpreet Vang CMP14 Default    Collection Time: 05/22/24  8:34 AM   Result Value Ref Range    Manpreet Vang CMP14 Default Comment    Manpreet Vang LP Default    Collection Time: 05/22/24  8:34 AM   Result Value Ref Range    Manpreet Vang LP Default Comment      Diagnoses and all orders for this visit:    1. Essential hypertension  (Primary)  Assessment & Plan:  Good control today- not on medication     Orders:  -     Comprehensive Metabolic Panel; Future  -     Comprehensive Metabolic Panel    2. Acquired hypothyroidism  Assessment & Plan:  Taking synthroid 100 mcg daily   Check tfts    Orders:  -     T4, Free; Future  -     TSH; Future  -     Lipid Panel; Future  -     T4, Free  -     TSH  -     Lipid Panel    3. Vitamin D deficiency  Assessment & Plan:  Continue otc supplement- update levels     Orders:  -     Vitamin D,25-Hydroxy; Future  -     Vitamin D,25-Hydroxy  -     Cancel: Vitamin D,25-Hydroxy    4. Elevated glucose  Assessment & Plan:  Update A1c   Continue diet, activity   Off GLP-1 currently     Orders:  -     Hemoglobin A1c; Future  -     Hemoglobin A1c    Return in about 6 months (around 8/13/2025) for Recheck.    Electronically signed by: Kia Morelos MD

## 2025-02-14 LAB
25(OH)D3 SERPL-MCNC: 33.2 NG/ML (ref 30–100)
ALBUMIN SERPL-MCNC: 4.1 G/DL (ref 3.5–5.2)
ALBUMIN/GLOB SERPL: 1.2 G/DL
ALP SERPL-CCNC: 93 U/L (ref 39–117)
ALT SERPL W P-5'-P-CCNC: 20 U/L (ref 1–33)
ANION GAP SERPL CALCULATED.3IONS-SCNC: 11 MMOL/L (ref 5–15)
AST SERPL-CCNC: 16 U/L (ref 1–32)
BILIRUB SERPL-MCNC: 0.3 MG/DL (ref 0–1.2)
BUN SERPL-MCNC: 16 MG/DL (ref 8–23)
BUN/CREAT SERPL: 21.6 (ref 7–25)
CALCIUM SPEC-SCNC: 9.5 MG/DL (ref 8.6–10.5)
CHLORIDE SERPL-SCNC: 104 MMOL/L (ref 98–107)
CHOLEST SERPL-MCNC: 145 MG/DL (ref 0–200)
CO2 SERPL-SCNC: 23 MMOL/L (ref 22–29)
CREAT SERPL-MCNC: 0.74 MG/DL (ref 0.57–1)
EGFRCR SERPLBLD CKD-EPI 2021: 89.9 ML/MIN/1.73
GLOBULIN UR ELPH-MCNC: 3.5 GM/DL
GLUCOSE SERPL-MCNC: 96 MG/DL (ref 65–99)
HDLC SERPL-MCNC: 37 MG/DL (ref 40–60)
LDLC SERPL CALC-MCNC: 82 MG/DL (ref 0–100)
LDLC/HDLC SERPL: 2.13 {RATIO}
POTASSIUM SERPL-SCNC: 3.9 MMOL/L (ref 3.5–5.2)
PROT SERPL-MCNC: 7.6 G/DL (ref 6–8.5)
SODIUM SERPL-SCNC: 138 MMOL/L (ref 136–145)
T4 FREE SERPL-MCNC: 1.3 NG/DL (ref 0.92–1.68)
TRIGL SERPL-MCNC: 146 MG/DL (ref 0–150)
TSH SERPL DL<=0.05 MIU/L-ACNC: 0.29 UIU/ML (ref 0.27–4.2)
VLDLC SERPL-MCNC: 26 MG/DL (ref 5–40)

## 2025-03-24 ENCOUNTER — HOSPITAL ENCOUNTER (OUTPATIENT)
Dept: MAMMOGRAPHY | Facility: HOSPITAL | Age: 65
Discharge: HOME | End: 2025-03-24
Admitting: NURSE PRACTITIONER
Payer: MEDICARE

## 2025-03-24 DIAGNOSIS — Z12.31 SCREENING MAMMOGRAM, ENCOUNTER FOR: ICD-10-CM

## 2025-03-24 PROCEDURE — 77067 SCR MAMMO BI INCL CAD: CPT

## 2025-03-24 PROCEDURE — 77063 BREAST TOMOSYNTHESIS BI: CPT

## 2025-03-25 ENCOUNTER — HOSPITAL ENCOUNTER (OUTPATIENT)
Dept: MAMMOGRAPHY | Facility: HOSPITAL | Age: 65
Discharge: HOME | End: 2025-03-25
Payer: MEDICARE

## 2025-03-25 ENCOUNTER — HOSPITAL ENCOUNTER (OUTPATIENT)
Dept: ULTRASOUND IMAGING | Facility: HOSPITAL | Age: 65
Discharge: HOME | End: 2025-03-25
Payer: MEDICARE

## 2025-03-25 DIAGNOSIS — R92.8 ABNORMAL MAMMOGRAM OF LEFT BREAST: ICD-10-CM

## 2025-03-25 PROCEDURE — 77065 DX MAMMO INCL CAD UNI: CPT

## 2025-03-25 PROCEDURE — 76642 ULTRASOUND BREAST LIMITED: CPT

## 2025-03-25 PROCEDURE — G0279 TOMOSYNTHESIS, MAMMO: HCPCS

## 2025-03-27 ENCOUNTER — HOSPITAL ENCOUNTER (OUTPATIENT)
Dept: MAMMOGRAPHY | Facility: HOSPITAL | Age: 65
Discharge: HOME | End: 2025-03-27
Payer: MEDICARE

## 2025-03-27 ENCOUNTER — HOSPITAL ENCOUNTER (OUTPATIENT)
Dept: ULTRASOUND IMAGING | Facility: HOSPITAL | Age: 65
Discharge: HOME | End: 2025-03-27
Payer: MEDICARE

## 2025-03-27 DIAGNOSIS — R92.8 ABNORMAL MAMMOGRAM OF LEFT BREAST: ICD-10-CM

## 2025-04-01 LAB — REF LAB TEST METHOD: NORMAL

## 2025-04-02 NOTE — PROGRESS NOTES
Subjective   Ruthann Morales is a 64 y.o. female.   Chief Complaint   Patient presents with    Breast Cancer        History of Present Illness The patient is in the office today for evaluation and treatment of DCIS of the left breast. The patient had a left breast needle core biopsy performed on 03/27/2025. The pathology report was received and showed focus of microinvasive ductal carcinoma (approximately 1 mm) arising from within a background of low-grade ductal  carcinoma in situ (DCIS) with associated microcalcification. Vessels with medial calcific atherosclerosis. ER: Positive, TX: Positive, HER2/carlo: Negative.         The following portions of the patient's history were reviewed and updated as appropriate: allergies, current medications, past family history, past medical history, past social history, past surgical history, and problem list.    Review of Systems   Constitutional:  Negative for chills, fever and unexpected weight loss.   HENT:  Negative for hearing loss, trouble swallowing and voice change.    Eyes:  Negative for visual disturbance.   Respiratory:  Negative for apnea, cough, chest tightness, shortness of breath and wheezing.    Cardiovascular:  Negative for chest pain, palpitations and leg swelling.   Gastrointestinal:  Negative for abdominal distention, abdominal pain, anal bleeding, blood in stool, constipation, diarrhea, nausea, rectal pain, vomiting, GERD and indigestion.   Endocrine: Negative for cold intolerance and heat intolerance.   Genitourinary:  Negative for difficulty urinating, dysuria and flank pain.   Musculoskeletal:  Negative for back pain and gait problem.   Skin:  Negative for color change, rash, skin lesions and wound.   Neurological:  Negative for dizziness, syncope, speech difficulty, weakness, light-headedness and numbness.   Hematological:  Negative for adenopathy. Does not bruise/bleed easily.   Psychiatric/Behavioral:  Negative for depressed mood. The patient is  not nervous/anxious.        Objective   Physical Exam      Assessment & Plan   Diagnoses and all orders for this visit:    1. Invasive ductal carcinoma of breast, female, left (Primary)  -     Case Request; Standing  -     sodium chloride 0.9 % flush 10 mL  -     sodium chloride 0.9 % flush 10 mL  -     sodium chloride 0.9 % infusion 40 mL  -     lactated ringers infusion  -     heparin (porcine) 5000 UNIT/ML injection 5,000 Units  -     ceFAZolin (ANCEF) 2,000 mg in sodium chloride 0.9 % 100 mL IVPB  -     Case Request    Other orders  -     propranolol (INDERAL) 10 MG tablet; Take 1 tablet by mouth 2 (Two) Times a Day.  Dispense: 60 tablet; Refill: 0  -     Follow Anesthesia Guidelines / Protocol; Future  -     Follow Anesthesia Guidelines / Protocol; Standing  -     Verify / Perform Chlorhexidine Skin Prep; Standing  -     Provide NPO Instructions to Patient; Future  -     Chlorhexidine Skin Prep; Future  -     Insert Peripheral IV; Standing  -     Saline Lock & Maintain IV Access; Standing  -     Place Sequential Compression Device; Standing  -     Maintain Sequential Compression Device; Standing

## 2025-04-02 NOTE — H&P (VIEW-ONLY)
Subjective   Ruthann Morales is a 64 y.o. female.   Chief Complaint   Patient presents with    Breast Cancer        History of Present Illness     Ruthann is a 64-year-old female patient known to me from a remote encounter, who presents today to discuss recently diagnosed left breast cancer.  The patient underwent her routine screening mammogram In March 2025, and was found to have focal asymmetry with distortion in the left upper outer quadrant requiring additional imaging.  Diagnostic views were obtained, and she was noted to have a persistent asymmetry in the left upper outer quadrant, BI-RADS 4.  Subsequent ultrasound demonstrated an irregular hypoechoic lesion in the 2 o'clock position of the left breast measuring 13 x 11 x 11 mm corresponding to the mammographic abnormality.  Note was also made of an adjacent 6 mm x 4 mm hypoechoic nodule within 1 cm of the larger lesion.  A biopsy was performed under ultrasound visualization of the larger lesion.  Repeat ultrasound after initial vacuum-assisted biopsy and clip deployment demonstrated active bleeding in the region of the biopsy with hematoma formation.  For this reason, the second, smaller lesion was not biopsied.  Pathology ultimately demonstrated a focus of microinvasive ductal carcinoma, approximately 1 mm, arising within a background of low-grade ductal carcinoma in situ with associated microcalcifications.  This was ER positive, AK positive, and HER2 negative.    The patient reports that she was asymptomatic prior to undergoing screening mammogram.  She does have a strong family history of breast cancer involving both her mother and her maternal aunt.  Both ladies were in their 50s when diagnosed.  She denies having previous breast biopsies.      The following portions of the patient's history were reviewed and updated as appropriate: allergies, current medications, past family history, past medical history, past social history, past surgical history,  "and problem list.      Patient Active Problem List   Diagnosis    Abdominal pain    Hypothyroidism    Irreducible incisional hernia    Chronic fatigue    Pure hypercholesterolemia    Rash    Leg cramps    Right foot pain    Elevated glucose    Arthralgia of right knee    Essential hypertension    Vitamin D deficiency    Invasive ductal carcinoma of breast, female, left       Past Medical History:   Diagnosis Date    Abdominal hernia     Anxiety     Arthritis     \"maybe\"    Breast lump     left    Disease of thyroid gland     Exposure to TB     30 years ago    Hypertension     Hypothyroidism     Invasive ductal carcinoma of breast, female, left     PONV (postoperative nausea and vomiting)     Vitamin D deficiency 2023       Past Surgical History:   Procedure Laterality Date    APPENDECTOMY      BREAST BIOPSY Left      SECTION      two    HERNIA REPAIR      HERNIA REPAIR      KNEE ARTHROSCOPY W/ MENISCAL REPAIR Right     TOTAL KNEE ARTHROPLASTY Right        Medications:   No current facility-administered medications on file prior to visit.     Current Outpatient Medications on File Prior to Visit   Medication Sig    Cholecalciferol (VITAMIN D) 1000 UNITS tablet Take 2 tablets by mouth Daily.    Synthroid 100 MCG tablet TAKE 1 TABLET DAILY         Allergies:   Allergies   Allergen Reactions    Sulfa Antibiotics Rash         Family History   Problem Relation Age of Onset    Lymphoma Mother     Breast cancer Mother 57    Multiple myeloma Father     Diabetes Maternal Grandfather     Diabetes Paternal Grandmother     Breast cancer Maternal Aunt 54    Stroke Other     Diabetes Other     Heart attack Other        Social History     Socioeconomic History    Marital status:    Tobacco Use    Smoking status: Never    Smokeless tobacco: Never   Vaping Use    Vaping status: Never Used   Substance and Sexual Activity    Alcohol use: No    Drug use: No    Sexual activity: Not Currently     Partners: Male    " " Birth control/protection: None     Comment:        Review of Systems   Constitutional:  Negative for chills, fever and unexpected weight loss.   HENT:  Negative for hearing loss, trouble swallowing and voice change.    Eyes:  Negative for visual disturbance.   Respiratory:  Negative for apnea, cough, chest tightness, shortness of breath and wheezing.    Cardiovascular:  Negative for chest pain, palpitations and leg swelling.   Gastrointestinal:  Negative for abdominal distention, abdominal pain, anal bleeding, blood in stool, constipation, diarrhea, nausea, rectal pain, vomiting, GERD and indigestion.   Endocrine: Negative for cold intolerance and heat intolerance.   Genitourinary:  Negative for difficulty urinating, dysuria and flank pain.   Musculoskeletal:  Negative for back pain and gait problem.   Skin:  Negative for color change, rash, skin lesions and wound.   Neurological:  Negative for dizziness, syncope, speech difficulty, weakness, light-headedness and numbness.   Hematological:  Negative for adenopathy. Does not bruise/bleed easily.   Psychiatric/Behavioral:  Negative for depressed mood. The patient is not nervous/anxious.    I have reviewed and confirmed the accuracy of the ROS as documented by the MA/LPN/RN Tiesha Gunter MD      Objective   /88   Pulse 59   Temp 97.7 °F (36.5 °C) (Infrared)   Ht 165.1 cm (65\")   Wt 99.3 kg (219 lb)   SpO2 97%   BMI 36.44 kg/m²     Physical Exam  Constitutional:       Appearance: She is well-developed.   HENT:      Head: Normocephalic and atraumatic.   Cardiovascular:      Rate and Rhythm: Regular rhythm.   Pulmonary:      Effort: Pulmonary effort is normal.   Skin:     General: Skin is warm and dry.   Neurological:      Mental Status: She is alert and oriented to person, place, and time.   Psychiatric:         Behavior: Behavior normal.       Outside Records:  I have taken the opportunity to review the patient's available outside records in paper " format, scanned format and those available on Care Everywhere    Results Review:  I have reviewed the entirety of the patient's clinical lab results.  I have also personally reviewed the relevant patient imaging.       Narrative & Impression   EXAMINATION: MAMMO DIAGNOSTIC DIGITAL TOMOSYNTHESIS LEFT W CAD-      CLINICAL INDICATION:  abnormal mammogram left breast; R92.8-Other  abnormal and inconclusive findings on diagnostic imaging of breast     TECHNIQUE: Left problem solving views were obtained with 2-D and 3-D  digital acquisitions. The study was read with the assistance of CAD.         COMPARISON: Exams dating back to 2021     DENSITY:  There are scattered areas of fibroglandular density     FINDINGS:   An area of increasing focal asymmetry is seen left upper  outer quadrant. There is mild new nodularity within the focal asymmetry  along with architectural distortion. Directed ultrasound reveals  adjacent irregular solid nodules at this site located at 2 and 3  o'clock. Findings are regarded as suspicious for neoplasm.     IMPRESSION:  Left upper outer quadrant focal asymmetry with architectural  distortion and solid nodule on ultrasound     BI-RADS CATEGORY: 4 , SUSPICIOUS ABNORMALITY.     RECOMMENDED FOLLOW-UP: Ultrasound directed biopsy        A letter including results and recommendation was sent to the patient.  Density notification was provided to patients with type III or type IV  breast tissue pattern.     Patient information was entered into a reminder system with a target due  date for the next mammogram        NOTES: Mammography does not detect approximately 10-15% of breast  cancers. Physical examination of the breasts by a physician and regular  monthly breast self examinations are integral parts of breast cancer  screening.       A normal mammogram does not exclude breast cancer if there is an  abnormal finding on physical examination. When clinically indicated, a  biopsy should not be postponed  because of a normal mammogram report.          Please allow this report to serve as a order for additional imaging  follow-up        The [place]. [address]     NOTE: If a biopsy is performed on this patient, a copy of the pathology  report would be appreciated.        This report was signed and finalized on 3/25/2025 4:52 PM by Rodrick Wilkins MD.       Narrative & Impression   ULTRASOUND LEFT BREAST     CLINICAL INDICATION:  abn mammogram left breast; R92.8-Other abnormal  and inconclusive findings on diagnostic imaging of breast     TECHNIQUE: Directed sonographic evaluation left breast     COMPARISON: None     FINDINGS:   There is an irregular hypoechoic lesion in the left breast  at 2 o'clock measuring 13 x 11 x 11 mm. This corresponds to the  mammographic abnormality. There is an adjacent 6 x 4 mm hypoechoic  nodule within 1 cm of the larger lesion.     No cysts are seen. There is no adenopathy.     IMPRESSION:  13 mm solid nodule accounting for the mammographic  abnormality with a possible 2nd smaller nodule measuring 6 mm.     BI-RADS CATEGORY: 4 , SUSPICIOUS ABNORMALITY.        RECOMMENDED FOLLOW-UP: Ultrasound-directed biopsy.     NOTES: Mammography does not detect approximately 10-15% of breast  cancers. Physical examination of the breasts by a physician and regular  monthly breast self examinations are integral parts of breast cancer  screening.       A normal breast imaging exam does not exclude breast cancer if there is  an abnormal finding on physical examination. When clinically indicated,  a biopsy should not be postponed because of a normal breast imaging  report.          Please allow this report to serve as a order for additional imaging  follow-up        The [place]. [address]     NOTE: If a biopsy is performed on this patient, a copy of the pathology  report would be appreciated.           This report  was signed and finalized on 3/26/2025 11:46 AM by Rodrick Wilkins MD.        Histopathology results reveal DCIS with microinvasive component.  Pathology is concordant with mammographic findings.     Surgical and medical oncologic follow-up recommended. It again should be  noted that there was  a small subcentimeter adjacent nodule within 1 cm  of the area of biopsy which could represent extension of primary tumor  or a multifocal component. This area could not be biopsied due to  bleeding after initial biopsy obscuring areas of interest.     This report was signed and finalized on 4/1/2025 4:57 PM by Rodrick Wilkins MD.      Addended by Rodrick Wilkins MD on 4/1/2025  4:57 PM     Study Result    Narrative & Impression      ULTRASOUND-GUIDED LEFT BREAST CORE BIOPSY     HISTORY: Abnormal mammo left breast, UOQ; R92.8-Other abnormal and  inconclusive findings on diagnostic imaging of breast.     NOTE: The dominant lesion of interest was located at 2 o'clock with a  smaller subcentimeter hypoechoic nodular area within 1 cm of the  dominant lesion. It is unclear whether this represented separate  multifocal breast lesions or simply extension of the main lesion more  inferiorly. Original intent was to biopsy the 2nd lesion although due to  size, location along the pectoralis muscle and the presence of bleeding  after the initial biopsy, the 2nd smaller lesion was not sampled.        TECHNIQUE: The left breast was prepped in a routine sterile fashion and  locally anesthetized with 1% lidocaine. An 11-gauge vacuum-assisted  hand-held device was utilized. The needle was positioned posterior to  the lesion. Multiple vacuum assisted core samples were obtained. Lesion  was noted to be significantly smaller following biopsy.      A biopsy marker clip was deployed in satisfactory position. Patient  experienced arterial type bleeding during and after the procedure.  Manual pressure was applied for 15-20 minutes. Repeat  ultrasound  directed toward the area of interest showed known artery in the region  of the biopsy without pseudoaneurysm or active bleeding. A post biopsy  mammogram was performed and dictated separately.     Limited postbiopsy images showed moderate amount of hemorrhage within  the region of biopsy. This obscured the smaller lesion on ultrasound.   Marker clip is noted to be in satisfactory position. Procedure was well  tolerated.     IMPRESSION:  1. Technically successful guided vacuum assisted core biopsy of dominant  left breast lesion as above. Smaller adjacent lesion not sampled due to  the presence of bleeding, location and size.  2.  Biopsy marker clip deployed           This report was signed and finalized on 3/28/2025 5:49 PM by Rodrick Wilkins MD.         Narrative & Impression   MAMMOGRAM RIGHT 2D        TECHNIQUE: Standard digital 2D views        COMPARISON: 03/27/2025 and 03/25/2025.        DENSITY: There are scattered areas of fibroglandular density        FINDINGS: Post biopsy marker clip is noted in satisfactory position.  Postbiopsy changes are noted. Focal asymmetry and distortion is obscured  by parenchymal hemorrhage at the biopsy site.        IMPRESSION:  Biopsy marker clip in good position. Hemorrhage at the  biopsy site obscures the underlying findings.        ASSESSMENT: Post procedure mammogram for marker placement         RECOMMENDATION: Pending histopathology evaluation     This report was signed and finalized on 3/27/2025 11:11 AM by Rodrick Wilkins MD.       Assessment & Plan   Diagnoses and all orders for this visit:    1. Invasive ductal carcinoma of breast, female, left (Primary)  -     Case Request; Standing  -     sodium chloride 0.9 % flush 10 mL  -     sodium chloride 0.9 % flush 10 mL  -     sodium chloride 0.9 % infusion 40 mL  -     lactated ringers infusion  -     heparin (porcine) 5000 UNIT/ML injection 5,000 Units  -     ceFAZolin (ANCEF) 2,000 mg in sodium chloride 0.9 % 100  mL IVPB  -     Case Request    Other orders  -     propranolol (INDERAL) 10 MG tablet; Take 1 tablet by mouth 2 (Two) Times a Day.  Dispense: 60 tablet; Refill: 0  -     Follow Anesthesia Guidelines / Protocol; Future  -     Follow Anesthesia Guidelines / Protocol; Standing  -     Verify / Perform Chlorhexidine Skin Prep; Standing  -     Provide NPO Instructions to Patient; Future  -     Chlorhexidine Skin Prep; Future  -     Insert Peripheral IV; Standing  -     Saline Lock & Maintain IV Access; Standing  -     Place Sequential Compression Device; Standing  -     Maintain Sequential Compression Device; Standing    Ruthann is a 64-year-old female patient recently found to have a microinvasive ductal carcinoma of the left breast within a background of DCIS.  I explained to the patient that DCIS is we discussed the spectrum from benign breast disease to atypia, DCIS, and invasive breast cancer.  We discussed the similarities between the treatment of DCIS and early stage invasive breast cancers.  In this case, the patient does have a microinvasive component, and there is certainly potential for further upstaging at the time of surgical excision which may be as high as 10 to 15% depending on which case series is decided.  Surgical options were discussed including breast conservation therapy and mastectomy. We discussed the equivalent survival with breast conserving therapy and mastectomy. Risk of local recurrence discussed. Radiation reduces the risk of local recurrence by approximately 50% and endocrine therapy can further reduce the risk of local recurrence. Additionally, endocrine therapy reduces the contralateral breast cancer risk by about half. We further discussed the local recurrence rates with breast conservation versus mastectomy. I explained that about 50% of local recurrences after breast conservation are invasive. I also explained that if she has a local recurrence after lumpectomy with radiation, mastectomy  would be indicated, and reconstructive results may be less optimal or involve a more complex procedure. I explained that a sentinel node biopsy would be indicated with either a lumpectomy or mastectomy given the microinvasive component seen on biopsy.  This is primarily for staging purposes.   She understands that with treatment her prognosis is excellent and that both options have shown equivalent long-term overall survival results. We reviewed both procedures including risks and benefits. I informed the patient that further discussion regarding the role of chemotherapy and adjuvant hormone therapy would take place with the Medical Oncologist and radiation oncologist.  At the conclusion of our discussion, the patient wished to proceed with breast conservation via needle localized lumpectomy and sentinel node biopsy.  She understands that she will need to be n.p.o. after midnight the evening prior to the scheduled surgical procedure.  My office will assist her in making arranges for scheduling, and preadmission testing.

## 2025-04-07 ENCOUNTER — OFFICE VISIT (OUTPATIENT)
Dept: INTERNAL MEDICINE | Facility: CLINIC | Age: 65
End: 2025-04-07
Payer: MEDICARE

## 2025-04-07 VITALS
OXYGEN SATURATION: 98 % | SYSTOLIC BLOOD PRESSURE: 136 MMHG | HEIGHT: 65 IN | HEART RATE: 86 BPM | TEMPERATURE: 97.7 F | BODY MASS INDEX: 36.65 KG/M2 | DIASTOLIC BLOOD PRESSURE: 82 MMHG | WEIGHT: 220 LBS

## 2025-04-07 DIAGNOSIS — R92.8 ABNORMAL MAMMOGRAM OF LEFT BREAST: ICD-10-CM

## 2025-04-07 DIAGNOSIS — F43.23 SITUATIONAL MIXED ANXIETY AND DEPRESSIVE DISORDER: ICD-10-CM

## 2025-04-07 DIAGNOSIS — Z76.89 ENCOUNTER TO ESTABLISH CARE: Primary | ICD-10-CM

## 2025-04-07 RX ORDER — BUSPIRONE HYDROCHLORIDE 5 MG/1
5 TABLET ORAL 3 TIMES DAILY
Qty: 90 TABLET | Refills: 0 | Status: SHIPPED | OUTPATIENT
Start: 2025-04-07

## 2025-04-07 RX ORDER — HYDROXYZINE HYDROCHLORIDE 25 MG/1
1 TABLET, FILM COATED ORAL 3 TIMES DAILY
COMMUNITY
Start: 2025-04-02

## 2025-04-07 NOTE — PROGRESS NOTES
"      Female Physical Note      Date: 2025   Patient Name: Ruthann Morales  : 1960   MRN: [id number]     Chief Complaint:    Chief Complaint   Patient presents with    Establish Care     With Ana pagan    Anxiety     Recently DX with breast cancer, hydroxyzine doesn't seem to be helping.       History of Present Illness: Ruthann Morales is a 64 y.o. female presenting with  to establish care. Wants to discuss anxiety, recent diagnosis of breast cancer.   History of Present Illness  She was diagnosed with left breast cancer last week, which has significantly elevated her stress levels. She is scheduled to consult with Dr. Lois Gunter, a surgeon, on Thursday. She is experiencing difficulty in decision-making, particularly regarding the choice between lumpectomy or mastectomy. She has no prior history of abnormalities detected on previous mammograms. She reports experiencing panic and anxiety since her diagnosis. She also mentions that she is claustrophobic and avoids situations that trigger this condition. She has not sought therapy for her claustrophobia but acknowledges the potential benefit of doing so. She has not undergone genetic testing for breast cancer in the past due to fears she would test positive for the gene. She experienced severe pain following her biopsy, which has since improved. States they \"hit an artery\". She did not experience any breast pain prior to her mammogram and did not detect any lumps. Her mother had breast cancer and ultimately passed from it, but she is unsure if it was the same type.  This has also brought up memories of her mother. She is tearful.     She has been prescribed medication, hydroxyzine, for anxiety, but it has not been effective. Makes her tired but otherwise does not help. She has not tried any other treatments for her anxiety in the past. Her sleep is disrupted at times, often waking up in the middle of the night, but overall sleeping " ok. She expresses a desire to avoid long-term medication use. She is currently taking vitamin D and Synthroid and follows endocrinology who is managing, had recent labs in February.    Vaccines and screenings were reviewed; will address on another visit    Subjective      Review of Systems:   Pertinent ROS in HPI    Past Medical History, Social History, Family History and Care Team were all reviewed with patient and updated as appropriate.     Medications:     Current Outpatient Medications:     Cholecalciferol (VITAMIN D) 1000 UNITS tablet, Take 2 tablets by mouth Daily., Disp: , Rfl:     hydrOXYzine (ATARAX) 25 MG tablet, Take 1 tablet by mouth 3 times a day., Disp: , Rfl:     Synthroid 100 MCG tablet, TAKE 1 TABLET DAILY, Disp: 90 tablet, Rfl: 1    busPIRone (BUSPAR) 5 MG tablet, Take 1 tablet by mouth 3 (Three) Times a Day., Disp: 90 tablet, Rfl: 0    Allergies:   Allergies   Allergen Reactions    Sulfa Antibiotics Rash       Immunizations:  Immunization History   Administered Date(s) Administered    COVID-19 ([facility]) 03/19/2021    Tdap 05/17/2022        Mammogram:   Last Completed Mammogram            Upcoming       MAMMOGRAM (Every 2 Years) Next due on 3/25/2027      03/25/2025  Mammo Diagnostic Digital Tomosynthesis Left With CAD    03/24/2025  Mammo Screening Digital Tomosynthesis Bilateral With CAD    03/11/2024  Mammo Screening Digital Tomosynthesis Bilateral With CAD    03/06/2023  Mammo Diagnostic Digital Tomosynthesis Bilateral With CAD    09/19/2022  Mammo Diagnostic Digital Tomosynthesis Right With CAD     Only the first 5 history entries have been loaded, but more history exists.                             Tobacco Use: Low Risk  (4/7/2025)    Patient History     Smoking Tobacco Use: Never     Smokeless Tobacco Use: Never     Passive Exposure: Not on file       Social History     Substance and Sexual Activity   Alcohol Use No    Comment: occasionally        Social History     Substance and Sexual  Activity   Drug Use Never        PHQ-9 Depression Screening  Little interest or pleasure in doing things? Nearly every day   Feeling down, depressed, or hopeless? Nearly every day   PHQ-2 Total Score 6   Trouble falling or staying asleep, or sleeping too much? More than half the days   Feeling tired or having little energy? Nearly every day   Poor appetite or overeating? Several days   Feeling bad about yourself - or that you are a failure or have let yourself or your family down? Nearly every day   Trouble concentrating on things, such as reading the newspaper or watching television? More than half the days   Moving or speaking so slowly that other people could have noticed? Or the opposite - being so fidgety or restless that you have been moving around a lot more than usual? Nearly every day     Thoughts that you would be better off dead, or of hurting yourself in some way? Not at all   PHQ-9 Total Score 20   If you checked off any problems, how difficult have these problems made it for you to do your work, take care of things at home, or get along with other people? Somewhat difficult        4/7/2025   Anxiety SYLVIA-7    Feeling nervous, anxious or on edge 3    Not being able to stop or control worrying 3    Worrying too much about different things 3    Trouble Relaxing 3    Being so restless that it is hard to sit still 3    Becoming easily annoyed or irritable 3    Feeling afraid as if something awful might happen 3    SYLVIA 7 Total Score 21    If you checked any problems, how difficult have these problems made it for you to do your work, take care of things at home, or get along with other people Somewhat difficult        The 10-year ASCVD risk score (Jeanette MCCLURE, et al., 2019) is: 6.2%    Values used to calculate the score:      Age: 64 years      Sex: Female      Is Non- : No      Diabetic: No      Tobacco smoker: No      Systolic Blood Pressure: 136 mmHg      Is BP treated: No      HDL  Cholesterol: 37 mg/dL      Total Cholesterol: 145 mg/dL    Labs:  Results for orders placed or performed during the hospital encounter of 25   TISSUE EXAM, [facility] ([facility])    Collection Time: 25 10:26 AM    Specimen: Breast, Left; Tissue   Result Value Ref Range    Reference Lab Report       [facility]  [address] [place] [address]  Phone: [phone] or [phone]  Fax: [phone]  Milton Fair M.D., Medical Director    PATIENT NAME                           LABORATORY NO.  [id number]  AUDRA OTT.                  [id number]  [id number]                         AGE              SEX  SSN           CLIENT REF #  [place] [id number]      1960  F    [id number]   [id number]    [address] [place] [address] BY-PASS                REQUESTING M.D.     ATTENDING MDARSHANA     COPY TO.  [address]                        [place] [address]                 DATE COLLECTED      DATE RECEIVED      DATE REPORTED  2025    DIAGNOSIS:  BREAST, BIOPSY, NEEDLE CORES, LEFT:  Focus of microinvasive ductal carcinoma (approximately 1 mm) arising from  within a background of low-grade ductal carcinoma in situ (DCIS) with  associated microcalcifications  Vessels with medial calcific  atherosclerosis  ER: Positive  ND: Positive  HER2/carlo: Negative    COMMENT:  IMMUNOHISTOCHEMICAL RESULTS (IHC):    P40: Highlights loss of myoepithelial cell layer  SMHC: Highlights loss of myoepithelial cell layer  E-cadherin: Positive, supporting ductal origin  Estrogen Receptor      RESULT: Positive   >95% 3+ (INTENSITY SCORE)  Progesterone Receptor   RESULT: Positive  40%   3+ (INTENSITY SCORE)  HER2/carlo                                RESULT: Negative N/A 0+ (INTENSITY  SCORE)    Tissue cold time prior to fixation:  10 minutes  Total fixation time (neutral buffered formalin): 6-72 hours    Estrogen receptor ([place] clone SP1,  "polymer, IVD), progesterone receptor  ([place] clone IE2, polymer, IVD), and HER-2/carlo oncoprotein ([place] clone  4B5, polymer, IVD) are performed on buffered formalin fixed, paraffin  embedded tissue and is performed according to FDA approved protocol and  interpreted per 2018 ASCO/CAP guidelines. Variables that can affect the  reliability of the assays  include cold ischemia time prior to fixation and total  fixation time outside of ASCO/CAP guidelines, processing with decalcification  agents, and fixatives other than 10% neutral buffered formalin.    CLINICAL HISTORY:  Abnormal mammogram of left breast    SPECIMENS RECEIVED:  BREAST, BIOPSY, NEEDLE CORES, LEFT    MICROSCOPIC DESCRIPTION:  Tissue blocks are prepared and slides are examined microscopically on all  specimens. See diagnosis for details.    Professional interpretation rendered by Delisa Barone D.O., [email or web address] at  [email or web address], [address] [place] [address].    GROSS DESCRIPTION:  Labeled \"left breast\" consists of 3 tan cores ranging in length from 0.7 to 1.2 cm,  and ranging in diameter from 0.2 to 0.3 cm.  Specimens are submitted in block  A1.  Cold ischemic time unspecified.  Estimated time in formalin greater than 6  hours.  Additionally received floating consists of multiple pieces of black-brown soft  tissue measuring 2.5 x 1.8 x 0.9 cm in aggregate.   Specimens are filtered and  submitted in blocks A2-A3.  BKO    REVIEWED, DIAGNOSED AND ELECTRONICALLY  SIGNED BY:    Delisa Barone D.O., OLLIE.C.A.P.  CPT CODES:  40593, 88341x4, 88360x2         Objective     Vital Signs:   Vitals:    04/07/25 1053   BP: 136/82   Pulse: 86   Temp: 97.7 °F (36.5 °C)   SpO2: 98%   Weight: 99.8 kg (220 lb)   Height: 165.1 cm (65\")   PainSc: 0-No pain       Physical Exam  Vitals and nursing note reviewed.   Constitutional:       General: She is not in acute distress.     Appearance: Normal appearance.   HENT:      Head: Normocephalic and atraumatic. "   Eyes:      General: No scleral icterus.     Extraocular Movements: Extraocular movements intact.      Pupils: Pupils are equal, round, and reactive to light.   Cardiovascular:      Rate and Rhythm: Normal rate and regular rhythm.   Pulmonary:      Effort: Pulmonary effort is normal.      Breath sounds: Normal breath sounds.   Musculoskeletal:         General: Normal range of motion.      Cervical back: Normal range of motion.   Skin:     General: Skin is warm and dry.   Neurological:      General: No focal deficit present.      Mental Status: She is alert and oriented to person, place, and time.   Psychiatric:         Mood and Affect: Mood is anxious and depressed. Affect is tearful.         Assessment / Plan      Assessment/Plan:   Diagnoses and all orders for this visit:    1. Encounter to establish care (Primary)    2. Situational mixed anxiety and depressive disorder  -     busPIRone (BUSPAR) 5 MG tablet; Take 1 tablet by mouth 3 (Three) Times a Day.  Dispense: 90 tablet; Refill: 0    3. Abnormal mammogram of left breast       Assessment & Plan  Situational mixed anxiety and depressive disorder  Mood is situational related to recent diagnosis of breast cancer.  Failed hydroxyzine.  Start buspirone 5 mg 3 times a day.  Discussed potential side effects.  Follow-up mood in 4 weeks.  Okay to take hydroxyzine as needed for sleep.  Discussed talk therapy if needing additional support, she does have a supportive family and .    Breast biopsy shows focus of microinvasive ductal carcinoma approximately 1 mm arising from within a background of low-grade ductal carcinoma in situ with associated microcalcifications. She is scheduled to see Dr. Lois Gunter on Thursday to discuss surgical options.      Ruthann Morales voices understanding and acceptance of this advice and will call back with any further questions or concerns. AVS with preventive healthcare tips printed for patient.     Follow Up:   Return in  about 4 weeks (around 5/5/2025) for anxiety.      MERT Guzmán  [facility]

## 2025-04-10 ENCOUNTER — OFFICE VISIT (OUTPATIENT)
Dept: SURGERY | Facility: CLINIC | Age: 65
End: 2025-04-10
Payer: MEDICARE

## 2025-04-10 VITALS
DIASTOLIC BLOOD PRESSURE: 88 MMHG | OXYGEN SATURATION: 97 % | WEIGHT: 219 LBS | HEART RATE: 59 BPM | TEMPERATURE: 97.7 F | SYSTOLIC BLOOD PRESSURE: 148 MMHG | HEIGHT: 65 IN | BODY MASS INDEX: 36.49 KG/M2

## 2025-04-10 DIAGNOSIS — C50.912 INVASIVE DUCTAL CARCINOMA OF BREAST, FEMALE, LEFT: Primary | ICD-10-CM

## 2025-04-10 RX ORDER — SODIUM CHLORIDE 0.9 % (FLUSH) 0.9 %
10 SYRINGE (ML) INJECTION AS NEEDED
OUTPATIENT
Start: 2025-04-10

## 2025-04-10 RX ORDER — HEPARIN SODIUM 5000 [USP'U]/ML
5000 INJECTION, SOLUTION INTRAVENOUS; SUBCUTANEOUS ONCE
OUTPATIENT
Start: 2025-04-10 | End: 2025-04-10

## 2025-04-10 RX ORDER — SODIUM CHLORIDE 9 MG/ML
40 INJECTION, SOLUTION INTRAVENOUS AS NEEDED
OUTPATIENT
Start: 2025-04-10

## 2025-04-10 RX ORDER — PROPRANOLOL HYDROCHLORIDE 10 MG/1
10 TABLET ORAL 2 TIMES DAILY
Qty: 60 TABLET | Refills: 0 | Status: SHIPPED | OUTPATIENT
Start: 2025-04-10

## 2025-04-10 RX ORDER — SODIUM CHLORIDE 0.9 % (FLUSH) 0.9 %
10 SYRINGE (ML) INJECTION EVERY 12 HOURS SCHEDULED
OUTPATIENT
Start: 2025-04-10

## 2025-04-10 RX ORDER — SODIUM CHLORIDE, SODIUM LACTATE, POTASSIUM CHLORIDE, CALCIUM CHLORIDE 600; 310; 30; 20 MG/100ML; MG/100ML; MG/100ML; MG/100ML
50 INJECTION, SOLUTION INTRAVENOUS CONTINUOUS
OUTPATIENT
Start: 2025-04-10 | End: 2025-04-11

## 2025-04-28 ENCOUNTER — ANESTHESIA EVENT (OUTPATIENT)
Dept: PERIOP | Facility: HOSPITAL | Age: 65
End: 2025-04-28
Payer: MEDICARE

## 2025-04-28 NOTE — ANESTHESIA PREPROCEDURE EVALUATION
Anesthesia Evaluation     Patient summary reviewed and Nursing notes reviewed   history of anesthetic complications:  PONV  NPO Solid Status: > 8 hours  NPO Liquid Status: > 2 hours           Airway   Mallampati: II  TM distance: >3 FB  Neck ROM: full  No difficulty expected  Dental - normal exam     Pulmonary - normal exam   Cardiovascular - normal exam    ECG reviewed  Rhythm: regular  Rate: normal    (+) hypertension well controlled less than 2 medications, hyperlipidemia    ROS comment: 12/10/2015  EKG NSR Cannot r/o anterior infarct    Neuro/Psych  (+) psychiatric history Anxiety  GI/Hepatic/Renal/Endo    (+) obesity (bmi 36.4), thyroid problem hypothyroidism    ROS Comment: Abdominal hernia    Musculoskeletal     (+) arthralgias (right knee)  Abdominal   (+) obese   Substance History      OB/GYN          Other   arthritis,   history of cancer (Invasive ductal carcinoma of breast) active    ROS/Med Hx Other: Chronic fatigue    Leg cramps                Anesthesia Plan    ASA 3     MAC and general     (Risks and benefits discussed including risk of aspiration, recall and dental damage. All patient questions answered.    Will continue with plan of care.)    Anesthetic plan, risks, benefits, and alternatives have been provided, discussed and informed consent has been obtained with: patient.  Pre-procedure education provided    CODE STATUS:

## 2025-04-29 ENCOUNTER — APPOINTMENT (OUTPATIENT)
Dept: NUCLEAR MEDICINE | Facility: HOSPITAL | Age: 65
End: 2025-04-29
Payer: MEDICARE

## 2025-04-29 ENCOUNTER — HOSPITAL ENCOUNTER (OUTPATIENT)
Facility: HOSPITAL | Age: 65
Setting detail: HOSPITAL OUTPATIENT SURGERY
Discharge: HOME | End: 2025-04-29
Attending: SURGERY | Admitting: SURGERY
Payer: MEDICARE

## 2025-04-29 ENCOUNTER — APPOINTMENT (OUTPATIENT)
Dept: MAMMOGRAPHY | Facility: HOSPITAL | Age: 65
End: 2025-04-29
Payer: MEDICARE

## 2025-04-29 ENCOUNTER — HOSPITAL ENCOUNTER (OUTPATIENT)
Dept: ULTRASOUND IMAGING | Facility: HOSPITAL | Age: 65
Discharge: HOME | End: 2025-04-29
Payer: MEDICARE

## 2025-04-29 ENCOUNTER — ANESTHESIA (OUTPATIENT)
Dept: PERIOP | Facility: HOSPITAL | Age: 65
End: 2025-04-29
Payer: MEDICARE

## 2025-04-29 VITALS
BODY MASS INDEX: 36.49 KG/M2 | HEIGHT: 65 IN | DIASTOLIC BLOOD PRESSURE: 86 MMHG | HEART RATE: 66 BPM | SYSTOLIC BLOOD PRESSURE: 186 MMHG | WEIGHT: 219 LBS | OXYGEN SATURATION: 96 % | RESPIRATION RATE: 14 BRPM | TEMPERATURE: 97.5 F

## 2025-04-29 DIAGNOSIS — C50.912 INVASIVE DUCTAL CARCINOMA OF BREAST, FEMALE, LEFT: ICD-10-CM

## 2025-04-29 DIAGNOSIS — C50.912 INVASIVE DUCTAL CARCINOMA OF LEFT BREAST: ICD-10-CM

## 2025-04-29 DIAGNOSIS — C50.912 INVASIVE DUCTAL CARCINOMA OF LEFT BREAST: Primary | ICD-10-CM

## 2025-04-29 PROCEDURE — 25810000003 LACTATED RINGERS PER 1000 ML: Performed by: SURGERY

## 2025-04-29 PROCEDURE — 25010000002 LIDOCAINE 1 % SOLUTION: Performed by: SURGERY

## 2025-04-29 PROCEDURE — 25010000002 MIDAZOLAM PER 1MG: Performed by: NURSE ANESTHETIST, CERTIFIED REGISTERED

## 2025-04-29 PROCEDURE — 25010000002 HYDROMORPHONE 1 MG/ML SOLUTION: Performed by: NURSE ANESTHETIST, CERTIFIED REGISTERED

## 2025-04-29 PROCEDURE — 76098 X-RAY EXAM SURGICAL SPECIMEN: CPT

## 2025-04-29 PROCEDURE — 25010000002 CEFAZOLIN PER 500 MG: Performed by: SURGERY

## 2025-04-29 PROCEDURE — 25010000002 BUPIVACAINE (PF) 0.25 % SOLUTION: Performed by: SURGERY

## 2025-04-29 PROCEDURE — 88342 IMHCHEM/IMCYTCHM 1ST ANTB: CPT

## 2025-04-29 PROCEDURE — 19301 PARTIAL MASTECTOMY: CPT | Performed by: SURGERY

## 2025-04-29 PROCEDURE — 88360 TUMOR IMMUNOHISTOCHEM/MANUAL: CPT

## 2025-04-29 PROCEDURE — 38500 BIOPSY/REMOVAL LYMPH NODES: CPT | Performed by: SURGERY

## 2025-04-29 PROCEDURE — 25810000003 LACTATED RINGERS PER 1000 ML: Performed by: NURSE ANESTHETIST, CERTIFIED REGISTERED

## 2025-04-29 PROCEDURE — 25010000002 HEPARIN (PORCINE) PER 1000 UNITS: Performed by: SURGERY

## 2025-04-29 PROCEDURE — 88341 IMHCHEM/IMCYTCHM EA ADD ANTB: CPT

## 2025-04-29 PROCEDURE — C1819 TISSUE LOCALIZATION-EXCISION: HCPCS

## 2025-04-29 PROCEDURE — 25010000002 ONDANSETRON PER 1 MG: Performed by: NURSE ANESTHETIST, CERTIFIED REGISTERED

## 2025-04-29 PROCEDURE — 25010000002 FENTANYL CITRATE (PF) 50 MCG/ML SOLUTION: Performed by: NURSE ANESTHETIST, CERTIFIED REGISTERED

## 2025-04-29 PROCEDURE — 38792 RA TRACER ID OF SENTINL NODE: CPT

## 2025-04-29 PROCEDURE — 34310000005 TECHNETIUM FILTERED SULFUR COLLOID: Performed by: SURGERY

## 2025-04-29 PROCEDURE — 25010000002 PROPOFOL 10 MG/ML EMULSION: Performed by: NURSE ANESTHETIST, CERTIFIED REGISTERED

## 2025-04-29 PROCEDURE — 88307 TISSUE EXAM BY PATHOLOGIST: CPT

## 2025-04-29 PROCEDURE — A9541 TC99M SULFUR COLLOID: HCPCS | Performed by: SURGERY

## 2025-04-29 DEVICE — LIGACLIP MCA MULTIPLE CLIP APPLIERS, 30 MEDIUM CLIPS
Type: IMPLANTABLE DEVICE | Site: BREAST | Status: FUNCTIONAL
Brand: LIGACLIP

## 2025-04-29 RX ORDER — ONDANSETRON 2 MG/ML
4 INJECTION INTRAMUSCULAR; INTRAVENOUS ONCE AS NEEDED
Status: DISCONTINUED | OUTPATIENT
Start: 2025-04-29 | End: 2025-04-29 | Stop reason: HOSPADM

## 2025-04-29 RX ORDER — SODIUM CHLORIDE 0.9 % (FLUSH) 0.9 %
10 SYRINGE (ML) INJECTION EVERY 12 HOURS SCHEDULED
Status: DISCONTINUED | OUTPATIENT
Start: 2025-04-29 | End: 2025-04-29 | Stop reason: HOSPADM

## 2025-04-29 RX ORDER — LORAZEPAM 2 MG/ML
1 INJECTION INTRAMUSCULAR ONCE AS NEEDED
Status: DISCONTINUED | OUTPATIENT
Start: 2025-04-29 | End: 2025-04-29 | Stop reason: HOSPADM

## 2025-04-29 RX ORDER — HYDROCODONE BITARTRATE AND ACETAMINOPHEN 7.5; 325 MG/1; MG/1
1 TABLET ORAL EVERY 4 HOURS PRN
Qty: 10 TABLET | Refills: 0 | Status: SHIPPED | OUTPATIENT
Start: 2025-04-29

## 2025-04-29 RX ORDER — SCOPOLAMINE 1 MG/3D
1 PATCH, EXTENDED RELEASE TRANSDERMAL
Status: DISCONTINUED | OUTPATIENT
Start: 2025-04-29 | End: 2025-04-29 | Stop reason: HOSPADM

## 2025-04-29 RX ORDER — SODIUM CHLORIDE 9 MG/ML
40 INJECTION, SOLUTION INTRAVENOUS AS NEEDED
Status: DISCONTINUED | OUTPATIENT
Start: 2025-04-29 | End: 2025-04-29 | Stop reason: HOSPADM

## 2025-04-29 RX ORDER — SODIUM CHLORIDE, SODIUM LACTATE, POTASSIUM CHLORIDE, CALCIUM CHLORIDE 600; 310; 30; 20 MG/100ML; MG/100ML; MG/100ML; MG/100ML
INJECTION, SOLUTION INTRAVENOUS CONTINUOUS PRN
Status: DISCONTINUED | OUTPATIENT
Start: 2025-04-29 | End: 2025-04-29 | Stop reason: SURG

## 2025-04-29 RX ORDER — HYDROCODONE BITARTRATE AND ACETAMINOPHEN 7.5; 325 MG/1; MG/1
1 TABLET ORAL ONCE AS NEEDED
Refills: 0 | Status: DISCONTINUED | OUTPATIENT
Start: 2025-04-29 | End: 2025-04-29 | Stop reason: HOSPADM

## 2025-04-29 RX ORDER — MORPHINE SULFATE 2 MG/ML
2 INJECTION, SOLUTION INTRAMUSCULAR; INTRAVENOUS
Status: DISCONTINUED | OUTPATIENT
Start: 2025-04-29 | End: 2025-04-29 | Stop reason: HOSPADM

## 2025-04-29 RX ORDER — FENTANYL CITRATE 50 UG/ML
INJECTION, SOLUTION INTRAMUSCULAR; INTRAVENOUS AS NEEDED
Status: DISCONTINUED | OUTPATIENT
Start: 2025-04-29 | End: 2025-04-29 | Stop reason: SURG

## 2025-04-29 RX ORDER — SODIUM CHLORIDE 0.9 % (FLUSH) 0.9 %
10 SYRINGE (ML) INJECTION AS NEEDED
Status: DISCONTINUED | OUTPATIENT
Start: 2025-04-29 | End: 2025-04-29 | Stop reason: HOSPADM

## 2025-04-29 RX ORDER — ONDANSETRON 2 MG/ML
4 INJECTION INTRAMUSCULAR; INTRAVENOUS ONCE AS NEEDED
Status: COMPLETED | OUTPATIENT
Start: 2025-04-29 | End: 2025-04-29

## 2025-04-29 RX ORDER — MEPERIDINE HYDROCHLORIDE 25 MG/ML
25 INJECTION INTRAMUSCULAR; INTRAVENOUS; SUBCUTANEOUS ONCE AS NEEDED
Status: DISCONTINUED | OUTPATIENT
Start: 2025-04-29 | End: 2025-04-29 | Stop reason: HOSPADM

## 2025-04-29 RX ORDER — HEPARIN SODIUM 5000 [USP'U]/ML
5000 INJECTION, SOLUTION INTRAVENOUS; SUBCUTANEOUS ONCE
Status: COMPLETED | OUTPATIENT
Start: 2025-04-29 | End: 2025-04-29

## 2025-04-29 RX ORDER — PROPOFOL 10 MG/ML
VIAL (ML) INTRAVENOUS AS NEEDED
Status: DISCONTINUED | OUTPATIENT
Start: 2025-04-29 | End: 2025-04-29 | Stop reason: SURG

## 2025-04-29 RX ORDER — LIDOCAINE HYDROCHLORIDE 10 MG/ML
INJECTION, SOLUTION INFILTRATION; PERINEURAL AS NEEDED
Status: DISCONTINUED | OUTPATIENT
Start: 2025-04-29 | End: 2025-04-29 | Stop reason: HOSPADM

## 2025-04-29 RX ORDER — ONDANSETRON 2 MG/ML
INJECTION INTRAMUSCULAR; INTRAVENOUS AS NEEDED
Status: DISCONTINUED | OUTPATIENT
Start: 2025-04-29 | End: 2025-04-29 | Stop reason: SURG

## 2025-04-29 RX ORDER — MAGNESIUM HYDROXIDE 1200 MG/15ML
LIQUID ORAL AS NEEDED
Status: DISCONTINUED | OUTPATIENT
Start: 2025-04-29 | End: 2025-04-29 | Stop reason: HOSPADM

## 2025-04-29 RX ORDER — MIDAZOLAM HYDROCHLORIDE 2 MG/2ML
2 INJECTION, SOLUTION INTRAMUSCULAR; INTRAVENOUS ONCE
Status: COMPLETED | OUTPATIENT
Start: 2025-04-29 | End: 2025-04-29

## 2025-04-29 RX ORDER — SODIUM CHLORIDE, SODIUM LACTATE, POTASSIUM CHLORIDE, CALCIUM CHLORIDE 600; 310; 30; 20 MG/100ML; MG/100ML; MG/100ML; MG/100ML
50 INJECTION, SOLUTION INTRAVENOUS CONTINUOUS
Status: DISCONTINUED | OUTPATIENT
Start: 2025-04-29 | End: 2025-04-29 | Stop reason: HOSPADM

## 2025-04-29 RX ORDER — BUPIVACAINE HYDROCHLORIDE 2.5 MG/ML
INJECTION, SOLUTION EPIDURAL; INFILTRATION; INTRACAUDAL; PERINEURAL AS NEEDED
Status: DISCONTINUED | OUTPATIENT
Start: 2025-04-29 | End: 2025-04-29 | Stop reason: HOSPADM

## 2025-04-29 RX ADMIN — FENTANYL CITRATE 25 MCG: 50 INJECTION, SOLUTION INTRAMUSCULAR; INTRAVENOUS at 12:05

## 2025-04-29 RX ADMIN — SCOPOLAMINE 1 PATCH: 1.5 PATCH, EXTENDED RELEASE TRANSDERMAL at 08:31

## 2025-04-29 RX ADMIN — FENTANYL CITRATE 25 MCG: 50 INJECTION, SOLUTION INTRAMUSCULAR; INTRAVENOUS at 12:03

## 2025-04-29 RX ADMIN — TECHNETIUM TC 99M SULFUR COLLOID 1 DOSE: KIT at 10:08

## 2025-04-29 RX ADMIN — MIDAZOLAM HYDROCHLORIDE 2 MG: 1 INJECTION, SOLUTION INTRAMUSCULAR; INTRAVENOUS at 08:32

## 2025-04-29 RX ADMIN — HEPARIN SODIUM 5000 UNITS: 5000 INJECTION INTRAVENOUS; SUBCUTANEOUS at 10:16

## 2025-04-29 RX ADMIN — PROPOFOL 200 MG: 10 INJECTION, EMULSION INTRAVENOUS at 10:31

## 2025-04-29 RX ADMIN — SODIUM CHLORIDE 2000 MG: 9 INJECTION, SOLUTION INTRAVENOUS at 10:33

## 2025-04-29 RX ADMIN — ONDANSETRON 4 MG: 2 INJECTION INTRAMUSCULAR; INTRAVENOUS at 13:01

## 2025-04-29 RX ADMIN — ONDANSETRON 4 MG: 2 INJECTION INTRAMUSCULAR; INTRAVENOUS at 10:31

## 2025-04-29 RX ADMIN — HYDROMORPHONE HYDROCHLORIDE 0.5 MG: 1 INJECTION, SOLUTION INTRAMUSCULAR; INTRAVENOUS; SUBCUTANEOUS at 12:58

## 2025-04-29 RX ADMIN — FENTANYL CITRATE 50 MCG: 50 INJECTION, SOLUTION INTRAMUSCULAR; INTRAVENOUS at 10:54

## 2025-04-29 RX ADMIN — SODIUM CHLORIDE, POTASSIUM CHLORIDE, SODIUM LACTATE AND CALCIUM CHLORIDE 50 ML/HR: 600; 310; 30; 20 INJECTION, SOLUTION INTRAVENOUS at 08:26

## 2025-04-29 RX ADMIN — SODIUM CHLORIDE, POTASSIUM CHLORIDE, SODIUM LACTATE AND CALCIUM CHLORIDE: 600; 310; 30; 20 INJECTION, SOLUTION INTRAVENOUS at 12:37

## 2025-04-29 NOTE — ANESTHESIA PROCEDURE NOTES
Airway  Reason: elective    Date/Time: 4/29/2025 10:46 AM  Airway not difficult    General Information and Staff    Patient location during procedure: OR  CRNA/CAA: Rakan Turcios CRNA    Indications and Patient Condition  Indications for airway management: airway protection    Preoxygenated: yes    Mask difficulty assessment: 0 - not attempted    Final Airway Details    Final airway type: supraglottic airway      Successful airway: classic  Size: 3   Number of attempts at approach: 1  Assessment: lips, teeth, and gum same as pre-op and atraumatic intubation    Additional Comments  Airway pressure leak at <20cm/h20

## 2025-04-29 NOTE — ANESTHESIA POSTPROCEDURE EVALUATION
Patient: Ruthann Morales    Procedure Summary       Date: 04/29/25 Room / Location:  [facility] /  [facility]    Anesthesia Start: 1028 Anesthesia Stop: 1240    Procedure: LEFT BREAST LUMPECTOMY WITH SENTINEL NODE BIOPSY AND NEEDLE LOCALIZATION (Left: Breast) Diagnosis:       Invasive ductal carcinoma of breast, female, left      (Invasive ductal carcinoma of breast, female, left [C50.912])    Surgeons: Tiesha Gunter MD Provider: Rakan Turcios CRNA    Anesthesia Type: MAC, general ASA Status: 3            Anesthesia Type: MAC, general    Vitals  Vitals Value Taken Time   BP     Temp     Pulse 73 04/29/25 12:40   Resp     SpO2 95 % 04/29/25 12:40   Vitals shown include unfiled device data.        Post Anesthesia Care and Evaluation    Patient location during evaluation: bedside  Patient participation: complete - patient participated  Level of consciousness: awake  Pain score: 0  Pain management: adequate    Airway patency: patent  Anesthetic complications: No anesthetic complications  PONV Status: controlled  Cardiovascular status: acceptable and stable  Respiratory status: acceptable and room air  Hydration status: acceptable    Comments: See nursing documentation for post op vital signs

## 2025-05-02 LAB — REF LAB TEST METHOD: NORMAL

## 2025-05-05 RX ORDER — PROPRANOLOL HYDROCHLORIDE 10 MG/1
10 TABLET ORAL 2 TIMES DAILY
Qty: 60 TABLET | Refills: 0 | Status: SHIPPED | OUTPATIENT
Start: 2025-05-05

## 2025-05-05 NOTE — PROGRESS NOTES
"Subjective   Ruthann Morales is a 64 y.o. female.   Chief Complaint   Patient presents with    Post-op Follow-up     Lumpectomy          History of Present Illness     Ruthann returns to the office today for routine follow-up after undergoing a left breast needle localized lumpectomy and sentinel node biopsy.  Fortunately, both of the excised sentinel nodes were benign.  The lumpectomy specimen revealed both invasive ductal carcinoma, Asbury grade 1, pT1c, with adjacent ductal carcinoma in situ which was noted to be low-grade.  Margins were all negative for DCIS.  However, invasive carcinoma was focally present at the superior cauterized margin.  The additional medial margin was completely negative.  She reports that she is doing well overall.  She is anxious to discuss her pathology results.        The following portions of the patient's history were reviewed and updated as appropriate: allergies, current medications, past family history, past medical history, past social history, past surgical history, and problem list.    Patient Active Problem List   Diagnosis    Abdominal pain    Hypothyroidism    Chronic fatigue    Pure hypercholesterolemia    Rash    Leg cramps    Right foot pain    Elevated glucose    Arthralgia of right knee    Essential hypertension    Vitamin D deficiency    Invasive ductal carcinoma of breast, female, left       Past Medical History:   Diagnosis Date    Abdominal hernia     Anxiety     Arthritis     \"maybe\"    Breast cancer     Breast lump     left    Disease of thyroid gland     Exposure to TB     30 years ago    Hypertension     Hypothyroidism     Invasive ductal carcinoma of breast, female, left     Irreducible incisional hernia 06/16/2016    PONV (postoperative nausea and vomiting)     Vitamin D deficiency 05/16/2023       Past Surgical History:   Procedure Laterality Date    APPENDECTOMY      BREAST BIOPSY Left     BREAST LUMPECTOMY WITH SENTINEL NODE BIOPSY Left 04/29/2025    " Procedure: LEFT BREAST LUMPECTOMY WITH SENTINEL NODE BIOPSY AND NEEDLE LOCALIZATION;  Surgeon: Tiesha Gunter MD;  Location: [facility];  Service: General;  Laterality: Left;     SECTION      two    HERNIA REPAIR      HERNIA REPAIR      JOINT REPLACEMENT  10-24-23    Can’t remember exact michelle    KNEE ARTHROSCOPY W/ MENISCAL REPAIR Right     LYMPH NODE BIOPSY  24    TOTAL KNEE ARTHROPLASTY Right     UMBILICAL HERNIA REPAIR         Medications:   No current facility-administered medications on file prior to visit.     Current Outpatient Medications on File Prior to Visit   Medication Sig    Cholecalciferol (VITAMIN D) 1000 UNITS tablet Take 2 tablets by mouth Daily.    propranolol (INDERAL) 10 MG tablet Take 1 tablet by mouth twice daily    Synthroid 100 MCG tablet TAKE 1 TABLET DAILY         Allergies:   Allergies   Allergen Reactions    Sulfa Antibiotics Rash         Family History   Problem Relation Age of Onset    Lymphoma Mother     Breast cancer Mother 57    Cancer Mother     Multiple myeloma Father     Cancer Father     Diabetes Maternal Grandfather         Diabetic    Diabetes Paternal Grandmother         Diabetic    Breast cancer Maternal Aunt 54    Stroke Other     Diabetes Other     Heart attack Other        Social History     Socioeconomic History    Marital status:    Tobacco Use    Smoking status: Never    Smokeless tobacco: Never   Vaping Use    Vaping status: Never Used   Substance and Sexual Activity    Alcohol use: No    Drug use: No    Sexual activity: Defer       Review of Systems   Constitutional:  Negative for chills, fever and unexpected weight loss.   HENT:  Negative for hearing loss, trouble swallowing and voice change.    Eyes:  Negative for visual disturbance.   Respiratory:  Negative for apnea, cough, chest tightness, shortness of breath and wheezing.    Cardiovascular:  Negative for chest pain, palpitations and leg swelling.   Gastrointestinal:  Negative  "for abdominal distention, abdominal pain, anal bleeding, blood in stool, constipation, diarrhea, nausea, rectal pain, vomiting, GERD and indigestion.   Endocrine: Negative for cold intolerance and heat intolerance.   Genitourinary:  Negative for difficulty urinating, dysuria and flank pain.   Musculoskeletal:  Negative for back pain and gait problem.   Skin:  Positive for wound. Negative for color change, rash and skin lesions.   Neurological:  Negative for dizziness, syncope, speech difficulty, weakness, light-headedness and numbness.   Hematological:  Negative for adenopathy. Does not bruise/bleed easily.   Psychiatric/Behavioral:  Negative for depressed mood. The patient is not nervous/anxious.        Objective   /80   Pulse 71   Temp 97.3 °F (36.3 °C) (Infrared)   Ht 165.1 cm (65\")   Wt 101 kg (222 lb)   SpO2 97%   BMI 36.94 kg/m²     Physical Exam  Constitutional:       Appearance: She is well-developed.   HENT:      Head: Normocephalic and atraumatic.   Eyes:      General: No scleral icterus.  Cardiovascular:      Rate and Rhythm: Regular rhythm.   Pulmonary:      Effort: Pulmonary effort is normal.   Chest:      Comments: Left breast and axillary wounds healing appropriately.  Abdominal:      General: There is no distension.      Palpations: Abdomen is soft.      Tenderness: There is no abdominal tenderness.   Skin:     General: Skin is warm and dry.   Neurological:      Mental Status: She is alert and oriented to person, place, and time.   Psychiatric:         Behavior: Behavior normal.       Reference Lab Report [facility]  [address] [place] [address]  Phone: [phone] or [phone]  Fax: [phone]  Milton Fair M.D., Medical Director    PATIENT NAME                           LABORATORY NO.  [id number]  AUDRA OTT.                  [id number]  [id number]                         AGE              SEX  SSN           CLIENT REF #  [facility] " [place] [address]      1960      [id number]   [id number]    [address] [place] [address] BY-PASS                REQUESTING M.SEAN.     ATTENDING M.D.     COPY TO.  [address]                        [place], [place] [address]                 DATE COLLECTED      DATE RECEIVED      DATE REPORTED  04/29/2025 04/30/2025 05/02/2025    DIAGNOSIS:  A.   BREAST, LUMPECTOMY, LEFT:  Invasive ductal carcinoma, Andreea grade 1 (of 3), pT1c, see  checklist and comment  Ductal carcinoma in situ, low nuclear grade, solid, cribriform, and  comedo pattern  Invasive carcinoma is present at the superior cauterized margin  Margin is negative for DCIS  B.   SENTINEL NODE, #1:  One benign lymph node (0/1)  C.   SENTINEL NODE, #2:  One benign lymph node (0/1)  D.   BREAST MARGIN, ADDITIONAL MEDIAL MARGIN:  Benign breast tissue with columnar cell change and usual ductal hyperplasia  Negative for atypia or malignancy  Additional medial margin negative    AVH    COMMENT:  p63 (x2), p40, calponin, and smooth muscle myosin heavy chain  (x2) are performed on blocks A2, A3, and A4, and show loss of myoepithelial  cells in areas of invasive carcinoma, including at the focus of tumor located at  the superior inked/cauterized margin in specimen A, with retention of  myoepithelial cells in areas of DCIS.  All stains are performed with adequate  control.    BREAST BIOMARKER REPORTING TEMPLATE performed on block A4:  Estrogen Receptor (ER) Status: Positive, >95%, average intensity: 3+  Progesterone Receptor (PgR) Status: Positive, 95%, average intensity: 3+  HER2 by Immunohistochemistry (IHC) Status: Negative (Score 1+)    The specimen meets requirements specified in the latest version of ASCO/CAP  guidelines. Estrogen receptor (St. Edward clone SP1, polymer, IVD), progesterone  receptor (St. Edward clone IE2, polymer, IVD), and HER-2/carlo oncoprotein  (St. Edward clone 4B5, polymer, IVD) are performed on buffered  formalin fixed,  paraffin embedded tissue, performed according to FDA approved protocol,  interpreted by the 2018 ASCO/CAP guidelines, and reported by the 2025 CAP  template. Variables that can affect the reliability of the assays include cold  ischemia time prior to fixation and total fixation time outside of ASCO/CAP  guidelines, processing with decalcification agents, and fixatives other than 10%  neutral buffered formalin.    INVASIVE CARCINOMA OF THE BREAST    SPECIMEN:  Procedure: Excision (less than total mastectomy)  Specimen Laterality: Left    TUMOR:  Histologic Type: Invasive carcinoma of no special type (ductal)  Histologic Grade (Delancey Histologic Score):  Glandular (Acinar) / Tubular Differentiation: Score 1  Nuclear Pleomorphism: Score 2  Mitotic Rate: Score 1  Overall Grade: Grade 1 (scores of 3, 4 or 5)  Tumor Size: 12 mm  Ductal Carcinoma In Situ (DCIS): Present  Lymphovascular Invasion: Not identified  Treatment Effect in the Breast: No known presurgical therapy    MARGINS:  Margin Status for Invasive Carcinoma:  - Invasive carcinoma present at margin  Margin status is listed as -positive- if there is ink on invasive carcinoma (ie,  the distance is 0 mm). Extent of margin involvement may be specified as  unifocal, multifocal, or extensive.  Margin(s) Involved by Invasive Carcinoma: Superior Unifocal  Margin Status for DCIS:  - All margins negative for DCIS  Distance from DCIS to Closest Margin: 2 mm  Closest Margin(s) to DCIS: Superior    REGIONAL LYMPH NODES:  Regional Lymph Node Status:  All regional lymph nodes negative for tumor  Total Number of Lymph Nodes Examined (sentinel and non-sentinel): 2  Number of Luna Nodes Examined: 2    PATHOLOGIC STAGE CLASSIFICATION (pTNM, AJCC 8th Edition):  Reporting of pT, pN, and (when applicable) pM categories is based on  information available to the pathologist at the time the report is issued. As per  the AJCC (Chapter 1, 8th Ed.) it is the  "managing physician-s responsibility to  establish the final pathologic stage based upon all pertinent information,  including but potentially not limited to this pathology report.  pT Category: pT1c  Regional Lymph Nodes Modifier: (sn): Alden node(s) evaluated.  pN Category: pN0    CLINICAL HISTORY:  Invasive ductal carcinoma of breast, female, left      SPECIMENS RECEIVED:  A.  BREAST, LUMPECTOMY , LEFT  B.  SENTINEL NODE , #1  C.  SENTINEL NODE , #2  D.  BREAST MARGIN , ADDITIONAL MEDIAL MARGIN    MICROSCOPIC DESCRIPTION:  Tissue blocks are prepared and slides are examined microscopically on all  specimens. See diagnosis for details.    The internal and external (both positive and negative) controls reacted  appropriately. Some of our immunohistochemical and in situ hybridization  studies are performed as analyte specific reagents. The following statement  applies to those tests: This test was developed, and its performance  characteristics determined by [facility]. It has not been  cleared or approved by the US Food and Drug Administration. However, the  FDA has determined that approval and clearance are not necessary.    Professional interpretation rendered by Rebecca Beckford D.O. at [facility] [employer], [address] [place] [address].    PREVIOUS PATIENT HISTORY IN FILES:  TRURIOESTRELLITA HERNANDEZ (1960 F)    [id number], Date Collected: 03/27/2025  BREAST, BIOPSY, NEEDLE CORES, LEFT  Focus of microinvasive ductal carcinoma (approximately 1 mm) arising from  within a background of low-grade ductal carcinoma in situ (DCIS) with  associated microcalcifications  Vessels with medial calcific atherosclerosis  ER: Positive  ME: Positive  HER2/carlo: Negative    GROSS DESCRIPTION:  A.  Received in formalin labeled \"left breast mass short stitches superior long  stitches lateral\" is an oriented portion of tan-yellow, lobulated fatty tissue  that contains 2 needle localizing wires and measures " "73.2 g, 8.4 cm from  medial to lateral, 7.0 cm from superior to inferior, and 3.4 cm from anterior  to posterior.  The anterior surface also displays a 3.2 x 0.7 cm tan-white,  wrinkled, grossly unremarkable skin fragment.  A short suture designates  superior, and a long suture designates lateral.  The specimen is sectioned  from medial to lateral into 16 slices to reveal a 1.1 x 1.1 x 0.9 cm tan-  white, firm, ill-defined nodule in slices 4-6 that contains a possible biopsy  cavity with no distinct biopsy clip grossly identified.  The nodule is 0.1 cm  from the closest superior margin, 1.8 cm from the closest inferior margin,  0.3 cm from the closest posterior margin, 1.4 cm from the closest anterior  margin, 1.5 cm from the closest medial margin, and greater than 5 cm from  the closest lateral margin and overlying skin.  The remainder of the cut  surfaces are tan-white to yellow and focally fibrotic with no additional  lesions or nodules grossly identified.  The specimen is composed of  approximately 95% adipose tissue and 5% fibrous tissue, and  representative sections are submitted as follows:  A1: Medial margin, perpendicular  A2: Nodule from slice #4 to include the superior and posterior margins,  perpendicular  A3: Nodule from slice #5 to include the superior and anterior margins,  perpendicular  A4: Fullface of nodule from slice #6 to include the posterior margin  A5: Inferior margin, perpendicular  A6: Skin and lateral margin, perpendicular  Cold ischemic time: 42 minutes  Formalin fixation time: Between 6 and 72 hours  B.  Received in formalin labeled \"sentinel node number 1\" is a 1.3 x 1.2 x 0.8  cm tan possible lymph node with attached adipose tissue.  The lymph node  is serially sectioned and submitted entirely in cassette B1.  C.  Received in formalin labeled \"sentinel node number 2\" is a 0.9 x 0.6 x 0.4  cm tan-gray possible lymph node with attached adipose tissue.  The lymph  node is serially " "sectioned and submitted entirely in cassette C1.  D.  Received in formalin labeled \"additional medial margin short stitch  superior long stitch\" is an oriented portion of tan-yellow, lobulated fatty  tissue that measures 14.0 g, 5.8 x 3.4 x 2.9 cm.  A short stitch designates  superior, and a long stitch designates medial.  The presumed new margin is  inked blue, and the remainder of the specimen is inked black.  Sectioning  from superior to inferior reveals tan-white to yellow, focally fibrotic cut  surfaces with no distinct lesions or masses grossly identified.  The  specimen is composed of approximately 95% adipose tissue and 5%  fibrous tissue, and the specimen is sequentially submitted from superior to  inferior in its entirety in cassettes D1-D15.  Cold ischemic time: Less than 1 hour  Formalin fixation time: Between 6 and 72 hours  [facility]    REVIEWED, DIAGNOSED AND ELECTRONICALLY  SIGNED BY:    Rebecca Beckford D.O.  CPT CODES:  88307x4, 32668, 88341x5, 88360x3       Assessment & Plan   Diagnoses and all orders for this visit:    1. Invasive ductal carcinoma of breast, female, left (Primary)  -     Ambulatory Referral to Oncology  -     Case Request; Standing  -     sodium chloride 0.9 % flush 10 mL  -     sodium chloride 0.9 % flush 10 mL  -     sodium chloride 0.9 % infusion 40 mL  -     lactated ringers infusion  -     heparin (porcine) 5000 UNIT/ML injection 5,000 Units  -     ceFAZolin (ANCEF) 2,000 mg in sodium chloride 0.9 % 100 mL IVPB  -     Case Request    Other orders  -     Follow Anesthesia Guidelines / Protocol; Future  -     Follow Anesthesia Guidelines / Protocol; Standing  -     Verify / Perform Chlorhexidine Skin Prep; Standing  -     Provide NPO Instructions to Patient; Future  -     Chlorhexidine Skin Prep; Future  -     Insert Peripheral IV; Standing  -     Saline Lock & Maintain IV Access; Standing  -     Place Sequential Compression Device; Standing  -     Maintain Sequential Compression " Device; Standing          Overall, Ruthann is recovering well after her recent lumpectomy with sentinel node biopsy.  We discussed her pathology results in detail which are overall reassuring.  We did discuss her positive superior margin which will need to be reexcised.  We discussed the alternative option of mastectomy, which certainly is not medically necessary at the present time.  I discussed with the patient that I would typically offer reexcision of margins x 2 to achieve margin clearance, if this is unsuccessful after to return to the OR, then I do generally recommend mastectomy.  Certainly, initial reexcision is the usual management.  This can easily be performed under sedation.  We discussed the risks, benefits, and alternatives to the procedure to reexcised the positive margin including risk of further positive margins requiring additional procedures, bleeding, infection, chronic pain, and the risk related to anesthesia.  She understands, and wishes to proceed as discussed.  We will also go ahead and refer her to medical oncology, Dr. Riley for consultation.  She understands that she will be contacted by his office for scheduling purposes.

## 2025-05-05 NOTE — H&P (VIEW-ONLY)
"Subjective   Ruthann Morales is a 64 y.o. female.   Chief Complaint   Patient presents with    Post-op Follow-up     Lumpectomy          History of Present Illness     Ruthann returns to the office today for routine follow-up after undergoing a left breast needle localized lumpectomy and sentinel node biopsy.  Fortunately, both of the excised sentinel nodes were benign.  The lumpectomy specimen revealed both invasive ductal carcinoma, [place] grade 1, pT1c, with adjacent ductal carcinoma in situ which was noted to be low-grade.  Margins were all negative for DCIS.  However, invasive carcinoma was focally present at the superior cauterized margin.  The additional medial margin was completely negative.  She reports that she is doing well overall.  She is anxious to discuss her pathology results.        The following portions of the patient's history were reviewed and updated as appropriate: allergies, current medications, past family history, past medical history, past social history, past surgical history, and problem list.    Patient Active Problem List   Diagnosis    Abdominal pain    Hypothyroidism    Chronic fatigue    Pure hypercholesterolemia    Rash    Leg cramps    Right foot pain    Elevated glucose    Arthralgia of right knee    Essential hypertension    Vitamin D deficiency    Invasive ductal carcinoma of breast, female, left       Past Medical History:   Diagnosis Date    Abdominal hernia     Anxiety     Arthritis     \"maybe\"    Breast cancer     Breast lump     left    Disease of thyroid gland     Exposure to TB     30 years ago    Hypertension     Hypothyroidism     Invasive ductal carcinoma of breast, female, left     Irreducible incisional hernia 06/16/2016    PONV (postoperative nausea and vomiting)     Vitamin D deficiency 05/16/2023       Past Surgical History:   Procedure Laterality Date    APPENDECTOMY      BREAST BIOPSY Left     BREAST LUMPECTOMY WITH SENTINEL NODE BIOPSY Left 04/29/2025    " Procedure: LEFT BREAST LUMPECTOMY WITH SENTINEL NODE BIOPSY AND NEEDLE LOCALIZATION;  Surgeon: Tiesha Gunter MD;  Location: [facility];  Service: General;  Laterality: Left;     SECTION      two    HERNIA REPAIR      HERNIA REPAIR      JOINT REPLACEMENT  10-24-23    Can’t remember exact michelle    KNEE ARTHROSCOPY W/ MENISCAL REPAIR Right     LYMPH NODE BIOPSY  24    TOTAL KNEE ARTHROPLASTY Right     UMBILICAL HERNIA REPAIR         Medications:   No current facility-administered medications on file prior to visit.     Current Outpatient Medications on File Prior to Visit   Medication Sig    Cholecalciferol (VITAMIN D) 1000 UNITS tablet Take 2 tablets by mouth Daily.    propranolol (INDERAL) 10 MG tablet Take 1 tablet by mouth twice daily    Synthroid 100 MCG tablet TAKE 1 TABLET DAILY         Allergies:   Allergies   Allergen Reactions    Sulfa Antibiotics Rash         Family History   Problem Relation Age of Onset    Lymphoma Mother     Breast cancer Mother 57    Cancer Mother     Multiple myeloma Father     Cancer Father     Diabetes Maternal Grandfather         Diabetic    Diabetes Paternal Grandmother         Diabetic    Breast cancer Maternal Aunt 54    Stroke Other     Diabetes Other     Heart attack Other        Social History     Socioeconomic History    Marital status:    Tobacco Use    Smoking status: Never    Smokeless tobacco: Never   Vaping Use    Vaping status: Never Used   Substance and Sexual Activity    Alcohol use: No    Drug use: No    Sexual activity: Defer       Review of Systems   Constitutional:  Negative for chills, fever and unexpected weight loss.   HENT:  Negative for hearing loss, trouble swallowing and voice change.    Eyes:  Negative for visual disturbance.   Respiratory:  Negative for apnea, cough, chest tightness, shortness of breath and wheezing.    Cardiovascular:  Negative for chest pain, palpitations and leg swelling.   Gastrointestinal:  Negative  "for abdominal distention, abdominal pain, anal bleeding, blood in stool, constipation, diarrhea, nausea, rectal pain, vomiting, GERD and indigestion.   Endocrine: Negative for cold intolerance and heat intolerance.   Genitourinary:  Negative for difficulty urinating, dysuria and flank pain.   Musculoskeletal:  Negative for back pain and gait problem.   Skin:  Positive for wound. Negative for color change, rash and skin lesions.   Neurological:  Negative for dizziness, syncope, speech difficulty, weakness, light-headedness and numbness.   Hematological:  Negative for adenopathy. Does not bruise/bleed easily.   Psychiatric/Behavioral:  Negative for depressed mood. The patient is not nervous/anxious.        Objective   /80   Pulse 71   Temp 97.3 °F (36.3 °C) (Infrared)   Ht 165.1 cm (65\")   Wt 101 kg (222 lb)   SpO2 97%   BMI 36.94 kg/m²     Physical Exam  Constitutional:       Appearance: She is well-developed.   HENT:      Head: Normocephalic and atraumatic.   Eyes:      General: No scleral icterus.  Cardiovascular:      Rate and Rhythm: Regular rhythm.   Pulmonary:      Effort: Pulmonary effort is normal.   Chest:      Comments: Left breast and axillary wounds healing appropriately.  Abdominal:      General: There is no distension.      Palpations: Abdomen is soft.      Tenderness: There is no abdominal tenderness.   Skin:     General: Skin is warm and dry.   Neurological:      Mental Status: She is alert and oriented to person, place, and time.   Psychiatric:         Behavior: Behavior normal.       Reference Lab Report [facility]  [address] [place] [address]  Phone: [phone] or [phone]  Fax: [phone]  Milton Fair M.D., Medical Director    PATIENT NAME                           LABORATORY NO.  [id number]  AUDRA OTT.                  [id number]  [id number]                         AGE              SEX  SSN           CLIENT REF #  [facility] " [place] [address]      1960      [id number]   [id number]    [address] [place] [address] BY-PASS                REQUESTING M.SEAN.     ATTENDING M.D.     COPY TO.  [address]                        [place], [place] [address]                 DATE COLLECTED      DATE RECEIVED      DATE REPORTED  04/29/2025 04/30/2025 05/02/2025    DIAGNOSIS:  A.   BREAST, LUMPECTOMY, LEFT:  Invasive ductal carcinoma, [place] grade 1 (of 3), pT1c, see  checklist and comment  Ductal carcinoma in situ, low nuclear grade, solid, cribriform, and  comedo pattern  Invasive carcinoma is present at the superior cauterized margin  Margin is negative for DCIS  B.   SENTINEL NODE, #1:  One benign lymph node (0/1)  C.   SENTINEL NODE, #2:  One benign lymph node (0/1)  D.   BREAST MARGIN, ADDITIONAL MEDIAL MARGIN:  Benign breast tissue with columnar cell change and usual ductal hyperplasia  Negative for atypia or malignancy  Additional medial margin negative    AVH    COMMENT:  p63 (x2), p40, calponin, and smooth muscle myosin heavy chain  (x2) are performed on blocks A2, A3, and A4, and show loss of myoepithelial  cells in areas of invasive carcinoma, including at the focus of tumor located at  the superior inked/cauterized margin in specimen A, with retention of  myoepithelial cells in areas of DCIS.  All stains are performed with adequate  control.    BREAST BIOMARKER REPORTING TEMPLATE performed on block A4:  Estrogen Receptor (ER) Status: Positive, >95%, average intensity: 3+  Progesterone Receptor (PgR) Status: Positive, 95%, average intensity: 3+  HER2 by Immunohistochemistry (IHC) Status: Negative (Score 1+)    The specimen meets requirements specified in the latest version of ASCO/CAP  guidelines. Estrogen receptor ([place] clone SP1, polymer, IVD), progesterone  receptor ([place] clone IE2, polymer, IVD), and HER-2/carlo oncoprotein  ([place] clone 4B5, polymer, IVD) are performed on buffered  formalin fixed,  paraffin embedded tissue, performed according to FDA approved protocol,  interpreted by the 2018 ASCO/CAP guidelines, and reported by the 2025 CAP  template. Variables that can affect the reliability of the assays include cold  ischemia time prior to fixation and total fixation time outside of ASCO/CAP  guidelines, processing with decalcification agents, and fixatives other than 10%  neutral buffered formalin.    INVASIVE CARCINOMA OF THE BREAST    SPECIMEN:  Procedure: Excision (less than total mastectomy)  Specimen Laterality: Left    TUMOR:  Histologic Type: Invasive carcinoma of no special type (ductal)  Histologic Grade (Wiley Histologic Score):  Glandular (Acinar) / Tubular Differentiation: Score 1  Nuclear Pleomorphism: Score 2  Mitotic Rate: Score 1  Overall Grade: Grade 1 (scores of 3, 4 or 5)  Tumor Size: 12 mm  Ductal Carcinoma In Situ (DCIS): Present  Lymphovascular Invasion: Not identified  Treatment Effect in the Breast: No known presurgical therapy    MARGINS:  Margin Status for Invasive Carcinoma:  - Invasive carcinoma present at margin  Margin status is listed as -positive- if there is ink on invasive carcinoma (ie,  the distance is 0 mm). Extent of margin involvement may be specified as  unifocal, multifocal, or extensive.  Margin(s) Involved by Invasive Carcinoma: Superior Unifocal  Margin Status for DCIS:  - All margins negative for DCIS  Distance from DCIS to Closest Margin: 2 mm  Closest Margin(s) to DCIS: Superior    REGIONAL LYMPH NODES:  Regional Lymph Node Status:  All regional lymph nodes negative for tumor  Total Number of Lymph Nodes Examined (sentinel and non-sentinel): 2  Number of [place] Nodes Examined: 2    PATHOLOGIC STAGE CLASSIFICATION (pTNM, AJCC 8th Edition):  Reporting of pT, pN, and (when applicable) pM categories is based on  information available to the pathologist at the time the report is issued. As per  the AJCC (Chapter 1, 8th Ed.) it is the  "managing physician-s responsibility to  establish the final pathologic stage based upon all pertinent information,  including but potentially not limited to this pathology report.  pT Category: pT1c  Regional Lymph Nodes Modifier: (sn): [place] node(s) evaluated.  pN Category: pN0    CLINICAL HISTORY:  Invasive ductal carcinoma of breast, female, left      SPECIMENS RECEIVED:  A.  BREAST, LUMPECTOMY , LEFT  B.  SENTINEL NODE , #1  C.  SENTINEL NODE , #2  D.  BREAST MARGIN , ADDITIONAL MEDIAL MARGIN    MICROSCOPIC DESCRIPTION:  Tissue blocks are prepared and slides are examined microscopically on all  specimens. See diagnosis for details.    The internal and external (both positive and negative) controls reacted  appropriately. Some of our immunohistochemical and in situ hybridization  studies are performed as analyte specific reagents. The following statement  applies to those tests: This test was developed, and its performance  characteristics determined by [facility]. It has not been  cleared or approved by the US Food and Drug Administration. However, the  FDA has determined that approval and clearance are not necessary.    Professional interpretation rendered by Rebecca Beckford D.O. at [employer], [email or web address], [address] [place] [address].    PREVIOUS PATIENT HISTORY IN FILES:  TRURIOESTRELLITA HERNANDEZ (1960 F)    [id number], Date Collected: 03/27/2025  BREAST, BIOPSY, NEEDLE CORES, LEFT  Focus of microinvasive ductal carcinoma (approximately 1 mm) arising from  within a background of low-grade ductal carcinoma in situ (DCIS) with  associated microcalcifications  Vessels with medial calcific atherosclerosis  ER: Positive  [place]: Positive  HER2/carlo: Negative    GROSS DESCRIPTION:  A.  Received in formalin labeled \"left breast mass short stitches superior long  stitches lateral\" is an oriented portion of tan-yellow, lobulated fatty tissue  that contains 2 needle localizing wires and measures " "73.2 g, 8.4 cm from  medial to lateral, 7.0 cm from superior to inferior, and 3.4 cm from anterior  to posterior.  The anterior surface also displays a 3.2 x 0.7 cm tan-white,  wrinkled, grossly unremarkable skin fragment.  A short suture designates  superior, and a long suture designates lateral.  The specimen is sectioned  from medial to lateral into 16 slices to reveal a 1.1 x 1.1 x 0.9 cm tan-  white, firm, ill-defined nodule in slices 4-6 that contains a possible biopsy  cavity with no distinct biopsy clip grossly identified.  The nodule is 0.1 cm  from the closest superior margin, 1.8 cm from the closest inferior margin,  0.3 cm from the closest posterior margin, 1.4 cm from the closest anterior  margin, 1.5 cm from the closest medial margin, and greater than 5 cm from  the closest lateral margin and overlying skin.  The remainder of the cut  surfaces are tan-white to yellow and focally fibrotic with no additional  lesions or nodules grossly identified.  The specimen is composed of  approximately 95% adipose tissue and 5% fibrous tissue, and  representative sections are submitted as follows:  A1: Medial margin, perpendicular  A2: Nodule from slice #4 to include the superior and posterior margins,  perpendicular  A3: Nodule from slice #5 to include the superior and anterior margins,  perpendicular  A4: Fullface of nodule from slice #6 to include the posterior margin  A5: Inferior margin, perpendicular  A6: Skin and lateral margin, perpendicular  Cold ischemic time: 42 minutes  Formalin fixation time: Between 6 and 72 hours  B.  Received in formalin labeled \"sentinel node number 1\" is a 1.3 x 1.2 x 0.8  cm tan possible lymph node with attached adipose tissue.  The lymph node  is serially sectioned and submitted entirely in cassette B1.  C.  Received in formalin labeled \"sentinel node number 2\" is a 0.9 x 0.6 x 0.4  cm tan-gray possible lymph node with attached adipose tissue.  The lymph  node is serially " "sectioned and submitted entirely in cassette C1.  D.  Received in formalin labeled \"additional medial margin short stitch  superior long stitch\" is an oriented portion of tan-yellow, lobulated fatty  tissue that measures 14.0 g, 5.8 x 3.4 x 2.9 cm.  A short stitch designates  superior, and a long stitch designates medial.  The presumed new margin is  inked blue, and the remainder of the specimen is inked black.  Sectioning  from superior to inferior reveals tan-white to yellow, focally fibrotic cut  surfaces with no distinct lesions or masses grossly identified.  The  specimen is composed of approximately 95% adipose tissue and 5%  fibrous tissue, and the specimen is sequentially submitted from superior to  inferior in its entirety in cassettes D1-D15.  Cold ischemic time: Less than 1 hour  Formalin fixation time: Between 6 and 72 hours  [facility]    REVIEWED, DIAGNOSED AND ELECTRONICALLY  SIGNED BY:    Rebecca Beckford D.O.  CPT CODES:  88307x4, 58484, 88341x5, 88360x3       Assessment & Plan   Diagnoses and all orders for this visit:    1. Invasive ductal carcinoma of breast, female, left (Primary)  -     Ambulatory Referral to Oncology  -     Case Request; Standing  -     sodium chloride 0.9 % flush 10 mL  -     sodium chloride 0.9 % flush 10 mL  -     sodium chloride 0.9 % infusion 40 mL  -     lactated ringers infusion  -     heparin (porcine) 5000 UNIT/ML injection 5,000 Units  -     ceFAZolin (ANCEF) 2,000 mg in sodium chloride 0.9 % 100 mL IVPB  -     Case Request    Other orders  -     Follow Anesthesia Guidelines / Protocol; Future  -     Follow Anesthesia Guidelines / Protocol; Standing  -     Verify / Perform Chlorhexidine Skin Prep; Standing  -     Provide NPO Instructions to Patient; Future  -     Chlorhexidine Skin Prep; Future  -     Insert Peripheral IV; Standing  -     Saline Lock & Maintain IV Access; Standing  -     Place Sequential Compression Device; Standing  -     Maintain Sequential Compression " Device; Standing          Overall, Ruthann is recovering well after her recent lumpectomy with sentinel node biopsy.  We discussed her pathology results in detail which are overall reassuring.  We did discuss her positive superior margin which will need to be reexcised.  We discussed the alternative option of mastectomy, which certainly is not medically necessary at the present time.  I discussed with the patient that I would typically offer reexcision of margins x 2 to achieve margin clearance, if this is unsuccessful after to return to the OR, then I do generally recommend mastectomy.  Certainly, initial reexcision is the usual management.  This can easily be performed under sedation.  We discussed the risks, benefits, and alternatives to the procedure to reexcised the positive margin including risk of further positive margins requiring additional procedures, bleeding, infection, chronic pain, and the risk related to anesthesia.  She understands, and wishes to proceed as discussed.  We will also go ahead and refer her to medical oncology, Dr. Riley for consultation.  She understands that she will be contacted by his office for scheduling purposes.

## 2025-05-08 ENCOUNTER — OFFICE VISIT (OUTPATIENT)
Dept: SURGERY | Facility: CLINIC | Age: 65
End: 2025-05-08
Payer: MEDICARE

## 2025-05-08 VITALS
OXYGEN SATURATION: 97 % | TEMPERATURE: 97.3 F | BODY MASS INDEX: 36.99 KG/M2 | HEART RATE: 71 BPM | WEIGHT: 222 LBS | HEIGHT: 65 IN | SYSTOLIC BLOOD PRESSURE: 152 MMHG | DIASTOLIC BLOOD PRESSURE: 80 MMHG

## 2025-05-08 DIAGNOSIS — C50.912 INVASIVE DUCTAL CARCINOMA OF BREAST, FEMALE, LEFT: Primary | ICD-10-CM

## 2025-05-08 PROCEDURE — 3077F SYST BP >= 140 MM HG: CPT | Performed by: SURGERY

## 2025-05-08 PROCEDURE — 3079F DIAST BP 80-89 MM HG: CPT | Performed by: SURGERY

## 2025-05-08 PROCEDURE — 99024 POSTOP FOLLOW-UP VISIT: CPT | Performed by: SURGERY

## 2025-05-08 PROCEDURE — 1159F MED LIST DOCD IN RCRD: CPT | Performed by: SURGERY

## 2025-05-08 PROCEDURE — 1160F RVW MEDS BY RX/DR IN RCRD: CPT | Performed by: SURGERY

## 2025-05-08 RX ORDER — HEPARIN SODIUM 5000 [USP'U]/ML
5000 INJECTION, SOLUTION INTRAVENOUS; SUBCUTANEOUS ONCE
Status: CANCELLED | OUTPATIENT
Start: 2025-05-08 | End: 2025-05-08

## 2025-05-08 RX ORDER — SODIUM CHLORIDE, SODIUM LACTATE, POTASSIUM CHLORIDE, CALCIUM CHLORIDE 600; 310; 30; 20 MG/100ML; MG/100ML; MG/100ML; MG/100ML
50 INJECTION, SOLUTION INTRAVENOUS CONTINUOUS
Status: CANCELLED | OUTPATIENT
Start: 2025-05-08 | End: 2025-05-09

## 2025-05-08 RX ORDER — SODIUM CHLORIDE 9 MG/ML
40 INJECTION, SOLUTION INTRAVENOUS AS NEEDED
Status: CANCELLED | OUTPATIENT
Start: 2025-05-08

## 2025-05-08 RX ORDER — SODIUM CHLORIDE 0.9 % (FLUSH) 0.9 %
10 SYRINGE (ML) INJECTION AS NEEDED
Status: CANCELLED | OUTPATIENT
Start: 2025-05-08

## 2025-05-08 RX ORDER — SODIUM CHLORIDE 0.9 % (FLUSH) 0.9 %
10 SYRINGE (ML) INJECTION EVERY 12 HOURS SCHEDULED
Status: CANCELLED | OUTPATIENT
Start: 2025-05-08

## 2025-05-09 ENCOUNTER — OFFICE VISIT (OUTPATIENT)
Dept: INTERNAL MEDICINE | Facility: CLINIC | Age: 65
End: 2025-05-09
Payer: MEDICARE

## 2025-05-09 VITALS
WEIGHT: 230 LBS | SYSTOLIC BLOOD PRESSURE: 136 MMHG | OXYGEN SATURATION: 98 % | TEMPERATURE: 97.1 F | HEIGHT: 65 IN | DIASTOLIC BLOOD PRESSURE: 72 MMHG | BODY MASS INDEX: 38.32 KG/M2 | HEART RATE: 68 BPM

## 2025-05-09 DIAGNOSIS — C50.912 INVASIVE DUCTAL CARCINOMA OF BREAST, FEMALE, LEFT: ICD-10-CM

## 2025-05-09 DIAGNOSIS — F41.0 SEVERE ANXIETY WITH PANIC: Primary | ICD-10-CM

## 2025-05-09 RX ORDER — LORAZEPAM 0.5 MG/1
0.5 TABLET ORAL EVERY 8 HOURS PRN
Qty: 12 TABLET | Refills: 0 | Status: SHIPPED | OUTPATIENT
Start: 2025-05-09

## 2025-05-09 RX ORDER — FLUOXETINE 10 MG/1
10 CAPSULE ORAL DAILY
Qty: 90 CAPSULE | Refills: 1 | Status: SHIPPED | OUTPATIENT
Start: 2025-05-09

## 2025-05-09 NOTE — PRE-PROCEDURE INSTRUCTIONS
PAT phone history completed with patient for upcoming procedure on 5/12/25 with Dr. Gunter.    PAT PASS reviewed with patient and they verbalize understanding of the following:     Do not eat or drink anything after midnight the night before procedure unless otherwise instructed by physician/surgeon's office, this includes no gum, candy, mints, tobacco products or e-cigarettes.  Do not shave the area to be operated on at least 48 hours prior to procedure.  Do not wear makeup, lotion, hair products, or nail polish.  Do not wear any jewelry and remove all piercings.  Do not wear any adhesive if you wear dentures.  Do not wear contacts; bring in glasses if needed.  Only take medications on the morning of procedure as instructed by PAT nurse per anesthesia guidelines or as instructed by physician's office.  If you are on any blood thinners reach out to the physician/surgeon's office for instructions on when/if they will need to be stopped prior to procedure.  Bring in picture ID and insurance card, advanced directive copies if applicable, CPAP/BIPAP/Inhalers if indicated morning of procedure, leave any other valuables at home.  Ensure you have arranged for someone to drive you home the day of your procedure and someone to care for you at home afterwards. It is recommended that you do not drive, drink alcohol, or make any major legal decisions for at least 24 hours after your procedure is complete.  ERAS instructions given unless otherwise instructed per surgeon's orders.    Instructions given on hospital entrance and registration location.

## 2025-05-09 NOTE — PROGRESS NOTES
Chief Complaint   Patient presents with    Follow-up    Anxiety     Subjective   Ruthann Morales is a 64 y.o. female presents to follow up severe anxiety with panic and breast cancer.     History of Present Illness  Anxiety worsened after breast cancer diagnosis. She reports that BuSpar and hydroxyzine were ineffective in managing her symptoms. She is currently on propranolol prescribed by Dr. Gunter. This regimen appears to be beneficial; however, she experiences a resurgence of symptoms towards the end of the day, requiring another dose. Additionally, she is contemplating the need for a daily medication for her nerves but expresses concern about potential weight gain. She identifies as a stress eater and has previously tried injections for weight management, which she found unhelpful. Anxiety attacks characterized by shaking and a racing heart are alleviated by propranolol. She took propranolol approximately an hour prior to this visit. BP also elevated with anxiety. She has a history of anxiety attacks due to claustrophobia prior to diagnosis of breast cancer.     She is scheduled for a follow-up surgery on 05/12/2025 due to invasive ductal carcinoma of breast with Dr. Gunter, then will meet with oncology for potential radiation       The following portions of the patient's history were reviewed and updated as appropriate: allergies, current medications, past family history, past medical history, past social history, past surgical history and problem list.    Review of Systems   Constitutional:  Negative for chills, diaphoresis, fatigue and fever.   HENT:  Negative for congestion and sore throat.    Respiratory:  Negative for cough.    Cardiovascular:  Negative for chest pain.   Gastrointestinal:  Negative for abdominal pain, nausea and vomiting.   Genitourinary:  Negative for dysuria.   Musculoskeletal:  Negative for myalgias and neck pain.   Neurological:  Negative for weakness, numbness and headaches.  "  Psychiatric/Behavioral:  Positive for sleep disturbance. The patient is nervous/anxious.    All other systems reviewed and are negative.      Objective   /72   Pulse 68   Temp 97.1 °F (36.2 °C)   Ht 165.1 cm (65\")   Wt 104 kg (230 lb)   SpO2 98%   BMI 38.27 kg/m²   Body mass index is 38.27 kg/m².       Physical Exam  Vitals and nursing note reviewed.   Constitutional:       General: She is not in acute distress.     Appearance: Normal appearance.   Eyes:      Extraocular Movements: Extraocular movements intact.   Cardiovascular:      Rate and Rhythm: Normal rate and regular rhythm.   Pulmonary:      Effort: Pulmonary effort is normal.      Breath sounds: Normal breath sounds.   Musculoskeletal:         General: Normal range of motion.      Cervical back: Normal range of motion.   Skin:     General: Skin is dry.   Neurological:      Mental Status: She is alert and oriented to person, place, and time.   Psychiatric:         Attention and Perception: Attention and perception normal.         Mood and Affect: Mood is anxious.         Speech: Speech normal.         Behavior: Behavior normal.         Thought Content: Thought content normal.       Assessment & Plan   Ruthann Morales is here today and the following problems have been addressed:      Diagnoses and all orders for this visit:    1. Severe anxiety with panic (Primary)  -     LORazepam (Ativan) 0.5 MG tablet; Take 1 tablet by mouth Every 8 (Eight) Hours As Needed (Panic attack).  Dispense: 12 tablet; Refill: 0  -     FLUoxetine (PROzac) 10 MG capsule; Take 1 capsule by mouth Daily.  Dispense: 90 capsule; Refill: 1    2. Invasive ductal carcinoma of breast, female, left      Assessment & Plan  She has been having panic attacks; prescribe short-term course of ativan to use as needed for these severe anxious episodes. Discussion today regarding addictive nature of controlled substances and side effects of medication.   She is not ready to start a " daily medication; but we discussed if her anxiety remains uncontrolled to start Prozac 10 mg daily and this medication can take 4-6 weeks to be come effective but may help prevent panic attacks and help better mange her anxiety. Consider talk therapy as well.   Follow up Dr. Gunter, surgery scheduled 5/12/25 then will be referred to oncology     Follow Up   Return in about 3 months (around 8/9/2025) for Annual Physical.  Patient was given instructions and counseling regarding her condition or for health maintenance advice. Please see specific information pulled into the AVS if appropriate.     Ana PAINTING  [facility]      Patient or patient representative verbalized consent for the use of Ambient Listening during the visit with  MERT Zamora for chart documentation. 5/9/2025  12:41 EDT

## 2025-05-12 ENCOUNTER — HOSPITAL ENCOUNTER (OUTPATIENT)
Facility: HOSPITAL | Age: 65
Setting detail: HOSPITAL OUTPATIENT SURGERY
Discharge: HOME | End: 2025-05-12
Attending: SURGERY | Admitting: SURGERY
Payer: MEDICARE

## 2025-05-12 ENCOUNTER — ANESTHESIA EVENT (OUTPATIENT)
Dept: PERIOP | Facility: HOSPITAL | Age: 65
End: 2025-05-12
Payer: MEDICARE

## 2025-05-12 ENCOUNTER — ANESTHESIA (OUTPATIENT)
Dept: PERIOP | Facility: HOSPITAL | Age: 65
End: 2025-05-12
Payer: MEDICARE

## 2025-05-12 VITALS
RESPIRATION RATE: 16 BRPM | DIASTOLIC BLOOD PRESSURE: 71 MMHG | TEMPERATURE: 97.7 F | WEIGHT: 230 LBS | HEART RATE: 62 BPM | OXYGEN SATURATION: 100 % | HEIGHT: 65 IN | BODY MASS INDEX: 38.32 KG/M2 | SYSTOLIC BLOOD PRESSURE: 119 MMHG

## 2025-05-12 DIAGNOSIS — C50.912 INVASIVE DUCTAL CARCINOMA OF BREAST, FEMALE, LEFT: ICD-10-CM

## 2025-05-12 PROCEDURE — 25810000003 LACTATED RINGERS PER 1000 ML: Performed by: SURGERY

## 2025-05-12 PROCEDURE — 25010000002 CEFAZOLIN PER 500 MG: Performed by: SURGERY

## 2025-05-12 PROCEDURE — 88341 IMHCHEM/IMCYTCHM EA ADD ANTB: CPT

## 2025-05-12 PROCEDURE — 88342 IMHCHEM/IMCYTCHM 1ST ANTB: CPT

## 2025-05-12 PROCEDURE — 25010000002 FENTANYL CITRATE (PF) 50 MCG/ML SOLUTION: Performed by: NURSE ANESTHETIST, CERTIFIED REGISTERED

## 2025-05-12 PROCEDURE — 25010000002 DEXAMETHASONE PER 1 MG: Performed by: NURSE ANESTHETIST, CERTIFIED REGISTERED

## 2025-05-12 PROCEDURE — 25010000002 ONDANSETRON PER 1 MG: Performed by: NURSE ANESTHETIST, CERTIFIED REGISTERED

## 2025-05-12 PROCEDURE — 88305 TISSUE EXAM BY PATHOLOGIST: CPT

## 2025-05-12 PROCEDURE — 25010000002 HEPARIN (PORCINE) PER 1000 UNITS: Performed by: SURGERY

## 2025-05-12 PROCEDURE — 25010000002 BUPIVACAINE (PF) 0.25 % SOLUTION: Performed by: SURGERY

## 2025-05-12 PROCEDURE — 25010000002 PROPOFOL 10 MG/ML EMULSION: Performed by: NURSE ANESTHETIST, CERTIFIED REGISTERED

## 2025-05-12 PROCEDURE — 25010000002 PROPOFOL 200 MG/20ML EMULSION: Performed by: NURSE ANESTHETIST, CERTIFIED REGISTERED

## 2025-05-12 PROCEDURE — 25010000002 MIDAZOLAM PER 1MG: Performed by: NURSE ANESTHETIST, CERTIFIED REGISTERED

## 2025-05-12 PROCEDURE — 25010000002 LIDOCAINE 1 % SOLUTION: Performed by: SURGERY

## 2025-05-12 RX ORDER — SCOPOLAMINE 1 MG/3D
1 PATCH, EXTENDED RELEASE TRANSDERMAL ONCE
Status: DISCONTINUED | OUTPATIENT
Start: 2025-05-12 | End: 2025-05-12 | Stop reason: HOSPADM

## 2025-05-12 RX ORDER — MAGNESIUM HYDROXIDE 1200 MG/15ML
LIQUID ORAL AS NEEDED
Status: DISCONTINUED | OUTPATIENT
Start: 2025-05-12 | End: 2025-05-12 | Stop reason: HOSPADM

## 2025-05-12 RX ORDER — LIDOCAINE HYDROCHLORIDE 10 MG/ML
INJECTION, SOLUTION INFILTRATION; PERINEURAL AS NEEDED
Status: DISCONTINUED | OUTPATIENT
Start: 2025-05-12 | End: 2025-05-12 | Stop reason: HOSPADM

## 2025-05-12 RX ORDER — SODIUM CHLORIDE 0.9 % (FLUSH) 0.9 %
10 SYRINGE (ML) INJECTION EVERY 12 HOURS SCHEDULED
Status: DISCONTINUED | OUTPATIENT
Start: 2025-05-12 | End: 2025-05-12 | Stop reason: HOSPADM

## 2025-05-12 RX ORDER — HEPARIN SODIUM 5000 [USP'U]/ML
5000 INJECTION, SOLUTION INTRAVENOUS; SUBCUTANEOUS ONCE
Status: COMPLETED | OUTPATIENT
Start: 2025-05-12 | End: 2025-05-12

## 2025-05-12 RX ORDER — FENTANYL CITRATE 0.05 MG/ML
INJECTION, SOLUTION INTRAMUSCULAR; INTRAVENOUS AS NEEDED
Status: DISCONTINUED | OUTPATIENT
Start: 2025-05-12 | End: 2025-05-12 | Stop reason: SURG

## 2025-05-12 RX ORDER — BUPIVACAINE HYDROCHLORIDE 2.5 MG/ML
INJECTION, SOLUTION EPIDURAL; INFILTRATION; INTRACAUDAL; PERINEURAL AS NEEDED
Status: DISCONTINUED | OUTPATIENT
Start: 2025-05-12 | End: 2025-05-12 | Stop reason: HOSPADM

## 2025-05-12 RX ORDER — SODIUM CHLORIDE 9 MG/ML
40 INJECTION, SOLUTION INTRAVENOUS AS NEEDED
Status: DISCONTINUED | OUTPATIENT
Start: 2025-05-12 | End: 2025-05-12 | Stop reason: HOSPADM

## 2025-05-12 RX ORDER — DEXAMETHASONE SODIUM PHOSPHATE 4 MG/ML
INJECTION, SOLUTION INTRA-ARTICULAR; INTRALESIONAL; INTRAMUSCULAR; INTRAVENOUS; SOFT TISSUE AS NEEDED
Status: DISCONTINUED | OUTPATIENT
Start: 2025-05-12 | End: 2025-05-12 | Stop reason: SURG

## 2025-05-12 RX ORDER — ONDANSETRON 2 MG/ML
INJECTION INTRAMUSCULAR; INTRAVENOUS AS NEEDED
Status: DISCONTINUED | OUTPATIENT
Start: 2025-05-12 | End: 2025-05-12 | Stop reason: SURG

## 2025-05-12 RX ORDER — HYDROCODONE BITARTRATE AND ACETAMINOPHEN 7.5; 325 MG/1; MG/1
1 TABLET ORAL EVERY 4 HOURS PRN
Qty: 8 TABLET | Refills: 0 | Status: SHIPPED | OUTPATIENT
Start: 2025-05-12 | End: 2025-06-02

## 2025-05-12 RX ORDER — ONDANSETRON 2 MG/ML
4 INJECTION INTRAMUSCULAR; INTRAVENOUS ONCE AS NEEDED
Status: DISCONTINUED | OUTPATIENT
Start: 2025-05-12 | End: 2025-05-12 | Stop reason: HOSPADM

## 2025-05-12 RX ORDER — LIDOCAINE HCL/PF 100 MG/5ML
SYRINGE (ML) INJECTION AS NEEDED
Status: DISCONTINUED | OUTPATIENT
Start: 2025-05-12 | End: 2025-05-12 | Stop reason: SURG

## 2025-05-12 RX ORDER — SODIUM CHLORIDE, SODIUM LACTATE, POTASSIUM CHLORIDE, CALCIUM CHLORIDE 600; 310; 30; 20 MG/100ML; MG/100ML; MG/100ML; MG/100ML
50 INJECTION, SOLUTION INTRAVENOUS CONTINUOUS
Status: DISCONTINUED | OUTPATIENT
Start: 2025-05-12 | End: 2025-05-12 | Stop reason: HOSPADM

## 2025-05-12 RX ORDER — PROPOFOL 10 MG/ML
INJECTION, EMULSION INTRAVENOUS AS NEEDED
Status: DISCONTINUED | OUTPATIENT
Start: 2025-05-12 | End: 2025-05-12 | Stop reason: SURG

## 2025-05-12 RX ORDER — SODIUM CHLORIDE 0.9 % (FLUSH) 0.9 %
10 SYRINGE (ML) INJECTION AS NEEDED
Status: DISCONTINUED | OUTPATIENT
Start: 2025-05-12 | End: 2025-05-12 | Stop reason: HOSPADM

## 2025-05-12 RX ORDER — MIDAZOLAM HYDROCHLORIDE 2 MG/2ML
INJECTION, SOLUTION INTRAMUSCULAR; INTRAVENOUS AS NEEDED
Status: DISCONTINUED | OUTPATIENT
Start: 2025-05-12 | End: 2025-05-12 | Stop reason: SURG

## 2025-05-12 RX ADMIN — SODIUM CHLORIDE 2000 MG: 9 INJECTION, SOLUTION INTRAVENOUS at 13:58

## 2025-05-12 RX ADMIN — HEPARIN SODIUM 5000 UNITS: 5000 INJECTION INTRAVENOUS; SUBCUTANEOUS at 14:51

## 2025-05-12 RX ADMIN — Medication 40 MG: at 15:01

## 2025-05-12 RX ADMIN — PROPOFOL 100 MG: 10 INJECTION, EMULSION INTRAVENOUS at 15:01

## 2025-05-12 RX ADMIN — SCOPOLAMINE 1 PATCH: 1.5 PATCH, EXTENDED RELEASE TRANSDERMAL at 14:05

## 2025-05-12 RX ADMIN — MIDAZOLAM HYDROCHLORIDE 2 MG: 1 INJECTION, SOLUTION INTRAMUSCULAR; INTRAVENOUS at 14:57

## 2025-05-12 RX ADMIN — ONDANSETRON 4 MG: 2 INJECTION INTRAMUSCULAR; INTRAVENOUS at 14:57

## 2025-05-12 RX ADMIN — PROPOFOL 140 MCG/KG/MIN: 10 INJECTION, EMULSION INTRAVENOUS at 15:01

## 2025-05-12 RX ADMIN — DEXAMETHASONE SODIUM PHOSPHATE 4 MG: 4 INJECTION INTRA-ARTICULAR; INTRALESIONAL; INTRAMUSCULAR; INTRAVENOUS; SOFT TISSUE at 15:01

## 2025-05-12 RX ADMIN — FENTANYL CITRATE 50 MCG: 0.05 INJECTION, SOLUTION INTRAMUSCULAR; INTRAVENOUS at 14:57

## 2025-05-12 RX ADMIN — SODIUM CHLORIDE, POTASSIUM CHLORIDE, SODIUM LACTATE AND CALCIUM CHLORIDE 50 ML/HR: 600; 310; 30; 20 INJECTION, SOLUTION INTRAVENOUS at 13:41

## 2025-05-12 NOTE — ANESTHESIA POSTPROCEDURE EVALUATION
Patient: Ruthann Morales    Procedure Summary       Date: 05/12/25 Room / Location: [facility] OR  /  [facility]    Anesthesia Start: 1457 Anesthesia Stop: 1603    Procedure: BREAST LUMPECTOMY FOR RE-EXCISION OF POSITIVE SUPERIOR MARGIN (Left: Breast) Diagnosis:       Invasive ductal carcinoma of breast, female, left      (Invasive ductal carcinoma of breast, female, left [C50.912])    Surgeons: Tiesha Gunter MD Provider: Prakash Medel CRNA    Anesthesia Type: MAC ASA Status: 3            Anesthesia Type: MAC    Vitals  HR 56  Sat 95  BP 87/55  Resp 12  Temp 98        Post Anesthesia Care and Evaluation    Patient location during evaluation: bedside  Patient participation: complete - patient participated  Level of consciousness: awake and alert  Pain score: 0  Pain management: adequate    Airway patency: patent  Anesthetic complications: No anesthetic complications  PONV Status: none  Cardiovascular status: acceptable  Respiratory status: acceptable  Hydration status: acceptable

## 2025-05-12 NOTE — ANESTHESIA PREPROCEDURE EVALUATION
Anesthesia Evaluation     Patient summary reviewed and Nursing notes reviewed   history of anesthetic complications:  PONV  NPO Solid Status: > 8 hours  NPO Liquid Status: > 2 hours           Airway   Mallampati: II  TM distance: >3 FB  Neck ROM: full  No difficulty expected  Dental - normal exam     Pulmonary - normal exam   (-) not a smoker  Cardiovascular - normal exam  Exercise tolerance: good (4-7 METS)    ECG reviewed  Rhythm: regular  Rate: normal    (+) hypertension well controlled less than 2 medications, hyperlipidemia    ROS comment: 12/10/2015  EKG NSR Cannot r/o anterior infarct    Neuro/Psych  (+) psychiatric history Anxiety  GI/Hepatic/Renal/Endo    (+) obesity (bmi 36.4), thyroid problem hypothyroidism    ROS Comment: Abdominal hernia    Musculoskeletal     (+) arthralgias (right knee)  Abdominal   (+) obese   Substance History - negative use     OB/GYN          Other   arthritis,   history of cancer (Invasive ductal carcinoma of breast) active    ROS/Med Hx Other: Chronic fatigue    Leg cramps    K 3.9                Anesthesia Plan    ASA 3     MAC     (Risks and benefits discussed including risk of aspiration, recall and dental damage. All patient questions answered.    Will continue with plan of care.    Scope patch)    Anesthetic plan, risks, benefits, and alternatives have been provided, discussed and informed consent has been obtained with: patient.  Pre-procedure education provided      CODE STATUS:

## 2025-05-14 RX ORDER — LEVOTHYROXINE SODIUM 100 MCG
100 TABLET ORAL DAILY
Qty: 90 TABLET | Refills: 1 | Status: SHIPPED | OUTPATIENT
Start: 2025-05-14

## 2025-05-14 NOTE — TELEPHONE ENCOUNTER
Rx Refill Note  Requested Prescriptions     Pending Prescriptions Disp Refills    Synthroid 100 MCG tablet [Pharmacy Med Name: SYNTHROID    TAB 100MCG] 90 tablet 1     Sig: TAKE 1 TABLET DAILY      Last office visit with prescribing clinician: 2/13/2025     Next office visit with prescribing clinician: 9/8/2025     Bette Joe MA  05/14/25, 08:36 EDT

## 2025-05-15 LAB — REF LAB TEST METHOD: NORMAL

## 2025-05-15 NOTE — PROGRESS NOTES
Subjective   Ruthann Morales is a 64 y.o. female.   Chief Complaint   Patient presents with    Post-op Follow-up     Re excision         History of Present Illness The patient is in the office today for a post up visit following a left breast lumpectomy re-excision for positive margins on 05/12/2025. The pathology report was received and showed:   BREAST MARGIN, LEFT:  Fat necrosis, fibrosis, no residual carcinoma identified        BREAST MARGIN, ADDITIONAL SUPERIOR MEDIAL MARGIN:  Fat necrosis, fibrosis, no residual carcinoma identified.      The following portions of the patient's history were reviewed and updated as appropriate: allergies, current medications, past family history, past medical history, past social history, past surgical history, and problem list.    Review of Systems   Constitutional:  Negative for chills, fever and unexpected weight loss.   HENT:  Negative for hearing loss, trouble swallowing and voice change.    Eyes:  Negative for visual disturbance.   Respiratory:  Negative for apnea, cough, chest tightness, shortness of breath and wheezing.    Cardiovascular:  Negative for chest pain, palpitations and leg swelling.   Gastrointestinal:  Negative for abdominal distention, abdominal pain, anal bleeding, blood in stool, constipation, diarrhea, nausea, rectal pain, vomiting, GERD and indigestion.   Endocrine: Negative for cold intolerance and heat intolerance.   Genitourinary:  Negative for difficulty urinating, dysuria and flank pain.   Musculoskeletal:  Negative for back pain and gait problem.   Skin:  Negative for color change, rash, skin lesions and wound.   Neurological:  Negative for dizziness, syncope, speech difficulty, weakness, light-headedness and numbness.   Hematological:  Negative for adenopathy. Does not bruise/bleed easily.   Psychiatric/Behavioral:  Negative for depressed mood. The patient is not nervous/anxious.        Objective   /74   Pulse 75   Temp 98.4 °F (36.9  "°C) (Infrared)   Ht 165.1 cm (65\")   Wt 102 kg (224 lb)   SpO2 97%   BMI 37.28 kg/m²     Physical Exam      Assessment & Plan   Diagnoses and all orders for this visit:    1. Invasive ductal carcinoma of breast, female, left (Primary)                 "

## 2025-05-16 NOTE — OP NOTE
OPERATIVE REPORT    Patient Name:  Ruthann Morales  YOB: 1960    Date of Surgery:  4/29/2025       Pre-op Diagnosis:   Invasive ductal carcinoma of breast, female, left [C50.912]    Post-Op Diagnosis:      * Invasive ductal carcinoma of breast, female, left [C50.912]     Procedure(s):  LEFT BREAST LUMPECTOMY WITH SENTINEL NODE BIOPSY AND NEEDLE LOCALIZATION         Staff:  Surgeon(s):  Tiesha Gunter MD         Anesthesia: Monitored Anesthesia Care    Estimated Blood Loss:  10mL    Implants:    Implant Name Type Inv. Item Serial No.  Lot No. LRB No. Used Action   CLIPAPPLR M/ ENDO LIGACLIP 30CLP 11IN MD - [id number] Implant CLIPAPPLR M/ ENDO LIGACLIP 30CLP 11IN MD  [employer]  DIV [employer] J [employer] J [id number] Left 1 Implanted       Specimen:          Specimens       ID Source Type Tests Collected By Collected At Frozen?    A Breast, Left Tissue TISSUE EXAM, [facility] ([facility])   Katarina Villasenor RN 4/29/25 1229     Description: left breast mass short stitches superior long stitches lateral skin is anterior    B Breast, Left Tissue TISSUE EXAM, [facility] ([facility])   Katarina Villasenor RN 4/29/25 1155     Description: sentinel node number 1    C Breast, Left Tissue TISSUE EXAM, [facility] ([facility])   Katarina Villasenor RN 4/29/25 1156     Description: sentinel node number 2    D Breast, Left Tissue TISSUE EXAM, [facility] ([facility])   Latisha Peres RN 4/29/25 1206     Description: additional medial margin short stitch superior long stitch medial                Complications: none    Indications: Ms. Morales is a 64-year-old female patient who recently underwent routine breast imaging for cancer screening purposes and was found to have an abnormality in the left breast prompting biopsy.  Pathology from the biopsy specimen revealed a microinvasive ductal carcinoma of the left breast within a background of DCIS, and prompt surgical intervention was  recommended.  She was counseled regarding the options of both breast conservation, and mastectomy.  She understood that sentinel node biopsy would be required for staging purposes given the microinvasive component.  She was counseled regarding the risk, benefits, and alternatives to the procedure, and has provided her informed consent to proceed.    Description of Procedure:   The patient was seen and examined preoperatively in the holding area on the day of her surgical procedure. Her history and physical examination were updated. The left breast was marked. The patient was then taken to the radiology suite where she underwent wire localization of the non-palpable breast cancer. This was followed by a peritumoral injection of radiolabeled technetium for lymphatic mapping for sentinel lymph node biopsy. The patient was then brought back to the same-day surgery area where she received appropriate preoperative antibiotics and subcutaneous heparin for DVT prophylaxis. She was then taken the operating room she was placed supine on the operating table and a timeout was performed using the WHO checklist. Following the satisfactory induction of general anesthesia, the patient's left chest and axilla were prepped and draped in the usual sterile fashion. I began first with the sentinel lymph node biopsy. The neoprobe was used to ensure appropriate counts over the skin of both the injection site in the axilla. Preoperative skin counts over the tumor and axilla were appropriate to proceed.  Next, a curvilinear incision was made at the base of the hair-bearing area of the right axilla using a #15 blade knife and deepened through the subcutaneous tissue. Dissection continued until the clavipectoral fascia was reached and this was incised using Bovie electrocautery. The axillary fat pad was visualized in the neoprobe was used to assist with localization of the sentinel lymph node.  Blunt dissection using a hemostat was used to  dissect the sentinel node free from the surrounding tissue. Once the node was identified and confirmed with the neoprobe, its pedicle was clipped with a medium clip applier and Metzenbaums scissors were used to transect the pedicle. Both in vivo and ex vivo counts were recorded on the sentinel node count sheet.. The node was passed off the field and labeled sentinel node #1. The neoprobe was then inserted in the axilla to assess background counts.  A second sentinel node was identified and excised in the exact same fashion.  This was passed off the field and labeled sentinel node #2.  Background counts were reassessed and found to be less than 10% of the highest sentinel node count, indicating that no additional nodes required excision. Hemostasis was ensured in the wound and the tissue was then reapproximated in layers using a 3-0 Vicryl suture. The clavipectoral fascia was closed in an interrupted fashion using a 3-0 Vicryl suture. The skin was closed using a running 4-0 Vicryl placed in a subcuticular fashion.      Next, I turned my attention to the left breast. The cancerous lesion had been preoperatively localized using the wire. Its position was confirmed on preoperative mammography films. A curvilinear incision was made in the breast to encompass the wire and circumferential skin flaps were raised using Bovie electrocautery. The electrocautery was used to circumferentially dissect out the mass following the direction of the wire, taking care to ensure that an appropriate rim of normal tissue was obtained. The cautery was used to carry this excision all the way down past the tip of the wire, to the chest wall. Once this was done, the mass was delivered out of the wound and completely transected from the base. The specimen was then marked with a short stitch in the superior position and a long stitch in the lateral position. Visual inspection of the specimen demonstrated that the wire was completely covered by  breast tissue. There was no evidence of obvious tumor at the margins. The specimen was then sent to mammography for evaluation and repeat mammography of the specimen demonstrated a wire contained within the specimen.  The biopsy clip was not identified on mammography of the specimen, however, during the dissection portion of the lumpectomy, I had personally visualized the biopsy clip adjacent to a bleeding vessel, and it was my feeling that the clip had been lost in the suction device.  Hemostasis was obtained in the wound with cautery. Next the tissue defect in the breast was closed using interrupted 3-0 Vicryl sutures in order to accomplish a adjacent tissue rearrangement to fill the defect. Once this was adequately done, the skin incision was closed using a running subcuticular 4-0 Vicryl stitch. 0.25% Marcaine was then injected into both the breast wound and the axillary wound for postoperative analgesia. Mastisol and Steri-Strips were applied to both wounds and dry sterile dressings were placed. The patient was then awakened from anesthesia and taken the recovery room in good condition. There were no immediate complications noted. Dr. Gunter was present and scrubbed for the procedure in its entirety. All counts were correct at the conclusion of the procedure.               [place] Node Biopsy for Breast Cancer - Left  Operation performed with curative intent. Yes   Tracer(s) used to identify sentinel nodes in the upfront surgery (non-neoadjuvant) setting (select all that apply). Radioactive tracer   Tracer(s) used to identify sentinel nodes in the neoadjuvant setting (select all that apply). N/A   All nodes (colored or non-colored) present at the end of a dye-filled lymphatic channel were removed. N/A   All significantly radioactive nodes were removed. Yes   All palpably suspicious nodes were removed. N/A   Biopsy-proven positive nodes marked with clips prior to chemotherapy were identified and removed. N/A            Tiesha Gunter MD

## 2025-05-22 ENCOUNTER — OFFICE VISIT (OUTPATIENT)
Dept: SURGERY | Facility: CLINIC | Age: 65
End: 2025-05-22
Payer: MEDICARE

## 2025-05-22 VITALS
DIASTOLIC BLOOD PRESSURE: 74 MMHG | SYSTOLIC BLOOD PRESSURE: 132 MMHG | OXYGEN SATURATION: 97 % | TEMPERATURE: 98.4 F | HEART RATE: 75 BPM | HEIGHT: 65 IN | BODY MASS INDEX: 37.32 KG/M2 | WEIGHT: 224 LBS

## 2025-05-22 DIAGNOSIS — C50.912 INVASIVE DUCTAL CARCINOMA OF BREAST, FEMALE, LEFT: Primary | ICD-10-CM

## 2025-05-28 NOTE — OP NOTE
OPERATIVE REPORT    Patient Name:  Ruthann Morales  YOB: 1960    Date of Surgery:  5/12/2025       Pre-op Diagnosis:   Invasive ductal carcinoma of breast, female, left [C50.912]    Post-Op Diagnosis:     * Invasive ductal carcinoma of breast, female, left [C50.912]     Procedure(s):  BREAST LUMPECTOMY FOR RE-EXCISION OF POSITIVE SUPERIOR MARGIN         Staff:  Surgeon(s):  Tiesha Gunter MD         Anesthesia: Monitored Anesthesia Care    Estimated Blood Loss: minimal    Implants:    Nothing was implanted during the procedure    Specimen:          Specimens       ID Source Type Tests Collected By Collected At Frozen?    A Breast, Left Tissue TISSUE EXAM, [facility] ([facility])   Katarina Villasenor RN 5/12/25 6916     Description: superior margin short stitch superior, long stitch lateral    B Breast, Left Tissue TISSUE EXAM, [facility] ([facility])   Katarina Villasenor RN 5/12/25 2927     Description: additional superior medial margin short stitch superior, long stitch lateral                Complications: none    Indications: Ms. Morales is a 64-year-old female patient recently diagnosed with invasive ductal carcinoma of the left breast, recently status post Left lumpectomy with sentinel node biopsy.  She unfortunately had a positive superior margin on final pathology, and was counseled regarding the need for reexcision of said positive margin.  She is now being brought to the operating room for the procedure as discussed.  She has been counseled regarding the risk, benefits, and alternatives, and has provided her informed consent to proceed.    Description of Procedure: The patient was seen and examined preoperatively in the holding area on the day of her surgical procedure. Her history and physical examination were updated. The patient received appropriate preoperative antibiotics and subcutaneous heparin for DVT prophylaxis. She was then taken the operating room she was placed  supine on the operating table and a timeout was performed using the WHO checklist. Following the satisfactory induction of general anesthesia, the patient's left chest and axilla were prepped and draped in the usual sterile fashion. I turned my attention to the left breast.  The patient's previous lumpectomy incision was preemptively anesthetized with infiltration of lidocaine, and was then reopened sharply with a 15 blade knife.  Sutures were removed as they were encountered in the incision and deeper in the breast tissue.  Once the sutures were removed, the lumpectomy cavity was reopened, and a small seroma was drained.  Once the lumpectomy cavity was completely visible, attention was turned to the superior margin which was reexcised in its entirety using electrocautery.  There was no gross evidence of disease during this process.   The specimen was then marked with a short stitch in the superior position and a long stitch in the lateral position.This specimen appeared to be somewhat thin, and I elected to take a second additional superior margin, which was excised in the exact same way.  This was labeled accordingly and passed off the field as a separate specimen.  The lumpectomy cavity, and there was no evidence of gross abnormality.  Visual inspection of the  Hemostasis was obtained in the wound with cautery. Next the tissue defect in the breast was closed using interrupted 3-0 Vicryl sutures in order to accomplish a adjacent tissue rearrangement to fill the defect. Once this was adequately done, the skin incision was closed using a running subcuticular 4-0 Vicryl stitch. 0.25% Marcaine was then injected into both the breast wound and the axillary wound for postoperative analgesia. Mastisol and Steri-Strips were applied to both wounds and dry sterile dressings were placed. The patient was then awakened from anesthesia and taken the recovery room in good condition. There were no immediate complications noted.  Dr. Gunter was present and scrubbed for the procedure in its entirety. All counts were correct at the conclusion of the procedure.       This procedure was not performed to treat breast cancer through sentinel node biopsy     Tiesha Gunter MD

## 2025-06-02 ENCOUNTER — OFFICE VISIT (OUTPATIENT)
Age: 65
End: 2025-06-02
Payer: MEDICARE

## 2025-06-02 ENCOUNTER — CONSULT (OUTPATIENT)
Age: 65
End: 2025-06-02
Payer: MEDICARE

## 2025-06-02 ENCOUNTER — HOSPITAL ENCOUNTER (OUTPATIENT)
Facility: HOSPITAL | Age: 65
Setting detail: RADIATION/ONCOLOGY SERIES
End: 2025-06-02
Payer: MEDICARE

## 2025-06-02 VITALS
HEART RATE: 76 BPM | SYSTOLIC BLOOD PRESSURE: 171 MMHG | HEIGHT: 65 IN | DIASTOLIC BLOOD PRESSURE: 92 MMHG | BODY MASS INDEX: 37.82 KG/M2 | RESPIRATION RATE: 16 BRPM | OXYGEN SATURATION: 97 % | TEMPERATURE: 99.1 F | WEIGHT: 227 LBS

## 2025-06-02 VITALS
SYSTOLIC BLOOD PRESSURE: 174 MMHG | HEIGHT: 65 IN | DIASTOLIC BLOOD PRESSURE: 94 MMHG | TEMPERATURE: 97.3 F | RESPIRATION RATE: 12 BRPM | HEART RATE: 78 BPM | OXYGEN SATURATION: 97 % | WEIGHT: 227 LBS | BODY MASS INDEX: 37.82 KG/M2

## 2025-06-02 DIAGNOSIS — C50.912 INVASIVE DUCTAL CARCINOMA OF BREAST, FEMALE, LEFT: Primary | ICD-10-CM

## 2025-06-02 PROCEDURE — 1125F AMNT PAIN NOTED PAIN PRSNT: CPT | Performed by: INTERNAL MEDICINE

## 2025-06-02 PROCEDURE — 99204 OFFICE O/P NEW MOD 45 MIN: CPT | Performed by: INTERNAL MEDICINE

## 2025-06-02 PROCEDURE — G0463 HOSPITAL OUTPT CLINIC VISIT: HCPCS

## 2025-06-02 PROCEDURE — 3080F DIAST BP >= 90 MM HG: CPT | Performed by: INTERNAL MEDICINE

## 2025-06-02 PROCEDURE — 3077F SYST BP >= 140 MM HG: CPT | Performed by: INTERNAL MEDICINE

## 2025-06-02 NOTE — PROGRESS NOTES
CONSULTATION NOTE      :                                                          1960  DATE OF CONSULTATION:                       2025   REQUESTING PHYSICIAN:                   Faye Riley MD  REASON FOR CONSULTATION:           Invasive ductal carcinoma of breast, female, left for further evaluation and management with potential radiotherapy at the request of Dr. Riley.  - Stage IA (cT1c, cN0, cM0, G2, ER+, NC+, HER2-)         BRIEF HISTORY:  The patient is a very pleasant 64 y.o. female who has been very compliant with her yearly mammograms.  The patient had a mammogram 3/20/2025 that showed a left upper outer quadrant abnormality.  The patient then had an ultrasound-guided biopsy that was consistent with DCIS with minimal invasive disease of ductal histology.  The patient's disease on the biopsy was ER/NC positive HER2 negative.  The patient then met with Dr. Gunter who initially performed a resection on 2025.  This showed a pT1 cN0 M0 ductal carcinoma grade 1 measuring 1.2 cm in size.  The patient had negative LVI.  Patient did have concern for low bit of a positive margin.  She was taken back for reresection on 2025 where additional margins were taken.  At that time no residual disease was identified.  The patient discussed options with Dr. Riley who is ordering a MammaPrint and will see the patient back in a couple weeks to discuss that result to see if she needs chemotherapy at all.    Patient reports today that she is doing well and has recovered well.  She has minimal complaints regarding her surgery.  Patient reports that she has no arm swelling and has good arm mobility.    Allergy:   Allergies   Allergen Reactions    Codeine Rash    Sulfa Antibiotics Rash       Social History:   Social History     Socioeconomic History    Marital status:    Tobacco Use    Smoking status: Never    Smokeless tobacco: Never   Vaping Use    Vaping status: Never Used   Substance and Sexual  "Activity    Alcohol use: No    Drug use: No    Sexual activity: Defer       Past Medical History:   Past Medical History:   Diagnosis Date    Abdominal hernia     Anxiety     Arthritis     \"maybe\"    Breast cancer     Breast lump     left    Disease of thyroid gland     Exposure to TB     30 years ago    Hypertension     Hypothyroidism     Invasive ductal carcinoma of breast, female, left     Irreducible incisional hernia 2016    PONV (postoperative nausea and vomiting)     Thoracic outlet syndrome     Vitamin D deficiency 2023       Family History: family history includes Breast cancer (age of onset: 54) in her maternal aunt; Breast cancer (age of onset: 57) in her mother; Cancer in her father and mother; Diabetes in her maternal grandfather, paternal grandmother, and another family member; Heart attack in an other family member; Lymphoma in her mother; Multiple myeloma in her father; Stroke in an other family member.     Surgical History:   Past Surgical History:   Procedure Laterality Date    APPENDECTOMY      BREAST BIOPSY Left     BREAST LUMPECTOMY Left 2025    Procedure: BREAST LUMPECTOMY FOR RE-EXCISION OF POSITIVE SUPERIOR MARGIN;  Surgeon: Tiesha Gunter MD;  Location: [facility];  Service: General;  Laterality: Left;    BREAST LUMPECTOMY WITH SENTINEL NODE BIOPSY Left 2025    Procedure: LEFT BREAST LUMPECTOMY WITH SENTINEL NODE BIOPSY AND NEEDLE LOCALIZATION;  Surgeon: Tiesha Gunter MD;  Location: [facility];  Service: General;  Laterality: Left;     SECTION      two    HERNIA REPAIR      HERNIA REPAIR  2015    JOINT REPLACEMENT  10-24-23    Can’t remember exact michelle    KNEE ARTHROSCOPY W/ MENISCAL REPAIR Right     LYMPH NODE BIOPSY  25    TOTAL KNEE ARTHROPLASTY Right     UMBILICAL HERNIA REPAIR          Review of Systems:   Review of Systems   Constitutional:  Negative for appetite change, chills, fatigue, fever and unexpected weight change.   HENT:   " "Negative for sore throat and trouble swallowing.    Respiratory:  Negative for chest tightness, cough and shortness of breath.    Cardiovascular:  Negative for chest pain and leg swelling.   Gastrointestinal:  Positive for diarrhea (Intermittent since starting Prozac). Negative for abdominal pain, nausea and vomiting.   Endocrine: Negative.    Genitourinary: Negative.     Musculoskeletal:  Negative for arthralgias and myalgias.        History of thoracic outlet syndrome     Skin:  Positive for wound (healing surgical incisions x 2 (1 breast/1 axilla)).   Neurological:  Negative for dizziness, headaches and light-headedness.   Hematological:  Negative for adenopathy.   Psychiatric/Behavioral:  Positive for depression and sleep disturbance (wakes up early; interrupted sleep). The patient is nervous/anxious.            Objective   VITAL SIGNS:   Vitals:    06/02/25 0933   BP: 171/92   Pulse: 76   Resp: 16   Temp: 99.1 °F (37.3 °C)   TempSrc: Oral   SpO2: 97%   Weight: 103 kg (227 lb)   Height: 165.1 cm (65\")   PainSc: 0-No pain                Physical Exam:   Physical Exam  Vitals and nursing note reviewed.   Constitutional:       Appearance: She is well-developed.   HENT:      Head: Normocephalic and atraumatic.   Eyes:      Conjunctiva/sclera: Conjunctivae normal.      Pupils: Pupils are equal, round, and reactive to light.   Neck:      Comments: No obviously enlarged cervical or supraclavicular LAD.  Cardiovascular:      Comments: Patient well perfused. Non cyanotic. No prominent JVD. No pedal edema  Pulmonary:      Effort: Pulmonary effort is normal.      Breath sounds: Normal breath sounds. No stridor. No wheezing.   Abdominal:      General: There is no distension.      Palpations: Abdomen is soft.   Musculoskeletal:         General: Normal range of motion.      Cervical back: Normal range of motion and neck supple.      Comments: Patient moves all extremities spontaneously.     No arm or hand swelling present. "   Skin:     General: Skin is warm and dry.      Comments: Breast exam deferred today as the patient is deciding whether or not she will have chemotherapy.  Will follow-up with the patient at her next follow-up with exam.   Neurological:      Mental Status: She is alert and oriented to person, place, and time.      Comments: Coordination intact.   Psychiatric:         Behavior: Behavior normal.         Thought Content: Thought content normal.         Judgment: Judgment normal.              The following portions of the patient's history were reviewed and updated as appropriate: allergies, current medications, past family history, past medical history, past social history, past surgical history, and problem list.  I have personally requested reviewed and interpreted the patient's images and radiology reports and pathology reports listed below:  I reviewed the patient's mammography images.  Signed I reviewed the patient's ultrasound.  I reviewed the patient's initial biopsy report.  I reviewed the patient's first lumpectomy.  I reviewed the patient's second lumpectomy.  I reviewed the patient's history with Dr. Riley personally.    Assessment:   Assessment      Patient is a very pleasant 64-year-old female with a left upper outer quadrant ER/MO positive HER2 negative invasive ductal carcinoma [id number] malignancy grade 1 with negative LVSI.  The patient did have some concerns for a positive margin at the time of her lumpectomy and went reresection.  At the time of her reresection she had no evidence of residual disease but did have some fat necrosis.    The patient discussed options with Dr. Riley who recommended adjuvant radiotherapy as well as a MammaPrint to determine if she needs adjuvant chemotherapy.      We discussed what to expect with a course of radiotherapy.  I would recommend APBI for her.  This would include a planning session in 5 treatments.  I would recommend treatment delivery over a 2-week timeframe  of every other day.  The patient would require a dose of 30 Gray in 5 fractions to the lumpectomy plus a margin around it.    Would recommend IGR T and daily IMRT with DIBH given the left breast location.    We discussed what she could anticipate from acute and chronic side effect profile from a course of radiotherapy.  We discussed the risk of breast size discrepancy but can develop over the years as well as risk of telangiectasias.  She has been very low risk for the development of edema with this lesion and APBI however it is theoretically possible.    We we will see the patient back after she discusses things with Dr. Riley and they have her MammaPrint.  If the patient does require chemotherapy we will delay her radiation till the completion of chemo.    We touched on the role of adjuvant hormonal therapy if is felt to be necessary as would be prescribed by Dr. Riley.    The patient was interested in a course of radiotherapy and we will have her return after she discusses things with Dr. Riley.  We will have her scheduled for CT simulation and if we need it we will use it.    Greater than 1 hour was spent preparing for and coordinating this visit. >50% of the time was spent in direct face to face conversation with the patient teaching, answering question, and providing explanations regarding the patient's case.  The decision to treat the patient with radiation is a complex one and carries the risk of long-term side effects and complications.  The patient's malignancy represents a complicated life threatening condition that requires complex multidisciplinary management for treatment and followup.      RECOMMENDATIONS:      Left upper outer quadrant breast cancer  - ER/PA positive HER2 negative  - Grade 1  - cT1c [place] nodes negative  -Initially had some concern for positive margins  -Reresection on 5/12/2025 showed no evidence of residual carcinoma  - Resected with Dr. Tiesha Gunter in [place]  - Follows with  Dr. Riley with medical oncology  - Has MammaPrint pending  - Will follow-up after her MammaPrint results come back with Dr. Riley to determine if she needs chemotherapy  - If she does not need chemotherapy will we will proceed with APBI with DIBH to the left breast 30 Gray 5 fractions to the resection cavity plus margin  - Recommend treatments every other day  - If the patient does not chemotherapy we will delay her radiation till after completion of chemo.      Follow Up:   No follow-ups on file.  There are no diagnoses linked to this encounter.     Ezra Jane MD

## 2025-06-02 NOTE — PROGRESS NOTES
DATE OF CONSULTATION: 6/2/2025    REFERRING PHYSICIAN: Ana Daniels, MERT    Dear Ana Farrell, APRN  Thank you for asking for my medical advice on this patient. I saw her in the  [place] office on 6/2/2025    REASON FOR CONSULTATION: Left breast cancer    PROBLEM LIST:   1.  Left upper outer quadrant invasive ductal carcinoma, T1 cN0 M0 stage Ia:  A.  Presented with abnormal screening mammogram March 24, 2025  B.  Diagnosed after ultrasound-guided biopsy March 25, 2025.  C.  Treated with lumpectomy with sentinel lymph node biopsy done by Dr. Gunter April 29 2025 with second lumpectomy May 12, 2025 for positive margin.  D.  Final pathology revealed invasive ductal carcinoma, 1.2 cm, clear surgical margins with second lumpectomy, 2 negative sentinel lymph nodes, ER/DC 95% HER2/carlo -1+.    HISTORY OF PRESENT ILLNESS: The patient is a very pleasant 64 y.o.  female   who was in her usual state of health until March 2025 the patient presented with abnormal screening mammogram March 2025.  This was followed by ultrasound-guided biopsy that confirmed breast cancer.  The patient was for Dr. Gunter who performed lumpectomy April 2025.  There was questionable positive margin and Dr. Gunter did a second lumpectomy with no evidence of residual carcinoma.  The patient was referred to me for further recommendations.    SUBJECTIVE: When I saw the patient today she is here with her .  She is anxious at today's visit.  Never been diagnosed with cancer.  Her mom had a history of breast cancer and lymphoma her father had a history of myeloma.  She has 2 healthy sons.  She never smokes does not drink or use drugs.  This was incidentally noted on screening mammogram she did not feel or palpate a mass in her breast no nipple discharge.    Review of Systems   Constitutional:  Positive for fatigue.   Respiratory: Negative.     Musculoskeletal:  Positive for arthralgias.   Psychiatric/Behavioral:  The patient is  "nervous/anxious.        Past Medical History:   Diagnosis Date    Abdominal hernia     Anxiety     Arthritis     \"maybe\"    Breast cancer     Breast lump     left    Disease of thyroid gland     Exposure to TB     30 years ago    Hypertension     Hypothyroidism     Invasive ductal carcinoma of breast, female, left     Irreducible incisional hernia 2016    PONV (postoperative nausea and vomiting)     Vitamin D deficiency 2023       Social History     Socioeconomic History    Marital status:    Tobacco Use    Smoking status: Never    Smokeless tobacco: Never   Vaping Use    Vaping status: Never Used   Substance and Sexual Activity    Alcohol use: No    Drug use: No    Sexual activity: Defer       Family History   Problem Relation Age of Onset    Lymphoma Mother     Breast cancer Mother 57    Cancer Mother     Multiple myeloma Father     Cancer Father     Diabetes Maternal Grandfather         Diabetic    Diabetes Paternal Grandmother         Diabetic    Breast cancer Maternal Aunt 54    Stroke Other     Diabetes Other     Heart attack Other        Past Surgical History:   Procedure Laterality Date    APPENDECTOMY      BREAST BIOPSY Left     BREAST LUMPECTOMY Left 2025    Procedure: BREAST LUMPECTOMY FOR RE-EXCISION OF POSITIVE SUPERIOR MARGIN;  Surgeon: Tiesha Gunter MD;  Location: [facility];  Service: General;  Laterality: Left;    BREAST LUMPECTOMY WITH SENTINEL NODE BIOPSY Left 2025    Procedure: LEFT BREAST LUMPECTOMY WITH SENTINEL NODE BIOPSY AND NEEDLE LOCALIZATION;  Surgeon: Tiesha Gunter MD;  Location: [facility] OR;  Service: General;  Laterality: Left;     SECTION      two    HERNIA REPAIR      HERNIA REPAIR  2015    JOINT REPLACEMENT  10-24-23    Can’t remember exact michelle    KNEE ARTHROSCOPY W/ MENISCAL REPAIR Right     LYMPH NODE BIOPSY  25    TOTAL KNEE ARTHROPLASTY Right     UMBILICAL HERNIA REPAIR         Allergies   Allergen Reactions    Codeine Rash " "   Sulfa Antibiotics Rash          Current Outpatient Medications:     Cholecalciferol (VITAMIN D) 1000 UNITS tablet, Take 2 tablets by mouth Daily., Disp: , Rfl:     FLUoxetine (PROzac) 10 MG capsule, Take 1 capsule by mouth Daily., Disp: 90 capsule, Rfl: 1    LORazepam (Ativan) 0.5 MG tablet, Take 1 tablet by mouth Every 8 (Eight) Hours As Needed (Panic attack)., Disp: 12 tablet, Rfl: 0    propranolol (INDERAL) 10 MG tablet, Take 1 tablet by mouth twice daily, Disp: 60 tablet, Rfl: 0    Synthroid 100 MCG tablet, TAKE 1 TABLET DAILY, Disp: 90 tablet, Rfl: 1    PHYSICAL EXAMINATION:   /94   Pulse 78   Temp 97.3 °F (36.3 °C)   Resp 12   Ht 165.1 cm (65\")   Wt 103 kg (227 lb)   SpO2 97%   BMI 37.77 kg/m²   Pain Score    06/02/25 0815   PainSc: 2    PainLoc: Breast                   ECOG Performance Status: 1 - Symptomatic but completely ambulatory  General Appearance:  alert, cooperative, no apparent distress and appears stated age   Neurologic/Psychiatric: A&O x 3, gait steady, appropriate affect, strength 5/5 in all muscle groups   HEENT:  Normocephalic, without obvious abnormality, mucous membranes moist   Neck: Supple, symmetrical, trachea midline, no adenopathy;  No thyromegaly, masses, or tenderness   Lungs:   Clear to auscultation bilaterally; respirations regular, even, and unlabored bilaterally   Heart:  Regular rate and rhythm, no murmurs appreciated   Abdomen:   Soft, non-tender, non-distended, and no organomegaly   Lymph nodes: No cervical, supraclavicular, inguinal or axillary adenopathy noted   Extremities: Normal, atraumatic; no clubbing, cyanosis, or edema    Skin: No rashes, ulcers, or suspicious lesions noted       No visits with results within 2 Week(s) from this visit.   Latest known visit with results is:   Admission on 05/12/2025, Discharged on 05/12/2025   Component Date Value Ref Range Status    Reference Lab Report 05/12/2025    Final                    [facility] [facility]  [address] [place] [address]  Phone: [phone] or [phone]  Fax: [phone]  Milton Fair M.D., Medical Director    PATIENT NAME                           LABORATORY NO.  AUDRA HAN.                  [id number]  [id number]                         AGE              SEX  SSN           CLIENT REF #  [facility]            64      1960  F    [id number]   [id number]    [address] [place] [address] BY-PASS                REQUESTING M.D.     ATTENDING MLISS.     COPY TO.  [address]                        [place], [place] [address]                 DATE COLLECTED      DATE RECEIVED      DATE REPORTED  2025          2025         05/15/2025    DIAGNOSIS:  A.   BREAST MARGIN, LEFT:  Fat necrosis, fibrosis, no residual carcinoma identified  B.   BREAST MARGIN, ADDITIONAL SUPERIOR MEDIAL MARGIN:  Fat necrosis, fibrosis, no residual carcinoma identified    EJT/sdl    CLINICAL                           HISTORY:  Invasive ductal carcinoma of breast, female, left    SPECIMENS RECEIVED:  A.  BREAST MARGIN, LEFT  B.  BREAST MARGIN, ADDITIONAL SUPERIOR MEDIAL MARGIN    MICROSCOPIC DESCRIPTION:  A.  Sections confirm primarily adipose tissue with a variable thick rind of  breast tissue demonstrating entrapped and reactive appearing ducts and  lobules.  Immunostains of one collection of these (A11) reveal P63/SMH  positive myoepithelium surrounding the ducts and lobules, confirming the  diagnosis.  No residual carcinoma is identified.  B.  Sections confirm adipose tissue with a variable thick rind of benign,  reactive appearing breast tissue.  There is extensive fat necrosis with  fibrosis and hemorrhage, but no residual carcinoma is observed.    The internal and external (both positive and negative) controls reacted  appropriately. Some of our immunohistochemical and in situ hybridization  studies are performed as analyte specific reagents.  "The following statement  applies to those tests:                           This test was developed, and its performance  characteristics determined by [facility]. It has not been  cleared or approved by the [place] Food and Drug Administration. However, the  FDA has determined that approval and clearance are not necessary.    Professional interpretation rendered by Andre Espinosa Jr., M.D., DAWSON. at  [employer], [facility], [address] [place] [address].    GROSS DESCRIPTION:  A.  Received in formalin labeled \"superior margin short stitch superior, long  stitch lateral\" and consists of an oriented portion of fibroadipose tissue  with a short stitch designating superior and a long stitch designating lateral  weighing 12 g and measuring 7.5 cm medial to lateral, 4.5 cm superior to  inferior, and 0.9 cm superficial to deep.  The superficial aspect has a  glistening and nodular surface with multiple embedded surgical staples  present, consistent with the previous resection side (old margin). The  specimen is inked as follows: Superior-red,                           inferior-blue, lateral-orange,  medial-yellow, superficial-green and deep-black.  The specimen is serially  sectioned from lateral to medial revealing 85% tan-yellow, lobulated  adipose tissue and 15% tan-white, stringy fibrous tissue.  No lesions or  biopsy clips are grossly identified.  The specimen is entirely submitted  sequentially from lateral to medial in cassettes A1-A18.  Cold ischemic  time: 13 minutes; total time in formalin: Between 6 to 72 hours.    B.  Received in formalin labeled \"additional superior medial margin short  stitch superior long lateral\" consists of an oriented portion of fibroadipose  tissue with a short stitch designating superior and a long stitch designating  lateral weighing 2 g and measuring 4.7 cm medial to lateral, 2.0 cm  superior to inferior, and 1.0 cm superficial to deep.  The superficial aspect  has a " glistening and nodular surface, consistent with the previous resection  side (old margin).  The specimen is inked as follows:                           Superior-red,  inferior-blue, lateral-orange, medial-yellow, superficial-green and deep-  black.  The specimen is serially sectioned from lateral to medial revealing  85% tan-yellow, lobulated adipose tissue and 15% tan-white, stringy  fibrous tissue.  No discrete masses or biopsy clips are grossly identified.  The specimen is submitted entirely and sequentially from lateral to medial  in cassettes B1-B5.  Cold ischemia time: 8 minutes; total time in formalin:  Between 6 to 72 hours.  MJM    REVIEWED, DIAGNOSED AND ELECTRONICALLY  SIGNED BY:    Andre Espinosa Jr., M.D., F.C.A.P.  CPT CODES:  88307x2, 08780, 54857          No results found.      DIAGNOSTIC DATA:   Extensive patient medical records including doctor notes, blood work results, pathology report and imaging reports reviewed by me and documented in the patient's chart.      ASSESSMENT: The patient is a very pleasant 64 y.o.  female  with left breast cancer    PLAN:     1.  Left upper outer quadrant breast cancer, T1 cN0 M0 stage Ia:  A.  I had a long discussion today with the patient and her  about her  new diagnosis of breast cancer. I did go over the final pathology report in  detail with both of them. I reviewed the patient's documents including refereing provider's notes, lab results, imaging, and path report.   B.  I will send MammaPrint.  C.  The patient would benefit from adjuvant hormonal therapy given her positive hormone receptors. We reviewed the potential side effects of anastrozole including hot flashes, vaginal dryness, joint pain, decrease in bone density, and mood or sleep disturbance.  D.  The patient will consider adjuvant radiation.  I discussed the case with Dr. Jane from additional oncology today over the phone who kindly agreed to see her today and get her set up for  adjuvant radiation.  E.  She will follow-up with me in 2 weeks.  If she has low risk disease I will start her on anastrozole 1 milligram daily referred to radiation oncology.    2.  Hypothyroid:  A.  She will continue Synthroid daily.    3.  Anxiety:  A.  She will continue Ativan as needed.    4.  Mild depression:  A.  She will continue Prozac daily.    FOLLOW UP: 3 [facility] with results.    Faye Riley MD  6/2/2025

## 2025-06-03 ENCOUNTER — DOCUMENTATION (OUTPATIENT)
Dept: OTHER | Facility: HOSPITAL | Age: 65
End: 2025-06-03
Payer: MEDICARE

## 2025-06-03 NOTE — PROGRESS NOTES
Distress Screening Follow-up    Name: Ruthann Morales    : 1960    Diagnosis: Invasive ductal carcinoma of breast, female, left     Location of Distress Screening: Radiation Oncology    Distress Level: 7 (2025 12:29 PM)      Emotional Concerns:  Worry or anxiety: Y  Fear: Y       Interventions:    N/A    Comments:   ALISTAIR contacted pt to follow up on Distress Screen from recent visit. ALISTAIR provided introductions and explained nature of the call. Pt communicated she is doing okay at this time, and denied any needs. SW provided education on role and resources available, and provided contact information should needs arise. PT thanked ALISTAIR for the call and was agreeable to reach out as needed. ALISTAIR will be available ongoing.

## 2025-06-16 ENCOUNTER — OFFICE VISIT (OUTPATIENT)
Age: 65
End: 2025-06-16
Payer: MEDICARE

## 2025-06-16 VITALS
RESPIRATION RATE: 12 BRPM | TEMPERATURE: 96.8 F | OXYGEN SATURATION: 98 % | SYSTOLIC BLOOD PRESSURE: 189 MMHG | HEIGHT: 65 IN | WEIGHT: 225 LBS | HEART RATE: 86 BPM | DIASTOLIC BLOOD PRESSURE: 81 MMHG | BODY MASS INDEX: 37.49 KG/M2

## 2025-06-16 VITALS
WEIGHT: 225 LBS | SYSTOLIC BLOOD PRESSURE: 165 MMHG | DIASTOLIC BLOOD PRESSURE: 83 MMHG | OXYGEN SATURATION: 96 % | HEIGHT: 65 IN | TEMPERATURE: 98.8 F | RESPIRATION RATE: 16 BRPM | HEART RATE: 68 BPM | BODY MASS INDEX: 37.49 KG/M2

## 2025-06-16 DIAGNOSIS — C50.912 INVASIVE DUCTAL CARCINOMA OF BREAST, FEMALE, LEFT: Primary | ICD-10-CM

## 2025-06-16 LAB — REF LAB TEST METHOD: NORMAL

## 2025-06-16 PROCEDURE — 77332 RADIATION TREATMENT AID(S): CPT | Performed by: RADIOLOGY

## 2025-06-16 PROCEDURE — G0463 HOSPITAL OUTPT CLINIC VISIT: HCPCS

## 2025-06-16 RX ORDER — ANASTROZOLE 1 MG/1
1 TABLET ORAL DAILY
Qty: 30 TABLET | Refills: 11 | Status: SHIPPED | OUTPATIENT
Start: 2025-06-16

## 2025-06-16 NOTE — PROGRESS NOTES
Follow-up NOTE      :                                                          1960  DATE OF CONSULTATION:                       2025   REQUESTING PHYSICIAN:                   Faye Riley MD  REASON FOR CONSULTATION:           Invasive ductal carcinoma of breast, female, left for further evaluation and management with potential radiotherapy at the request of Dr. Riley.  - Stage IA (cT1c, cN0, cM0, G2, ER+, MA+, HER2-)         BRIEF HISTORY:  The patient is a very pleasant 64 y.o. female who has been very compliant with her yearly mammograms.  The patient had a mammogram 3/20/2025 that showed a left upper outer quadrant abnormality.  The patient then had an ultrasound-guided biopsy that was consistent with DCIS with minimal invasive disease of ductal histology.  The patient's disease on the biopsy was ER/MA positive HER2 negative.  The patient then met with Dr. Gunter who initially performed a resection on 2025.  This showed a pT1 cN0 M0 ductal carcinoma grade 1 measuring 1.2 cm in size.  The patient had negative LVI.  Patient did have concern for low bit of a positive margin.  She was taken back for reresection on 2025 where additional margins were taken.  At that time no residual disease was identified.  The patient had a MammaPrint returned discussed that with Dr. iRley.  The patient was low risk and would not proceed with chemotherapy.    The patient was interested in proceeding with radiotherapy reports today to discuss the results and for planning.      Patient reports today that she is doing well and has recovered well.  She has minimal complaints regarding her surgery.  Patient reports that she has no arm swelling and has good arm mobility.      Allergy:   Allergies   Allergen Reactions    Codeine Rash    Sulfa Antibiotics Rash       Social History:   Social History     Socioeconomic History    Marital status:    Tobacco Use    Smoking status: Never    Smokeless tobacco: Never  "  Vaping Use    Vaping status: Never Used   Substance and Sexual Activity    Alcohol use: No    Drug use: No    Sexual activity: Defer       Past Medical History:   Past Medical History:   Diagnosis Date    Abdominal hernia     Anxiety     Arthritis     \"maybe\"    Breast cancer     Breast lump     left    Disease of thyroid gland     Exposure to TB     30 years ago    Hypertension     Hypothyroidism     Invasive ductal carcinoma of breast, female, left     Irreducible incisional hernia 2016    PONV (postoperative nausea and vomiting)     Thoracic outlet syndrome     Vitamin D deficiency 2023       Family History: family history includes Breast cancer (age of onset: 54) in her maternal aunt; Breast cancer (age of onset: 57) in her mother; Cancer in her father and mother; Diabetes in her maternal grandfather, paternal grandmother, and another family member; Heart attack in an other family member; Lymphoma in her mother; Multiple myeloma in her father; Stroke in an other family member.     Surgical History:   Past Surgical History:   Procedure Laterality Date    APPENDECTOMY      BREAST BIOPSY Left     BREAST LUMPECTOMY Left 2025    Procedure: BREAST LUMPECTOMY FOR RE-EXCISION OF POSITIVE SUPERIOR MARGIN;  Surgeon: Tiesha Gunter MD;  Location: [facility];  Service: General;  Laterality: Left;    BREAST LUMPECTOMY WITH SENTINEL NODE BIOPSY Left 2025    Procedure: LEFT BREAST LUMPECTOMY WITH SENTINEL NODE BIOPSY AND NEEDLE LOCALIZATION;  Surgeon: Tiesha Gunter MD;  Location: [facility];  Service: General;  Laterality: Left;     SECTION      two    HERNIA REPAIR      HERNIA REPAIR      JOINT REPLACEMENT  10-24-23    Can’t remember exact michelle    KNEE ARTHROSCOPY W/ MENISCAL REPAIR Right     LYMPH NODE BIOPSY  25    TOTAL KNEE ARTHROPLASTY Right     UMBILICAL HERNIA REPAIR          Review of Systems:   Review of Systems   Constitutional:  Negative for appetite change, " "chills, fatigue, fever and unexpected weight change.   HENT:  Negative.  Negative for sore throat and trouble swallowing.    Eyes: Negative.    Respiratory:  Negative for chest tightness, cough and shortness of breath.    Cardiovascular:  Negative for chest pain and leg swelling.   Gastrointestinal:  Negative for abdominal pain, diarrhea, nausea and vomiting.   Endocrine: Negative.  Negative for hot flashes.   Genitourinary: Negative.     Musculoskeletal:  Negative for arthralgias, back pain and myalgias.        History of thoracic outlet syndrome     Skin:  Positive for wound (healing surgical incisions x 2 (1 breast/1 axilla)).   Neurological: Negative.  Negative for dizziness, headaches and light-headedness.   Hematological: Negative.  Negative for adenopathy.   Psychiatric/Behavioral:  Negative for depression and sleep disturbance. The patient is nervous/anxious.            Objective   VITAL SIGNS:   Vitals:    06/16/25 0947   BP: 165/83   Pulse: 68   Resp: 16   Temp: 98.8 °F (37.1 °C)   TempSrc: Oral   SpO2: 96%   Weight: 102 kg (225 lb)   Height: 165.1 cm (65\")   PainSc: 0-No pain        Karnofsky score: 90       Physical Exam:   Physical Exam  Vitals and nursing note reviewed.   Constitutional:       Appearance: She is well-developed.   HENT:      Head: Normocephalic and atraumatic.   Eyes:      Conjunctiva/sclera: Conjunctivae normal.      Pupils: Pupils are equal, round, and reactive to light.   Neck:      Comments: No obviously enlarged cervical or supraclavicular LAD.  Cardiovascular:      Comments: Patient well perfused. Non cyanotic. No prominent JVD. No pedal edema  Pulmonary:      Effort: Pulmonary effort is normal.      Breath sounds: Normal breath sounds. No stridor. No wheezing.   Abdominal:      General: There is no distension.      Palpations: Abdomen is soft.   Musculoskeletal:         General: Normal range of motion.      Cervical back: Normal range of motion and neck supple.      Comments: " Patient moves all extremities spontaneously.     No arm or hand swelling present.   Skin:     General: Skin is warm and dry.      Comments: Well-healed surgical incision on the left breast and in the axilla.   Neurological:      Mental Status: She is alert and oriented to person, place, and time.      Comments: Coordination intact.   Psychiatric:         Behavior: Behavior normal.         Thought Content: Thought content normal.         Judgment: Judgment normal.              The following portions of the patient's history were reviewed and updated as appropriate: allergies, current medications, past family history, past medical history, past social history, past surgical history, and problem list.  I have personally requested reviewed and interpreted the patient's images and radiology reports and pathology reports listed below:  I reviewed the patient's mammography images.  Signed I reviewed the patient's ultrasound.  I reviewed the patient's initial biopsy report.  I reviewed the patient's first lumpectomy.  I reviewed the patient's second lumpectomy.  I reviewed the patient's MammaPrint results.    Assessment:   Assessment      Patient is a very pleasant 64-year-old female with a left upper outer quadrant ER/AZ positive HER2 negative invasive ductal carcinoma [id number] malignancy grade 1 with negative LVSI.  The patient did have some concerns for a positive margin at the time of her lumpectomy and went reresection.  At the time of her reresection she had no evidence of residual disease but did have some fat necrosis.    The patient's MammaPrint was low risk indicating that she would benefit very little from adjuvant chemotherapy as such Dr. Riley is deferring her chemotherapy and will initiate hormonal manipulation following completion of radiotherapy.      We discussed what to expect with a course of radiotherapy.  I would recommend APBI for her.  This would include a planning session in 5 treatments.  I would  recommend treatment delivery over a 2-week timeframe of every other day.  The patient would require a dose of 30 Gray in 5 fractions to the lumpectomy plus a margin around it.    Would recommend IGR T and daily IMRT  given the left breast location.    We discussed what she could anticipate from acute and chronic side effect profile from a course of radiotherapy.  We discussed the risk of breast size discrepancy but can develop over the years as well as risk of telangiectasias.  She has been very low risk for the development of edema with this lesion and APBI however it is theoretically possible.    I spent 30 minutes in the patient's case today.      RECOMMENDATIONS:      Left upper outer quadrant breast cancer  - ER/AR positive HER2 negative  - Grade 1  - cT1c [place] nodes negative  -Low risk MammaPrint  -Initially had some concern for positive margins  -Reresection on 5/12/2025 showed no evidence of residual carcinoma  - Resected with Dr. Tiesha Gunter in [place]  - Follows with Dr. Riley with medical oncology  - Recommend APBI to the left breast 30 Gray 5 fractions to the resection cavity plus margin  - Recommend treatments every other day  - Recommend radiation planning today.  - Patient will undergo radiation planning is not related to the time spent evaluating her case.      Follow Up:   No follow-ups on file.  There are no diagnoses linked to this encounter.     Ezra Jane MD

## 2025-06-16 NOTE — PROGRESS NOTES
DATE OF VISIT: 2025    REASON FOR VISIT: Followup for left breast cancer     PROBLEM LIST:  1. Left upper outer quadrant invasive ductal carcinoma, T1 cN0 M0 stage Ia:  A.  Presented with abnormal screening mammogram 2025  B.  Diagnosed after ultrasound-guided biopsy 2025.  C.  Treated with lumpectomy with sentinel lymph node biopsy done by Dr. Gunter 2025 with second lumpectomy May 12, 2025 for positive margin.  D.  Final pathology revealed invasive ductal carcinoma, 1.2 cm, clear surgical margins with second lumpectomy, 2 negative sentinel lymph nodes, ER/SD 95% HER2/carlo -1+.  E.  MammaPrint revealed low risk, luminal A subtype with 5 years distant metastatic free 98% with hormone treatment alone.  F.  Started anastrozole 1 mg daily 2025  2.  Hypothyroid  3.  Mild depression    HISTORY OF PRESENT ILLNESS: The patient is a very pleasant 64 y.o. female  with past medical history significant for left breast cancer diagnosed 2025. The  patient is here today for scheduled follow-up visit.    SUBJECTIVE: The patient is here today with her .  She is anxious about the MammaPrint result.  Denies any fever or chills or night sweats.    Past History:  Medical History: has a past medical history of Abdominal hernia, Anxiety, Arthritis, Breast cancer, Breast lump, Disease of thyroid gland, Exposure to TB, Hypertension, Hypothyroidism, Invasive ductal carcinoma of breast, female, left, Irreducible incisional hernia (2016), PONV (postoperative nausea and vomiting), Thoracic outlet syndrome, and Vitamin D deficiency (2023).   Surgical History: has a past surgical history that includes Appendectomy;  section; Hernia repair; Hernia repair (); Total knee arthroplasty (Right); Breast biopsy (Left); Knee arthroscopy w/ meniscal repair (Right); breast lumpectomy with sentinel node biopsy (Left, 2025); Umbilical hernia repair (); Lymph node biopsy  (25); Joint replacement (10-24-23); and Breast lumpectomy (Left, 2025).   Family History: family history includes Breast cancer (age of onset: 54) in her maternal aunt; Breast cancer (age of onset: 57) in her mother; Cancer in her father and mother; Diabetes in her maternal grandfather, paternal grandmother, and another family member; Heart attack in an other family member; Lymphoma in her mother; Multiple myeloma in her father; Stroke in an other family member.   Social History: reports that she has never smoked. She has never used smokeless tobacco. She reports that she does not drink alcohol and does not use drugs.    (Not in a hospital admission)     Allergies: Codeine and Sulfa antibiotics     Review of Systems    PHYSICAL EXAMINATION:   There were no vitals taken for this visit.   There were no vitals filed for this visit.                  ECOG Performance Status: 1 - Symptomatic but completely ambulatory      General Appearance:      alert, cooperative, no apparent distress and appears stated age   Lungs:   Clear to auscultation bilaterally; respirations regular, even, and unlabored bilaterally   Heart:  Regular rate and rhythm, no murmurs appreciated   Abdomen:   Soft, non-tender, non-distended, and no organomegaly                 No visits with results within 2 Week(s) from this visit.   Latest known visit with results is:   Admission on 2025, Discharged on 2025   Component Date Value Ref Range Status   • Reference Lab Report 2025    Final                    Value:[facility]  [address] [place] [address]  Phone: [phone] or [phone]  Fax: [phone]  Milton Fair M.D., Medical Director    PATIENT NAME                           LABORATORY NO.  [id number]  [address]A [address]                  [id number]  [id number]                         AGE              SEX  SSN           CLIENT REF #  [place] [id number]       1960      [id number]   [id number]    [address] [place] [address] BY-PASS                REQUESTING M.SEAN.     ATTENDING M.D.     COPY TO.  [address]                        [address] [place], [place] [address]                 DATE COLLECTED      DATE RECEIVED      DATE REPORTED  05/12/2025          05/13/2025         05/15/2025    DIAGNOSIS:  A.   BREAST MARGIN, LEFT:  Fat necrosis, fibrosis, no residual carcinoma identified  B.   BREAST MARGIN, ADDITIONAL SUPERIOR MEDIAL MARGIN:  Fat necrosis, fibrosis, no residual carcinoma identified    EJT/sdl    CLINICAL                           HISTORY:  Invasive ductal carcinoma of breast, female, left    SPECIMENS RECEIVED:  A.  BREAST MARGIN, LEFT  B.  BREAST MARGIN, ADDITIONAL SUPERIOR MEDIAL MARGIN    MICROSCOPIC DESCRIPTION:  A.  Sections confirm primarily adipose tissue with a variable thick rind of  breast tissue demonstrating entrapped and reactive appearing ducts and  lobules.  Immunostains of one collection of these (A11) reveal P63/SMH  positive myoepithelium surrounding the ducts and lobules, confirming the  diagnosis.  No residual carcinoma is identified.  B.  Sections confirm adipose tissue with a variable thick rind of benign,  reactive appearing breast tissue.  There is extensive fat necrosis with  fibrosis and hemorrhage, but no residual carcinoma is observed.    The internal and external (both positive and negative) controls reacted  appropriately. Some of our immunohistochemical and in situ hybridization  studies are performed as analyte specific reagents. The following statement  applies to those tests:                           This test was developed, and its performance  characteristics determined by [facility]. It has not been  cleared or approved by the [place] Food and Drug Administration. However, the  FDA has determined that approval and clearance are not necessary.    Professional interpretation rendered by Andre Espinosa,  "TATIANA Kellogg, F.C.A.P. at  [employer], [facility], [address] [place] [address], [place] [address].    GROSS DESCRIPTION:  A.  Received in formalin labeled \"superior margin short stitch superior, long  stitch lateral\" and consists of an oriented portion of fibroadipose tissue  with a short stitch designating superior and a long stitch designating lateral  weighing 12 g and measuring 7.5 cm medial to lateral, 4.5 cm superior to  inferior, and 0.9 cm superficial to deep.  The superficial aspect has a  glistening and nodular surface with multiple embedded surgical staples  present, consistent with the previous resection side (old margin). The  specimen is inked as follows: Superior-red,                           inferior-blue, lateral-orange,  medial-yellow, superficial-green and deep-black.  The specimen is serially  sectioned from lateral to medial revealing 85% tan-yellow, lobulated  adipose tissue and 15% tan-white, stringy fibrous tissue.  No lesions or  biopsy clips are grossly identified.  The specimen is entirely submitted  sequentially from lateral to medial in cassettes A1-A18.  Cold ischemic  time: 13 minutes; total time in formalin: Between 6 to 72 hours.    B.  Received in formalin labeled \"additional superior medial margin short  stitch superior long lateral\" consists of an oriented portion of fibroadipose  tissue with a short stitch designating superior and a long stitch designating  lateral weighing 2 g and measuring 4.7 cm medial to lateral, 2.0 cm  superior to inferior, and 1.0 cm superficial to deep.  The superficial aspect  has a glistening and nodular surface, consistent with the previous resection  side (old margin).  The specimen is inked as follows:                           Superior-red,  inferior-blue, lateral-orange, medial-yellow, superficial-green and deep-  black.  The specimen is serially sectioned from lateral to medial revealing  85% tan-yellow, lobulated adipose tissue and 15% tan-white, stringy  fibrous " tissue.  No discrete masses or biopsy clips are grossly identified.  The specimen is submitted entirely and sequentially from lateral to medial  in cassettes B1-B5.  Cold ischemia time: 8 minutes; total time in formalin:  Between 6 to 72 hours.  MJM    REVIEWED, DIAGNOSED AND ELECTRONICALLY  SIGNED BY:    Andre Espinosa Jr., M.D., F.C.A.P.  CPT CODES:  88307x2, 26465, 76321          No results found.      ASSESSMENT: The patient is a very pleasant 64 y.o. female  with left breast cancer      PLAN:    1. Left upper outer quadrant invasive ductal carcinoma, T1 cN0 M0 stage Ia, MammaPrint low risk:  A.  I will start the patient on anastrozole 1 mg daily.  The patient will complete 5 years of treatment June 16, 2030.  B. We reviewed the potential side effects of anastrozole including hot flashes, vaginal dryness, joint pain, decrease in bone density, and mood or sleep disturbance.  C.  She will proceed with adjuvant radiation with Dr. Jane.  D.  She will need to have follow-up mammogram which we will start 6 months after completion of radiation.    2.  Bone health:  A.  I will start the patient on calcium and vitamin D daily.  B.  I will arrange for baseline bone density.    3.  Hypothyroid:  A.  She will continue Synthroid 75 mcg daily.    4.  Mild depression:  A.  She will continue Prozac 10 mg daily.    FOLLOW UP: 6 weeks to evaluate for treatment tolerance.    Faye Riley MD  6/16/2025

## 2025-06-23 PROCEDURE — 77300 RADIATION THERAPY DOSE PLAN: CPT | Performed by: RADIOLOGY

## 2025-06-23 PROCEDURE — 77338 DESIGN MLC DEVICE FOR IMRT: CPT | Performed by: RADIOLOGY

## 2025-06-23 PROCEDURE — 77301 RADIOTHERAPY DOSE PLAN IMRT: CPT | Performed by: RADIOLOGY

## 2025-06-30 ENCOUNTER — HOSPITAL ENCOUNTER (OUTPATIENT)
Facility: HOSPITAL | Age: 65
Discharge: HOME | End: 2025-06-30
Payer: MEDICARE

## 2025-06-30 LAB
RAD ONC ARIA COURSE ID: NORMAL
RAD ONC ARIA COURSE LAST TREATMENT DATE: NORMAL
RAD ONC ARIA COURSE START DATE: NORMAL
RAD ONC ARIA COURSE TREATMENT ELAPSED DAYS: 0
RAD ONC ARIA FIRST TREATMENT DATE: NORMAL
RAD ONC ARIA PLAN FRACTIONS TREATED TO DATE: 1
RAD ONC ARIA PLAN ID: NORMAL
RAD ONC ARIA PLAN NAME: NORMAL
RAD ONC ARIA PLAN PRESCRIBED DOSE PER FRACTION: 6 GY
RAD ONC ARIA PLAN PRIMARY REFERENCE POINT: NORMAL
RAD ONC ARIA PLAN TOTAL FRACTIONS PRESCRIBED: 5
RAD ONC ARIA PLAN TOTAL PRESCRIBED DOSE: 3000 CGY
RAD ONC ARIA REFERENCE POINT DOSAGE GIVEN TO DATE: 5.2 GY
RAD ONC ARIA REFERENCE POINT DOSAGE GIVEN TO DATE: 6 GY
RAD ONC ARIA REFERENCE POINT ID: NORMAL
RAD ONC ARIA REFERENCE POINT ID: NORMAL
RAD ONC ARIA REFERENCE POINT SESSION DOSAGE GIVEN: 5.2 GY
RAD ONC ARIA REFERENCE POINT SESSION DOSAGE GIVEN: 6 GY

## 2025-06-30 PROCEDURE — 77385: CPT | Performed by: RADIOLOGY

## 2025-07-03 ENCOUNTER — HOSPITAL ENCOUNTER (OUTPATIENT)
Facility: HOSPITAL | Age: 65
Setting detail: RADIATION/ONCOLOGY SERIES
End: 2025-07-03
Payer: MEDICARE

## 2025-07-03 LAB
RAD ONC ARIA COURSE ID: NORMAL
RAD ONC ARIA COURSE LAST TREATMENT DATE: NORMAL
RAD ONC ARIA COURSE START DATE: NORMAL
RAD ONC ARIA COURSE TREATMENT ELAPSED DAYS: 3
RAD ONC ARIA FIRST TREATMENT DATE: NORMAL
RAD ONC ARIA PLAN FRACTIONS TREATED TO DATE: 2
RAD ONC ARIA PLAN ID: NORMAL
RAD ONC ARIA PLAN NAME: NORMAL
RAD ONC ARIA PLAN PRESCRIBED DOSE PER FRACTION: 6 GY
RAD ONC ARIA PLAN PRIMARY REFERENCE POINT: NORMAL
RAD ONC ARIA PLAN TOTAL FRACTIONS PRESCRIBED: 5
RAD ONC ARIA PLAN TOTAL PRESCRIBED DOSE: 3000 CGY
RAD ONC ARIA REFERENCE POINT DOSAGE GIVEN TO DATE: 10.4 GY
RAD ONC ARIA REFERENCE POINT DOSAGE GIVEN TO DATE: 12 GY
RAD ONC ARIA REFERENCE POINT ID: NORMAL
RAD ONC ARIA REFERENCE POINT ID: NORMAL
RAD ONC ARIA REFERENCE POINT SESSION DOSAGE GIVEN: 5.2 GY
RAD ONC ARIA REFERENCE POINT SESSION DOSAGE GIVEN: 6 GY

## 2025-07-03 PROCEDURE — 77385: CPT | Performed by: RADIOLOGY

## 2025-07-07 ENCOUNTER — HOSPITAL ENCOUNTER (OUTPATIENT)
Facility: HOSPITAL | Age: 65
Setting detail: RADIATION/ONCOLOGY SERIES
Discharge: HOME | End: 2025-07-07
Payer: MEDICARE

## 2025-07-07 ENCOUNTER — HOSPITAL ENCOUNTER (OUTPATIENT)
Facility: HOSPITAL | Age: 65
Discharge: HOME | End: 2025-07-07
Payer: MEDICARE

## 2025-07-07 VITALS — BODY MASS INDEX: 38.01 KG/M2 | WEIGHT: 228.4 LBS

## 2025-07-07 LAB
RAD ONC ARIA COURSE ID: NORMAL
RAD ONC ARIA COURSE LAST TREATMENT DATE: NORMAL
RAD ONC ARIA COURSE START DATE: NORMAL
RAD ONC ARIA COURSE TREATMENT ELAPSED DAYS: 7
RAD ONC ARIA FIRST TREATMENT DATE: NORMAL
RAD ONC ARIA PLAN FRACTIONS TREATED TO DATE: 3
RAD ONC ARIA PLAN ID: NORMAL
RAD ONC ARIA PLAN NAME: NORMAL
RAD ONC ARIA PLAN PRESCRIBED DOSE PER FRACTION: 6 GY
RAD ONC ARIA PLAN PRIMARY REFERENCE POINT: NORMAL
RAD ONC ARIA PLAN TOTAL FRACTIONS PRESCRIBED: 5
RAD ONC ARIA PLAN TOTAL PRESCRIBED DOSE: 3000 CGY
RAD ONC ARIA REFERENCE POINT DOSAGE GIVEN TO DATE: 15.6 GY
RAD ONC ARIA REFERENCE POINT DOSAGE GIVEN TO DATE: 18 GY
RAD ONC ARIA REFERENCE POINT ID: NORMAL
RAD ONC ARIA REFERENCE POINT ID: NORMAL
RAD ONC ARIA REFERENCE POINT SESSION DOSAGE GIVEN: 5.2 GY
RAD ONC ARIA REFERENCE POINT SESSION DOSAGE GIVEN: 6 GY

## 2025-07-07 PROCEDURE — 77385: CPT | Performed by: RADIOLOGY

## 2025-07-09 ENCOUNTER — HOSPITAL ENCOUNTER (OUTPATIENT)
Facility: HOSPITAL | Age: 65
Discharge: HOME | End: 2025-07-09

## 2025-07-09 ENCOUNTER — TELEPHONE (OUTPATIENT)
Dept: ENDOCRINOLOGY | Facility: CLINIC | Age: 65
End: 2025-07-09
Payer: MEDICARE

## 2025-07-09 ENCOUNTER — LAB (OUTPATIENT)
Dept: LAB | Facility: HOSPITAL | Age: 65
End: 2025-07-09
Payer: MEDICARE

## 2025-07-09 DIAGNOSIS — E03.9 ACQUIRED HYPOTHYROIDISM: Primary | ICD-10-CM

## 2025-07-09 DIAGNOSIS — E03.9 ACQUIRED HYPOTHYROIDISM: ICD-10-CM

## 2025-07-09 LAB
RAD ONC ARIA COURSE ID: NORMAL
RAD ONC ARIA COURSE LAST TREATMENT DATE: NORMAL
RAD ONC ARIA COURSE START DATE: NORMAL
RAD ONC ARIA COURSE TREATMENT ELAPSED DAYS: 9
RAD ONC ARIA FIRST TREATMENT DATE: NORMAL
RAD ONC ARIA PLAN FRACTIONS TREATED TO DATE: 4
RAD ONC ARIA PLAN ID: NORMAL
RAD ONC ARIA PLAN NAME: NORMAL
RAD ONC ARIA PLAN PRESCRIBED DOSE PER FRACTION: 6 GY
RAD ONC ARIA PLAN PRIMARY REFERENCE POINT: NORMAL
RAD ONC ARIA PLAN TOTAL FRACTIONS PRESCRIBED: 5
RAD ONC ARIA PLAN TOTAL PRESCRIBED DOSE: 3000 CGY
RAD ONC ARIA REFERENCE POINT DOSAGE GIVEN TO DATE: 20.8 GY
RAD ONC ARIA REFERENCE POINT DOSAGE GIVEN TO DATE: 24 GY
RAD ONC ARIA REFERENCE POINT ID: NORMAL
RAD ONC ARIA REFERENCE POINT ID: NORMAL
RAD ONC ARIA REFERENCE POINT SESSION DOSAGE GIVEN: 5.2 GY
RAD ONC ARIA REFERENCE POINT SESSION DOSAGE GIVEN: 6 GY

## 2025-07-09 PROCEDURE — 36415 COLL VENOUS BLD VENIPUNCTURE: CPT

## 2025-07-09 PROCEDURE — 77385: CPT | Performed by: RADIOLOGY

## 2025-07-09 PROCEDURE — 84439 ASSAY OF FREE THYROXINE: CPT

## 2025-07-09 PROCEDURE — 84443 ASSAY THYROID STIM HORMONE: CPT

## 2025-07-09 NOTE — TELEPHONE ENCOUNTER
Patient called complained of severe fatigue and weight gain, she is worried that it is due to her thyroid levels being off. She requested that orders be put in to check her levels. Advised Dr. Morelos is out of the office today but would be back tomorrow. She voiced understanding and requested a call to let her know when they are entered.

## 2025-07-09 NOTE — TELEPHONE ENCOUNTER
Orders sent- she can go to any [facility] diagnostic center and have these done  Thanks, nghia

## 2025-07-10 ENCOUNTER — RESULTS FOLLOW-UP (OUTPATIENT)
Dept: ENDOCRINOLOGY | Facility: CLINIC | Age: 65
End: 2025-07-10
Payer: MEDICARE

## 2025-07-10 LAB
T4 FREE SERPL-MCNC: 1.19 NG/DL (ref 0.92–1.68)
TSH SERPL DL<=0.05 MIU/L-ACNC: 0.52 UIU/ML (ref 0.27–4.2)

## 2025-07-10 NOTE — LETTER
Ruthann Morales  [address] [place] [address] [place] [address]    July 10, 2025     Dear Ms. Morales:    Below are the results from your recent visit:    Resulted Orders   TSH+Free T4   Result Value Ref Range    TSH 0.520 0.270 - 4.200 uIU/mL    Free T4 1.19 0.92 - 1.68 ng/dL     Thyroid tests are normal - no changes recommended.      If you have any questions or concerns, please don't hesitate to call.         Sincerely,        Kia Morelos MD

## 2025-07-11 ENCOUNTER — HOSPITAL ENCOUNTER (OUTPATIENT)
Facility: HOSPITAL | Age: 65
Discharge: HOME | End: 2025-07-11

## 2025-07-11 LAB
RAD ONC ARIA COURSE ID: NORMAL
RAD ONC ARIA COURSE LAST TREATMENT DATE: NORMAL
RAD ONC ARIA COURSE START DATE: NORMAL
RAD ONC ARIA COURSE TREATMENT ELAPSED DAYS: 11
RAD ONC ARIA FIRST TREATMENT DATE: NORMAL
RAD ONC ARIA PLAN FRACTIONS TREATED TO DATE: 5
RAD ONC ARIA PLAN ID: NORMAL
RAD ONC ARIA PLAN NAME: NORMAL
RAD ONC ARIA PLAN PRESCRIBED DOSE PER FRACTION: 6 GY
RAD ONC ARIA PLAN PRIMARY REFERENCE POINT: NORMAL
RAD ONC ARIA PLAN TOTAL FRACTIONS PRESCRIBED: 5
RAD ONC ARIA PLAN TOTAL PRESCRIBED DOSE: 3000 CGY
RAD ONC ARIA REFERENCE POINT DOSAGE GIVEN TO DATE: 26 GY
RAD ONC ARIA REFERENCE POINT DOSAGE GIVEN TO DATE: 30 GY
RAD ONC ARIA REFERENCE POINT ID: NORMAL
RAD ONC ARIA REFERENCE POINT ID: NORMAL
RAD ONC ARIA REFERENCE POINT SESSION DOSAGE GIVEN: 5.2 GY
RAD ONC ARIA REFERENCE POINT SESSION DOSAGE GIVEN: 6 GY

## 2025-07-11 PROCEDURE — 77385: CPT | Performed by: RADIOLOGY

## 2025-07-11 PROCEDURE — 77336 RADIATION PHYSICS CONSULT: CPT | Performed by: RADIOLOGY

## 2025-08-04 ENCOUNTER — OFFICE VISIT (OUTPATIENT)
Age: 65
End: 2025-08-04
Payer: MEDICARE

## 2025-08-04 VITALS
DIASTOLIC BLOOD PRESSURE: 72 MMHG | HEART RATE: 80 BPM | HEIGHT: 65 IN | WEIGHT: 228 LBS | RESPIRATION RATE: 12 BRPM | TEMPERATURE: 96.9 F | SYSTOLIC BLOOD PRESSURE: 180 MMHG | OXYGEN SATURATION: 98 % | BODY MASS INDEX: 37.99 KG/M2

## 2025-08-04 DIAGNOSIS — C50.812 MALIGNANT NEOPLASM OF OVERLAPPING SITES OF LEFT BREAST IN FEMALE, ESTROGEN RECEPTOR POSITIVE: ICD-10-CM

## 2025-08-04 DIAGNOSIS — Z78.0 MENOPAUSE: ICD-10-CM

## 2025-08-04 DIAGNOSIS — Z13.820 SCREENING FOR OSTEOPOROSIS: ICD-10-CM

## 2025-08-04 DIAGNOSIS — Z17.0 MALIGNANT NEOPLASM OF OVERLAPPING SITES OF LEFT BREAST IN FEMALE, ESTROGEN RECEPTOR POSITIVE: ICD-10-CM

## 2025-08-04 DIAGNOSIS — C50.912 INVASIVE DUCTAL CARCINOMA OF BREAST, FEMALE, LEFT: Primary | ICD-10-CM

## 2025-08-04 PROCEDURE — 99214 OFFICE O/P EST MOD 30 MIN: CPT | Performed by: NURSE PRACTITIONER

## 2025-08-04 PROCEDURE — 1126F AMNT PAIN NOTED NONE PRSNT: CPT | Performed by: NURSE PRACTITIONER

## 2025-08-04 PROCEDURE — 3078F DIAST BP <80 MM HG: CPT | Performed by: NURSE PRACTITIONER

## 2025-08-04 PROCEDURE — 3077F SYST BP >= 140 MM HG: CPT | Performed by: NURSE PRACTITIONER

## 2025-08-04 PROCEDURE — 1160F RVW MEDS BY RX/DR IN RCRD: CPT | Performed by: NURSE PRACTITIONER

## 2025-08-04 PROCEDURE — 1159F MED LIST DOCD IN RCRD: CPT | Performed by: NURSE PRACTITIONER

## 2025-08-21 ENCOUNTER — CLINICAL SUPPORT (OUTPATIENT)
Dept: RADIATION ONCOLOGY | Facility: HOSPITAL | Age: 65
End: 2025-08-21
Payer: MEDICARE

## 2025-08-21 DIAGNOSIS — C50.912 INVASIVE DUCTAL CARCINOMA OF BREAST, FEMALE, LEFT: Primary | ICD-10-CM

## 2025-08-22 LAB
RAD ONC ARIA COURSE END DATE: NORMAL
RAD ONC ARIA COURSE ID: NORMAL
RAD ONC ARIA COURSE LAST TREATMENT DATE: NORMAL
RAD ONC ARIA COURSE START DATE: NORMAL
RAD ONC ARIA COURSE TREATMENT ELAPSED DAYS: 11
RAD ONC ARIA FIRST TREATMENT DATE: NORMAL
RAD ONC ARIA PLAN FRACTIONS TREATED TO DATE: 5
RAD ONC ARIA PLAN ID: NORMAL
RAD ONC ARIA PLAN NAME: NORMAL
RAD ONC ARIA PLAN PRESCRIBED DOSE PER FRACTION: 6 GY
RAD ONC ARIA PLAN PRIMARY REFERENCE POINT: NORMAL
RAD ONC ARIA PLAN TOTAL FRACTIONS PRESCRIBED: 5
RAD ONC ARIA PLAN TOTAL PRESCRIBED DOSE: 3000 CGY
RAD ONC ARIA REFERENCE POINT DOSAGE GIVEN TO DATE: 26 GY
RAD ONC ARIA REFERENCE POINT DOSAGE GIVEN TO DATE: 30 GY
RAD ONC ARIA REFERENCE POINT ID: NORMAL
RAD ONC ARIA REFERENCE POINT ID: NORMAL

## (undated) DEVICE — FLEXIBLE YANKAUER,MEDIUM TIP, NO VACUUM CONTROL: Brand: ARGYLE

## (undated) DEVICE — SUT SILK 3/0 SH 30IN K832H

## (undated) DEVICE — ANTIBACTERIAL VIOLET BRAIDED (POLYGLACTIN 910), SYNTHETIC ABSORBABLE SUTURE: Brand: COATED VICRYL

## (undated) DEVICE — NDL HYPO ECLPS SFTY 25G 1 1/2IN

## (undated) DEVICE — SUT VIC 3/0 SH 27IN J416H

## (undated) DEVICE — ADHS LIQ MASTISOL 2/3ML

## (undated) DEVICE — DRSNG WND GZ PAD BORDERED LF 4X5IN STRL

## (undated) DEVICE — DRSNG WND BORDR/ADHS NONADHR/GZ LF 6X6IN STRL

## (undated) DEVICE — CVR TRANSD CIV FLX TPR 11.9 TO 3.8X61CM

## (undated) DEVICE — SUT SILK 2/0 SH 30IN K833H

## (undated) DEVICE — RICH MAJOR PROCEDURE: Brand: MEDLINE INDUSTRIES, INC.

## (undated) DEVICE — PATIENT RETURN ELECTRODE, SINGLE-USE, CONTACT QUALITY MONITORING, ADULT, WITH 9FT CORD, FOR PATIENTS WEIGING OVER 33LBS. (15KG): Brand: MEGADYNE

## (undated) DEVICE — SOL IRR H2O BO 1000ML STRL

## (undated) DEVICE — STRIP,CLOSURE,WOUND,MEDI-STRIP,1/2X4: Brand: MEDLINE

## (undated) DEVICE — GLV SURG ULTRATOUCH BIOGEL/COAT PF LF SZ6 STRL

## (undated) DEVICE — GAUZE,SPONGE,4"X4",12PLY,STERILE,LF,2'S: Brand: MEDLINE

## (undated) DEVICE — UNDYED MONOFILAMENT (POLYDIOXANONE), ABSORBABLE SYNTHETIC SURGICAL SUTURE: Brand: PDS

## (undated) DEVICE — SYR LL TP 10ML STRL

## (undated) DEVICE — ANTIBACTERIAL UNDYED BRAIDED (POLYGLACTIN 910), SYNTHETIC ABSORBABLE SUTURE: Brand: COATED VICRYL

## (undated) DEVICE — SLV SCD CALF HEMOFORCE DVT THERP REPROC MD

## (undated) DEVICE — GLV SURG SENSICARE GREEN W/ALOE PF LF 6 STRL

## (undated) DEVICE — DRSNG WND BORDR/ADHS NONADHR/GZ LF 4X4IN STRL

## (undated) DEVICE — PENCL ES MEGADINE EZ/CLEAN BUTN W/HOLSTR 10FT

## (undated) DEVICE — UNDYED BRAIDED (POLYGLACTIN 910), SYNTHETIC ABSORBABLE SUTURE: Brand: COATED VICRYL